# Patient Record
Sex: FEMALE | Race: WHITE | NOT HISPANIC OR LATINO | Employment: OTHER | URBAN - METROPOLITAN AREA
[De-identification: names, ages, dates, MRNs, and addresses within clinical notes are randomized per-mention and may not be internally consistent; named-entity substitution may affect disease eponyms.]

---

## 2023-06-20 LAB
ANION GAP IN SER/PLAS: 14 MMOL/L (ref 10–20)
CALCIUM (MG/DL) IN SER/PLAS: 8.7 MG/DL (ref 8.6–10.3)
CARBON DIOXIDE, TOTAL (MMOL/L) IN SER/PLAS: 25 MMOL/L (ref 21–32)
CHLORIDE (MMOL/L) IN SER/PLAS: 104 MMOL/L (ref 98–107)
CREATININE (MG/DL) IN SER/PLAS: 1.04 MG/DL (ref 0.5–1.05)
GFR FEMALE: 64 ML/MIN/1.73M2
GLUCOSE (MG/DL) IN SER/PLAS: 107 MG/DL (ref 74–99)
POTASSIUM (MMOL/L) IN SER/PLAS: 3.9 MMOL/L (ref 3.5–5.3)
SODIUM (MMOL/L) IN SER/PLAS: 139 MMOL/L (ref 136–145)
UREA NITROGEN (MG/DL) IN SER/PLAS: 19 MG/DL (ref 6–23)

## 2023-10-12 DIAGNOSIS — Z90.11 ACQUIRED ABSENCE OF RIGHT BREAST AND NIPPLE: Primary | ICD-10-CM

## 2023-10-16 PROBLEM — Z90.11 ACQUIRED ABSENCE OF RIGHT BREAST AND NIPPLE: Status: ACTIVE | Noted: 2023-10-12

## 2023-10-17 NOTE — PROGRESS NOTES
Surgery Date is set for 23        Chief Complaint    Acquired absent of breast and NAC  To discuss breast reconstruction with autologous tissue.      History of Present IllnessJulie is s/p Right MASTECTOMY with inferior Manzanares pattern design, and immediate PREPEC TE (SIZE 480-575cc) reconstruction with ADM and adipofascial flap coverage with REED VAC on 3/19/2021.   She returns today to discuss second stage surgery.     Medical Hx: meningioma with observation, and melanoma 27 years ago. HTN  Surgical Hx:  x2, hysterectomy for heavy menses        Review of Systems    Constitutional: no fever and no chills.   Eyes: no loss of vision and no discharge from the eyes.   ENT: no hearing loss, no neck pain and no sore throat.   Neck: no mass(es).   Breast: no nipple discharge, no breast mass, no skin symptoms, no pain in breast, no erythema and no axillary adenopathy.   Cardiovascular: no chest pain, no palpitations and no chest pressure.   Respiratory: no dyspnea at rest, no dyspnea during exertion and no asthma.   Gastrointestinal: no abdominal pain, no constipation, no vomiting, no nausea, no diarrhea and no dysphagia.   Genitourinary: no dysuria, no hematuria and no incontinence.   Musculoskeletal: no arthralgias, no myalgias, no hernia, no back pain and no neck pain.   Skin: no rashes and no skin lesions.   Neurological: no headache, no dizziness and no numbness.   Psychiatric: no anxiety, no depression, no emotional problems and no sleep disturbances.   Endocrine: no heat or cold intolerance and no increased thirst.   Hematologic/Lymphatic: no swollen glands, no tendency for easy bleeding, no tendency for easy bruising and no anemia.     Physical Exam:    Normal heart rate and rhythm, and normal Pulses. Normal respiratory rate, with no wheezing or cough. Normal sensory and motor exam. No extremity edema. Soft, non tender abdomen with no hernias. No lymphadenopathy or swollen glands. Deep tendon reflexes  WNL. Sensory exam is WNL. Normal ROM for her extremities.   Right breast: Half in size of the left breast, constriction of the lower pole was noted. No skin changes were appreciated. Breast is non tender. 0.3 cm mole with clear margins at the inferior pole of right breast is noted (without color changes, overgrowth.   Left breast grade III ptosis. Negative for masses, skin changes, or inverted nipple.     Assessment and plan:    I met with Lorraine and her  in my clinic today to review our planned surgery and address some of their questions related to right breast reconstruction surgery.    CTA was reviewed again: Sizeable, Y-configuration medial row  from left IDEA in addition to medium size lateral row perforators, and medium size medial row . EMI flap based on left side perforators is feasible.    We also discussed balancing procedure: Immediate vs. Delayed/second stage balancing procedures were discussed. Patient would like her right breast augmented and left breast reduced in size, and agreeable to perform revisions at a later stage. Patient is still undetermined about the ideal shape of her right breast (projected vs. Ptotic), and this will need to be revisited before surgery.    Patient has history of melanoma, and she requested that the mole on her right breast (cental inferior pole) be resected and send to pathology, per her Dermatologist.     We concisely reviewed possible risks including infection, hematoma and seroma at the recipient or donor site, flap related complications such as emergency exploration and partial or total failure, and need for a second free flap/pedicle flap, wound healing issues, sensory disturbances at the chest or abdomen, need for additional surgeries, medical complications from GA and/or surgery including but not limited to DVT/PE and up to mortality. Need for blood transfusion and blood thinners and their potential complications were covered today.  Finally, perioperative course and recovery and rehab were addressed thoroughly.     Patient and  demonstrated good understanding to our discussion today, and her questions were answered to the best of my knowledge. And she wanted us to proceed.      Surgery is planned on Dec 13.

## 2023-10-20 PROBLEM — Z17.1 MALIGNANT NEOPLASM OF UPPER-OUTER QUADRANT OF RIGHT BREAST IN FEMALE, ESTROGEN RECEPTOR NEGATIVE (MULTI): Status: ACTIVE | Noted: 2023-10-20

## 2023-10-20 PROBLEM — R92.30 DENSE BREAST TISSUE: Status: ACTIVE | Noted: 2023-10-20

## 2023-10-20 PROBLEM — D05.11 DUCTAL CARCINOMA IN SITU (DCIS) OF RIGHT BREAST WITH COMEDONECROSIS: Status: ACTIVE | Noted: 2023-10-20

## 2023-10-20 PROBLEM — C43.9 MALIGNANT MELANOMA (MULTI): Status: ACTIVE | Noted: 2023-10-20

## 2023-10-20 PROBLEM — N39.3 STRESS INCONTINENCE OF URINE: Status: ACTIVE | Noted: 2023-10-20

## 2023-10-20 PROBLEM — C50.411 MALIGNANT NEOPLASM OF UPPER-OUTER QUADRANT OF RIGHT BREAST IN FEMALE, ESTROGEN RECEPTOR NEGATIVE (MULTI): Status: ACTIVE | Noted: 2023-10-20

## 2023-10-20 PROBLEM — R22.31 MASS OF RIGHT AXILLA: Status: ACTIVE | Noted: 2023-10-20

## 2023-10-20 PROBLEM — N60.99 ATYPICAL DUCTAL HYPERPLASIA OF BREAST: Status: ACTIVE | Noted: 2023-10-20

## 2023-10-20 PROBLEM — D32.9 MENINGIOMA (MULTI): Status: ACTIVE | Noted: 2023-10-20

## 2023-10-20 PROBLEM — I10 HYPERTENSION: Status: ACTIVE | Noted: 2023-10-20

## 2023-10-20 RX ORDER — LISINOPRIL 40 MG/1
40 TABLET ORAL DAILY
COMMUNITY
Start: 2022-02-15 | End: 2023-11-28 | Stop reason: ALTCHOICE

## 2023-10-20 RX ORDER — LISINOPRIL 20 MG/1
20 TABLET ORAL DAILY
COMMUNITY
End: 2023-11-28 | Stop reason: ALTCHOICE

## 2023-10-20 RX ORDER — LISINOPRIL AND HYDROCHLOROTHIAZIDE 12.5; 2 MG/1; MG/1
2 TABLET ORAL DAILY
COMMUNITY
End: 2023-11-03

## 2023-10-23 ENCOUNTER — OFFICE VISIT (OUTPATIENT)
Dept: PLASTIC SURGERY | Facility: CLINIC | Age: 53
End: 2023-10-23
Payer: COMMERCIAL

## 2023-10-23 VITALS
TEMPERATURE: 97.5 F | DIASTOLIC BLOOD PRESSURE: 87 MMHG | HEIGHT: 65 IN | BODY MASS INDEX: 33.82 KG/M2 | HEART RATE: 89 BPM | SYSTOLIC BLOOD PRESSURE: 126 MMHG | WEIGHT: 203 LBS

## 2023-10-23 DIAGNOSIS — Z71.89 ENCOUNTER TO DISCUSS BREAST RECONSTRUCTION: ICD-10-CM

## 2023-10-23 DIAGNOSIS — Z90.11 ACQUIRED ABSENCE OF BREAST AND ABSENT NIPPLE, RIGHT: Primary | ICD-10-CM

## 2023-10-23 PROCEDURE — 3074F SYST BP LT 130 MM HG: CPT

## 2023-10-23 PROCEDURE — 3079F DIAST BP 80-89 MM HG: CPT

## 2023-10-23 PROCEDURE — 99215 OFFICE O/P EST HI 40 MIN: CPT

## 2023-10-23 PROCEDURE — 1036F TOBACCO NON-USER: CPT

## 2023-10-23 ASSESSMENT — PAIN SCALES - GENERAL: PAINLEVEL: 0-NO PAIN

## 2023-11-02 DIAGNOSIS — I10 HYPERTENSION, UNSPECIFIED TYPE: ICD-10-CM

## 2023-11-03 RX ORDER — LISINOPRIL AND HYDROCHLOROTHIAZIDE 12.5; 2 MG/1; MG/1
2 TABLET ORAL DAILY
Qty: 60 TABLET | Refills: 0 | Status: SHIPPED | OUTPATIENT
Start: 2023-11-03 | End: 2023-12-28

## 2023-11-06 ENCOUNTER — PHARMACY VISIT (OUTPATIENT)
Dept: PHARMACY | Facility: CLINIC | Age: 53
End: 2023-11-06
Payer: COMMERCIAL

## 2023-11-06 PROCEDURE — RXMED WILLOW AMBULATORY MEDICATION CHARGE

## 2023-11-15 ENCOUNTER — TELEMEDICINE CLINICAL SUPPORT (OUTPATIENT)
Dept: PREADMISSION TESTING | Facility: HOSPITAL | Age: 53
End: 2023-11-15
Payer: COMMERCIAL

## 2023-11-28 ENCOUNTER — PRE-ADMISSION TESTING (OUTPATIENT)
Dept: PREADMISSION TESTING | Facility: HOSPITAL | Age: 53
End: 2023-11-28
Payer: COMMERCIAL

## 2023-11-28 ENCOUNTER — HOSPITAL ENCOUNTER (OUTPATIENT)
Dept: CARDIOLOGY | Facility: HOSPITAL | Age: 53
Discharge: HOME | End: 2023-11-28
Payer: COMMERCIAL

## 2023-11-28 VITALS
DIASTOLIC BLOOD PRESSURE: 82 MMHG | TEMPERATURE: 97.5 F | HEART RATE: 74 BPM | WEIGHT: 205.69 LBS | BODY MASS INDEX: 34.27 KG/M2 | SYSTOLIC BLOOD PRESSURE: 119 MMHG | OXYGEN SATURATION: 99 % | HEIGHT: 65 IN

## 2023-11-28 DIAGNOSIS — I10 HYPERTENSION, UNSPECIFIED TYPE: ICD-10-CM

## 2023-11-28 DIAGNOSIS — Z01.818 PREOP TESTING: ICD-10-CM

## 2023-11-28 DIAGNOSIS — Z01.818 PREOP TESTING: Primary | ICD-10-CM

## 2023-11-28 DIAGNOSIS — Z90.11 ACQUIRED ABSENCE OF RIGHT BREAST AND NIPPLE: ICD-10-CM

## 2023-11-28 LAB
ABO GROUP (TYPE) IN BLOOD: NORMAL
ANION GAP SERPL CALC-SCNC: 16 MMOL/L (ref 10–20)
ANTIBODY SCREEN: NORMAL
BUN SERPL-MCNC: 16 MG/DL (ref 6–23)
CALCIUM SERPL-MCNC: 9.6 MG/DL (ref 8.6–10.6)
CHLORIDE SERPL-SCNC: 99 MMOL/L (ref 98–107)
CO2 SERPL-SCNC: 26 MMOL/L (ref 21–32)
CREAT SERPL-MCNC: 0.99 MG/DL (ref 0.5–1.05)
ERYTHROCYTE [DISTWIDTH] IN BLOOD BY AUTOMATED COUNT: 12.5 % (ref 11.5–14.5)
GFR SERPL CREATININE-BSD FRML MDRD: 68 ML/MIN/1.73M*2
GLUCOSE SERPL-MCNC: 84 MG/DL (ref 74–99)
HCT VFR BLD AUTO: 38.3 % (ref 36–46)
HGB BLD-MCNC: 12.2 G/DL (ref 12–16)
MCH RBC QN AUTO: 29.9 PG (ref 26–34)
MCHC RBC AUTO-ENTMCNC: 31.9 G/DL (ref 32–36)
MCV RBC AUTO: 94 FL (ref 80–100)
NRBC BLD-RTO: 0 /100 WBCS (ref 0–0)
PLATELET # BLD AUTO: 272 X10*3/UL (ref 150–450)
POTASSIUM SERPL-SCNC: 3.9 MMOL/L (ref 3.5–5.3)
RBC # BLD AUTO: 4.08 X10*6/UL (ref 4–5.2)
RH FACTOR (ANTIGEN D): NORMAL
SODIUM SERPL-SCNC: 137 MMOL/L (ref 136–145)
WBC # BLD AUTO: 8.5 X10*3/UL (ref 4.4–11.3)

## 2023-11-28 PROCEDURE — 80048 BASIC METABOLIC PNL TOTAL CA: CPT

## 2023-11-28 PROCEDURE — 86901 BLOOD TYPING SEROLOGIC RH(D): CPT

## 2023-11-28 PROCEDURE — 93005 ELECTROCARDIOGRAM TRACING: CPT

## 2023-11-28 PROCEDURE — 85027 COMPLETE CBC AUTOMATED: CPT

## 2023-11-28 PROCEDURE — 99204 OFFICE O/P NEW MOD 45 MIN: CPT | Performed by: NURSE PRACTITIONER

## 2023-11-28 PROCEDURE — 93010 ELECTROCARDIOGRAM REPORT: CPT | Performed by: INTERNAL MEDICINE

## 2023-11-28 PROCEDURE — 87081 CULTURE SCREEN ONLY: CPT

## 2023-11-28 PROCEDURE — 36415 COLL VENOUS BLD VENIPUNCTURE: CPT

## 2023-11-28 RX ORDER — CHLORHEXIDINE GLUCONATE ORAL RINSE 1.2 MG/ML
15 SOLUTION DENTAL 2 TIMES DAILY
Qty: 473 ML | Refills: 0 | Status: SHIPPED | OUTPATIENT
Start: 2023-11-28 | End: 2023-12-18 | Stop reason: HOSPADM

## 2023-11-28 RX ORDER — CHLORHEXIDINE GLUCONATE 40 MG/ML
SOLUTION TOPICAL 2 TIMES DAILY
Qty: 473 ML | Refills: 0 | Status: SHIPPED | OUTPATIENT
Start: 2023-11-28 | End: 2023-12-03

## 2023-11-28 ASSESSMENT — CHADS2 SCORE
CHF: NO
PRIOR STROKE OR TIA OR THROMBOEMBOLISM: NO
DIABETES: NO
AGE GREATER THAN OR EQUAL TO 75: NO
AGE GREATER THAN OR EQUAL TO 75: NO
HYPERTENSION: YES
CHADS2 SCORE: 1

## 2023-11-28 ASSESSMENT — DUKE ACTIVITY SCORE INDEX (DASI)
CAN YOU PARTICIPATE IN MODERATE RECREATIONAL ACTIVITIES LIKE GOLF, BOWLING, DANCING, DOUBLES TENNIS OR THROWING A BASEBALL OR FOOTBALL: YES
CAN YOU WALK INDOORS, SUCH AS AROUND YOUR HOUSE: YES
CAN YOU WALK A BLOCK OR TWO ON LEVEL GROUND: YES
CAN YOU HAVE SEXUAL RELATIONS: YES
CAN YOU RUN A SHORT DISTANCE: YES
CAN YOU TAKE CARE OF YOURSELF (EAT, DRESS, BATHE, OR USE TOILET): YES
CAN YOU DO MODERATE WORK AROUND THE HOUSE LIKE VACUUMING, SWEEPING FLOORS OR CARRYING GROCERIES: YES
CAN YOU DO HEAVY WORK AROUND THE HOUSE LIKE SCRUBBING FLOORS OR LIFTING AND MOVING HEAVY FURNITURE: YES
CAN YOU DO LIGHT WORK AROUND THE HOUSE LIKE DUSTING OR WASHING DISHES: YES
CAN YOU PARTICIPATE IN STRENOUS SPORTS LIKE SWIMMING, SINGLES TENNIS, FOOTBALL, BASKETBALL, OR SKIING: YES
TOTAL_SCORE: 58.2
CAN YOU DO YARD WORK LIKE RAKING LEAVES, WEEDING OR PUSHING A MOWER: YES
DASI METS SCORE: 9.9
CAN YOU CLIMB A FLIGHT OF STAIRS OR WALK UP A HILL: YES

## 2023-11-28 ASSESSMENT — ENCOUNTER SYMPTOMS
CARDIOVASCULAR NEGATIVE: 1
FEVER: 0
ENDOCRINE NEGATIVE: 1
CONSTITUTIONAL NEGATIVE: 1
EYES NEGATIVE: 1
NEUROLOGICAL NEGATIVE: 1
RESPIRATORY NEGATIVE: 1
GASTROINTESTINAL NEGATIVE: 1
CHILLS: 0
MUSCULOSKELETAL NEGATIVE: 1
NECK NEGATIVE: 1

## 2023-11-28 ASSESSMENT — LIFESTYLE VARIABLES: SMOKING_STATUS: NONSMOKER

## 2023-11-28 NOTE — PREPROCEDURE INSTRUCTIONS
NPO Instructions:    Do not eat any food after midnight the night before your surgery/procedure.  You may have 10 ounces clear liquids until TWO hours before surgery/procedure. This includes water, black tea/coffee, (no milk or cream) apple juice and electrolyte drinks (Gatorade).  You may chew gum up to TWO hours before your surgery/procedure.    Additional Instructions:     Seven/Six Days before Surgery:  We have sent a prescription for Hibiclens soap to your preferred pharmacy.  If you have not already, Please  your prescription and start using five days before surgery.  Follow the instruction sheet provided to you at your CPM/PAT appointment.  Review your medication instructions, stop indicated medications    Five Days before Surgery:  Review your medication instructions, stop indicated medications  Begin using your Hibiclens    Three Days before Surgery:  Review your medication instructions, stop indicated medications    The Day before Surgery:  Review your medication instructions, stop indicated medications  You will be contacted regarding the time of your arrival to facility and surgery time  Do not eat any food after Midnight    Day of Surgery:  Review your medication instructions, take indicated medications  You may have clear liquids until TWO hours before surgery/procedure.  This includes water, black tea/coffee, (no milk or cream) apple juice and electrolyte drinks (Gatorade)  You may chew gum up to TWO hours before your surgery/procedure  Wear  comfortable loose fitting clothing  Do not use moisturizers, creams, lotions or perfume  All jewelry and valuables should be left at home    Avoid herbal supplements, multivitamins and NSAIDS (non-steroidal anti-inflammatory drugs) such as Advil, Aleve, Ibuprofen, Naproxen, Excedrin, Meloxicam or Celebrex for at least 7 days prior to surgery. May take Tylenol as needed.    Please follow up with Dr. Omar Mendoza, neurosurgery as soon as possible.      Ally Parnell, MSN, NP-C, CNP  Family Nurse Practitioner  Department of Anesthesiology and Perioperative Medicine  Main phone: [phone]  Fax :[phone]  Direct: [phone]

## 2023-11-28 NOTE — CPM/PAT H&P
CPM/PAT Evaluation       Name: Lorraine Liu (Lorraine Liu)  /Age: 1970/53 y.o.     Visit Type:   In-Person       Chief Complaint: Patient is a 53 year old female scheduled for Release Contracture Torso - Right  Removal Tissue Expander - Right Capsulotomy Breast - Right Reconstruction Breast with Deep Inferior Epigastric  Flap - Right with Dr. Leigh Watkins and Dr. Bay Albrecht on 23    HPI   Patient is a 53 year old female scheduled for Release Contracture Torso - Right  Removal Tissue Expander - Right Capsulotomy Breast - Right Reconstruction Breast with Deep Inferior Epigastric  Flap - Right with Dr. Leigh Watkins and Dr. 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 Patient referred by Dr. Watkins for preop evaluation of hypertension, breast cancer, melanoma and meningioma.    Past Medical History:   Diagnosis Date    Chest pain     Saw Dr. Mclaughlin in 2022    Ductal carcinoma of breast (CMS/HCC) 2021    Hypertension     F/W PCP    Melanoma (CMS/HCC)     Meningioma (CMS/HCC)     Shortness of breath     Ct Calcium Scoring 3/18/2022: IMPRESSION: Coronary artery calcium score of  0.    Stress incontinence        Past Surgical History:   Procedure Laterality Date    BI BREAST CYST ASPIRATION LEFT Left 2016    BI BREAST CYST ASPIRATION LEFT [facility] ANCILLARY LEGACY    BI MAMMO GUIDED LOCALIZATION BREAST RIGHT Right 02/10/2021    BI MAMMO GUIDED LOCALIZATION BREAST RIGHT 2/10/2021 [facility] CLINICAL LEGACY    BI STEREOSTATIC GUIDED BREAST RIGHT LOCALIZATION AND BIOPSY Right 2021    BI STEREOTACTIC GUIDED BREAST LOCALIZATION AND BIOPSY RIGHT [facility] CLINICAL LEGACY    BI US GUIDED BREAST LOCALIZATION AND BIOPSY RIGHT Right 2021    BI US GUIDED BREAST LOCALIZATION AND BIOPSY RIGHT [facility] CLINICAL LEGACY    BREAST LUMPECTOMY Right 02/10/2021     SECTION, LOW TRANSVERSE      x2    CT ABDOMEN PELVIS ANGIOGRAM W AND/OR WO IV CONTRAST  2023     CT ABDOMEN PELVIS ANGIOGRAM W AND/OR WO IV CONTRAST 6/22/2023 [id number] CT    HYSTERECTOMY      d/t heavy menses    MASTECTOMY Right     OTHER SURGICAL HISTORY  03/25/2021    Breast reconstruction    OTHER SURGICAL HISTORY  1996    Melanoma Excision       Family History   Problem Relation Name Age of Onset    Diabetes Mother      Melanoma Father      Thyroid cancer Father      Melanoma Brother      Leukemia Brother      Other (brain tumor) Brother         No Known Allergies    Prior to Admission medications    Medication Sig Start Date End Date Taking? Authorizing Provider   lisinopriL-hydrochlorothiazide 20-12.5 mg tablet Take 2 tablets by mouth once daily 11/3/23  Yes Lyle Herbert, DO   tamoxifen (Nolvadex) 20 mg tablet TAKE ONE (1) TABLET BY MOUTH ONCE A DAY 6/1/23 5/31/24 Yes Blayne Franco, APRN-CNP   lisinopril 20 mg tablet Take 1 tablet (20 mg) by mouth once daily.  11/28/23  Historical Provider, MD   lisinopril 40 mg tablet Take 1 tablet (40 mg) by mouth once daily. For 90 days 2/15/22 11/28/23  Historical Provider, MD        PAT ROS:   Constitutional:   neg     no fever   no chills  Neuro/Psych:   neg    Eyes:   neg    Ears:   neg    Nose:   neg    Mouth:   neg    Throat:   neg    Neck:   neg    Cardio:   neg    Respiratory:   neg    Endocrine:   neg    GI:   neg    :    Stress incontinence  Musculoskeletal:   neg    Hematologic:   neg    Skin:  neg        Physical Exam  Vitals reviewed.   Constitutional:       Appearance: Normal appearance.   HENT:      Head: Normocephalic and atraumatic.      Nose: Nose normal.      Mouth/Throat:      Mouth: Mucous membranes are moist.      Pharynx: Oropharynx is clear.   Eyes:      Extraocular Movements: Extraocular movements intact.      Pupils: Pupils are equal, round, and reactive to light.   Cardiovascular:      Rate and Rhythm: Normal rate and regular rhythm.      Heart sounds: Normal heart sounds.   Pulmonary:      Effort: Pulmonary effort is normal.       Breath sounds: Normal breath sounds.   Abdominal:      General: Abdomen is flat. Bowel sounds are normal.      Palpations: Abdomen is soft.   Musculoskeletal:         General: Normal range of motion.      Cervical back: Normal range of motion and neck supple.   Skin:     General: Skin is warm and dry.   Neurological:      Mental Status: She is alert and oriented to person, place, and time.   Psychiatric:         Mood and Affect: Mood normal.         Behavior: Behavior normal.         Thought Content: Thought content normal.         Judgment: Judgment normal.          PAT AIRWAY:   Airway:     Mallampati::  II    Neck ROM::  Full   States nothing removable      Visit Vitals  /82   Pulse 74   Temp 36.4 °C (97.5 °F) (Temporal)       DASI Risk Score      Flowsheet Row Most Recent Value   DASI SCORE 58.2   METS Score (Will be calculated only when all the questions are answered) 9.9          Caprini DVT Assessment      Flowsheet Row Most Recent Value   DVT Score 10   Current Status Major surgery planned, lasting over 3 hours   History Prior major surgery   Age 40-59 years   BMI 31-40 (Obesity)          Modified Frailty Index      Flowsheet Row Most Recent Value   Modified Frailty Index Calculator .0909          CHADS2 Stroke Risk  Current as of 3 hours ago        N/A 3 - 100%: High Risk   2 - 3%: Medium Risk   0 - 2%: Low Risk     Last Change: N/A          This score determines the patient's risk of having a stroke if the patient has atrial fibrillation.        This score is not applicable to this patient. Components are not calculated.          Revised Cardiac Risk Index      Flowsheet Row Most Recent Value   Revised Cardiac Risk Calculator 1          Apfel Simplified Score      Flowsheet Row Most Recent Value   Apfel Simplified Score Calculator 4          Risk Analysis Index Results This Encounter    No data found in the last 1 encounters.       Stop Bang Score      Flowsheet Row Most Recent Value   Do you  snore loudly? 0   Do you often feel tired or fatigued after your sleep? 0   Has anyone ever observed you stop breathing in your sleep? 0   Do you have or are you being treated for high blood pressure? 1   Recent BMI (Calculated) 33.8   Is BMI greater than 35 kg/m2? 0=No   Age older than 50 years old? 1=Yes   Is your neck circumference greater than 17 inches (Male) or 16 inches (Female)? 0   Gender - Male 0=No   STOP-BANG Total Score 2          Assessment and Plan:     Neuro:   No diagnosis or significant findings on chart review or clinical presentation and evaluation.    Meningioma  Patient has a history of meningioma and has followed with Dr. Omar Mendoza, neurosurgery.  States she was due to have a scan in June but has not yet had it completed.  Last visit found with Dr. Mendoza was in 2020.     No neurologic diagnoses, however, the patient is at an increased risk for post operative delirium secondary to  type and duration of surgery    The patient is at an increased risk for perioperative stroke secondary to HTN,  general anesthesia,  op time >2.5 hours    Patient given information on brain exercises and delirium prevention.    HEENT/Airway  No diagnosis or significant findings on chart review or clinical presentation and evaluation.    Cardiovascular  No diagnosis or significant findings on chart review or clinical presentation and evaluation.    Hypertension   Currently controlled with lisinopril-hydrochlorothiazide. BP today 119/82     RCRI  The patient meets 0-1 RCRI criteria and therefore has a less than 1% risk of major adverse cardiac complications.  METS  The patient's functional capacity capacity is less than 4 METS.  MACE  30-day risk for MACE of 1 predictor, 6.0% risk for cardiac death, nonfatal myocardial infarction, and nonfatal cardiac arrest  JENNY  0.0% for <25th percentile  EKG  11-28-23: NSR    Pulmonary   No diagnosis or significant findings on chart review or clinical presentation and  evaluation.    STOP BANG Score of 2, which places patient at intermediate risk for having NEAL.  ARISCAT 50, High, 42.1% risk of in-hospital postoperative pulmonary complications  PRODIGY 3, low of respiratory depression episode.    Patient given information on deep breathing exercises.     Renal  No diagnosis or significant findings on chart review or clinical presentation and evaluation.  11-28-23: BMP: Creatinine 0.99    Endocrine  No diagnosis or significant findings on chart review or clinical presentation and evaluation.    Hematology  No diagnosis or significant findings on chart review or clinical presentation and evaluation.    History of Breast Cancer and Melanoma  Currently taking tamoxifen (Nolvadex)  Patient follows with Blayne WEINSTEIN, oncology.  Last visit 6-1-23    Caprini score 10, high risk of VTE  Transfusion Evaluation  A type and screen was obtained given the likelihood for perioperative transfusion of blood or blood products.    Patient given information on DVT prevention.     GI  No diagnosis or significant findings on chart review or clinical presentation and evaluation.  Eat 10- 0  Apfel: 4 points 79% risk for post operative nausea/vomiting.     Genitourinary    Stress Incontinence  Patient with stress incontinence. States not currently taking any medications or treatments    ID  No diagnosis or significant findings on chart review or clinical presentation and evaluation.    MRSA swab ordered  Chlorhexidine mouth wash and body wash ordered    Musculoskeletal  No diagnosis or significant findings on chart review or clinical presentation and evaluation.      -Preoperative medication instructions were provided and reviewed with the patient.  Any additional testing or evaluation was explained to the patient.  NPO Instructions were discussed, and the patient's questions were answered prior to conclusion of this encounter    Labs ordered:    Recent Results (from the past 168 hour(s))    Basic Metabolic Panel    Collection Time: 11/28/23 12:47 PM   Result Value Ref Range    Glucose 84 74 - 99 mg/dL    Sodium 137 136 - 145 mmol/L    Potassium 3.9 3.5 - 5.3 mmol/L    Chloride 99 98 - 107 mmol/L    Bicarbonate 26 21 - 32 mmol/L    Anion Gap 16 10 - 20 mmol/L    Urea Nitrogen 16 6 - 23 mg/dL    Creatinine 0.99 0.50 - 1.05 mg/dL    eGFR 68 >60 mL/min/1.73m*2    Calcium 9.6 8.6 - 10.6 mg/dL   CBC    Collection Time: 11/28/23 12:47 PM   Result Value Ref Range    WBC 8.5 4.4 - 11.3 x10*3/uL    nRBC 0.0 0.0 - 0.0 /100 WBCs    RBC 4.08 4.00 - 5.20 x10*6/uL    Hemoglobin 12.2 12.0 - 16.0 g/dL    Hematocrit 38.3 36.0 - 46.0 %    MCV 94 80 - 100 fL    MCH 29.9 26.0 - 34.0 pg    MCHC 31.9 (L) 32.0 - 36.0 g/dL    RDW 12.5 11.5 - 14.5 %    Platelets 272 150 - 450 x10*3/uL   Type And Screen    Collection Time: 11/28/23 12:47 PM   Result Value Ref Range    ABO TYPE B     Rh TYPE POS     ANTIBODY SCREEN NEG    Staphylococcus aureus/MRSA colonization, Culture    Collection Time: 11/28/23 12:47 PM    Specimen: Nares/Axilla/Groin; Swab   Result Value Ref Range    Staph/MRSA Screen Culture No Staphylococcus aureus isolated    ECG 12 Lead    Collection Time: 11/29/23  9:01 AM   Result Value Ref Range    Ventricular Rate 71 BPM    Atrial Rate 71 BPM    MS Interval 148 ms    QRS Duration 82 ms    QT Interval 414 ms    QTC Calculation(Bazett) 449 ms    P Axis 6 degrees    R Axis 41 degrees    T Axis 3 degrees    QRS Count 12 beats    Q Onset 225 ms    P Onset 151 ms    P Offset 198 ms    T Offset 432 ms    QTC Fredericia 438 ms

## 2023-11-28 NOTE — H&P (VIEW-ONLY)
CPM/PAT Evaluation       Name: Lorraine Liu (Lorraine Liu)  /Age: 1970/53 y.o.     Visit Type:   In-Person       Chief Complaint: Patient is a 53 year old female scheduled for Release Contracture Torso - Right  Removal Tissue Expander - Right Capsulotomy Breast - Right Reconstruction Breast with Deep Inferior Epigastric  Flap - Right with Dr. Leigh Watkins and Dr. Bay Albrecht on 23    HPI   Patient is a 53 year old female scheduled for Release Contracture Torso - Right  Removal Tissue Expander - Right Capsulotomy Breast - Right Reconstruction Breast with Deep Inferior Epigastric  Flap - Right with Dr. Leigh Watkins and Dr. 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 Patient referred by Dr. Watkins for preop evaluation of hypertension, breast cancer, melanoma and meningioma.    Past Medical History:   Diagnosis Date    Chest pain     Saw Dr. Mclaughlin in 2022    Ductal carcinoma of breast (CMS/HCC) 2021    Hypertension     F/W PCP    Melanoma (CMS/HCC)     Meningioma (CMS/HCC)     Shortness of breath     Ct Calcium Scoring 3/18/2022: IMPRESSION: Coronary artery calcium score of  0.    Stress incontinence        Past Surgical History:   Procedure Laterality Date    BI BREAST CYST ASPIRATION LEFT Left 2016    BI BREAST CYST ASPIRATION LEFT [facility] ANCILLARY LEGACY    BI MAMMO GUIDED LOCALIZATION BREAST RIGHT Right 02/10/2021    BI MAMMO GUIDED LOCALIZATION BREAST RIGHT 2/10/2021 [facility] CLINICAL LEGACY    BI STEREOSTATIC GUIDED BREAST RIGHT LOCALIZATION AND BIOPSY Right 2021    BI STEREOTACTIC GUIDED BREAST LOCALIZATION AND BIOPSY RIGHT [facility] CLINICAL LEGACY    BI US GUIDED BREAST LOCALIZATION AND BIOPSY RIGHT Right 2021    BI US GUIDED BREAST LOCALIZATION AND BIOPSY RIGHT [facility] CLINICAL LEGACY    BREAST LUMPECTOMY Right 02/10/2021     SECTION, LOW TRANSVERSE      x2    CT ABDOMEN PELVIS ANGIOGRAM W AND/OR WO IV CONTRAST  2023     CT ABDOMEN PELVIS ANGIOGRAM W AND/OR WO IV CONTRAST 6/22/2023 [id number] CT    HYSTERECTOMY      d/t heavy menses    MASTECTOMY Right     OTHER SURGICAL HISTORY  03/25/2021    Breast reconstruction    OTHER SURGICAL HISTORY  1996    Melanoma Excision       Family History   Problem Relation Name Age of Onset    Diabetes Mother      Melanoma Father      Thyroid cancer Father      Melanoma Brother      Leukemia Brother      Other (brain tumor) Brother         No Known Allergies    Prior to Admission medications    Medication Sig Start Date End Date Taking? Authorizing Provider   lisinopriL-hydrochlorothiazide 20-12.5 mg tablet Take 2 tablets by mouth once daily 11/3/23  Yes Lyle Herbert, DO   tamoxifen (Nolvadex) 20 mg tablet TAKE ONE (1) TABLET BY MOUTH ONCE A DAY 6/1/23 5/31/24 Yes Blayne Franco, APRN-CNP   lisinopril 20 mg tablet Take 1 tablet (20 mg) by mouth once daily.  11/28/23  Historical Provider, MD   lisinopril 40 mg tablet Take 1 tablet (40 mg) by mouth once daily. For 90 days 2/15/22 11/28/23  Historical Provider, MD        PAT ROS:   Constitutional:   neg     no fever   no chills  Neuro/Psych:   neg    Eyes:   neg    Ears:   neg    Nose:   neg    Mouth:   neg    Throat:   neg    Neck:   neg    Cardio:   neg    Respiratory:   neg    Endocrine:   neg    GI:   neg    :    Stress incontinence  Musculoskeletal:   neg    Hematologic:   neg    Skin:  neg        Physical Exam  Vitals reviewed.   Constitutional:       Appearance: Normal appearance.   HENT:      Head: Normocephalic and atraumatic.      Nose: Nose normal.      Mouth/Throat:      Mouth: Mucous membranes are moist.      Pharynx: Oropharynx is clear.   Eyes:      Extraocular Movements: Extraocular movements intact.      Pupils: Pupils are equal, round, and reactive to light.   Cardiovascular:      Rate and Rhythm: Normal rate and regular rhythm.      Heart sounds: Normal heart sounds.   Pulmonary:      Effort: Pulmonary effort is normal.       Breath sounds: Normal breath sounds.   Abdominal:      General: Abdomen is flat. Bowel sounds are normal.      Palpations: Abdomen is soft.   Musculoskeletal:         General: Normal range of motion.      Cervical back: Normal range of motion and neck supple.   Skin:     General: Skin is warm and dry.   Neurological:      Mental Status: She is alert and oriented to person, place, and time.   Psychiatric:         Mood and Affect: Mood normal.         Behavior: Behavior normal.         Thought Content: Thought content normal.         Judgment: Judgment normal.          PAT AIRWAY:   Airway:     Mallampati::  II    Neck ROM::  Full   States nothing removable      Visit Vitals  /82   Pulse 74   Temp 36.4 °C (97.5 °F) (Temporal)       DASI Risk Score      Flowsheet Row Most Recent Value   DASI SCORE 58.2   METS Score (Will be calculated only when all the questions are answered) 9.9          Caprini DVT Assessment      Flowsheet Row Most Recent Value   DVT Score 10   Current Status Major surgery planned, lasting over 3 hours   History Prior major surgery   Age 40-59 years   BMI 31-40 (Obesity)          Modified Frailty Index      Flowsheet Row Most Recent Value   Modified Frailty Index Calculator .0909          CHADS2 Stroke Risk  Current as of 3 hours ago        N/A 3 - 100%: High Risk   2 - 3%: Medium Risk   0 - 2%: Low Risk     Last Change: N/A          This score determines the patient's risk of having a stroke if the patient has atrial fibrillation.        This score is not applicable to this patient. Components are not calculated.          Revised Cardiac Risk Index      Flowsheet Row Most Recent Value   Revised Cardiac Risk Calculator 1          Apfel Simplified Score      Flowsheet Row Most Recent Value   Apfel Simplified Score Calculator 4          Risk Analysis Index Results This Encounter    No data found in the last 1 encounters.       Stop Bang Score      Flowsheet Row Most Recent Value   Do you  snore loudly? 0   Do you often feel tired or fatigued after your sleep? 0   Has anyone ever observed you stop breathing in your sleep? 0   Do you have or are you being treated for high blood pressure? 1   Recent BMI (Calculated) 33.8   Is BMI greater than 35 kg/m2? 0=No   Age older than 50 years old? 1=Yes   Is your neck circumference greater than 17 inches (Male) or 16 inches (Female)? 0   Gender - Male 0=No   STOP-BANG Total Score 2          Assessment and Plan:     Neuro:   No diagnosis or significant findings on chart review or clinical presentation and evaluation.    Meningioma  Patient has a history of meningioma and has followed with Dr. Omar Mendoza, neurosurgery.  States she was due to have a scan in June but has not yet had it completed.  Last visit found with Dr. Mendoza was in 2020.     No neurologic diagnoses, however, the patient is at an increased risk for post operative delirium secondary to  type and duration of surgery    The patient is at an increased risk for perioperative stroke secondary to HTN,  general anesthesia,  op time >2.5 hours    Patient given information on brain exercises and delirium prevention.    HEENT/Airway  No diagnosis or significant findings on chart review or clinical presentation and evaluation.    Cardiovascular  No diagnosis or significant findings on chart review or clinical presentation and evaluation.    Hypertension   Currently controlled with lisinopril-hydrochlorothiazide. BP today 119/82     RCRI  The patient meets 0-1 RCRI criteria and therefore has a less than 1% risk of major adverse cardiac complications.  METS  The patient's functional capacity capacity is less than 4 METS.  MACE  30-day risk for MACE of 1 predictor, 6.0% risk for cardiac death, nonfatal myocardial infarction, and nonfatal cardiac arrest  JENNY  0.0% for <25th percentile  EKG  11-28-23: NSR    Pulmonary   No diagnosis or significant findings on chart review or clinical presentation and  evaluation.    STOP BANG Score of 2, which places patient at intermediate risk for having NEAL.  ARISCAT 50, High, 42.1% risk of in-hospital postoperative pulmonary complications  PRODIGY 3, low of respiratory depression episode.    Patient given information on deep breathing exercises.     Renal  No diagnosis or significant findings on chart review or clinical presentation and evaluation.  11-28-23: BMP: Creatinine 0.99    Endocrine  No diagnosis or significant findings on chart review or clinical presentation and evaluation.    Hematology  No diagnosis or significant findings on chart review or clinical presentation and evaluation.    History of Breast Cancer and Melanoma  Currently taking tamoxifen (Nolvadex)  Patient follows with Blayne WEINSTEIN, oncology.  Last visit 6-1-23    Caprini score 10, high risk of VTE  Transfusion Evaluation  A type and screen was obtained given the likelihood for perioperative transfusion of blood or blood products.    Patient given information on DVT prevention.     GI  No diagnosis or significant findings on chart review or clinical presentation and evaluation.  Eat 10- 0  Apfel: 4 points 79% risk for post operative nausea/vomiting.     Genitourinary    Stress Incontinence  Patient with stress incontinence. States not currently taking any medications or treatments    ID  No diagnosis or significant findings on chart review or clinical presentation and evaluation.    MRSA swab ordered  Chlorhexidine mouth wash and body wash ordered    Musculoskeletal  No diagnosis or significant findings on chart review or clinical presentation and evaluation.      -Preoperative medication instructions were provided and reviewed with the patient.  Any additional testing or evaluation was explained to the patient.  NPO Instructions were discussed, and the patient's questions were answered prior to conclusion of this encounter    Labs ordered:    Recent Results (from the past 168 hour(s))    Basic Metabolic Panel    Collection Time: 11/28/23 12:47 PM   Result Value Ref Range    Glucose 84 74 - 99 mg/dL    Sodium 137 136 - 145 mmol/L    Potassium 3.9 3.5 - 5.3 mmol/L    Chloride 99 98 - 107 mmol/L    Bicarbonate 26 21 - 32 mmol/L    Anion Gap 16 10 - 20 mmol/L    Urea Nitrogen 16 6 - 23 mg/dL    Creatinine 0.99 0.50 - 1.05 mg/dL    eGFR 68 >60 mL/min/1.73m*2    Calcium 9.6 8.6 - 10.6 mg/dL   CBC    Collection Time: 11/28/23 12:47 PM   Result Value Ref Range    WBC 8.5 4.4 - 11.3 x10*3/uL    nRBC 0.0 0.0 - 0.0 /100 WBCs    RBC 4.08 4.00 - 5.20 x10*6/uL    Hemoglobin 12.2 12.0 - 16.0 g/dL    Hematocrit 38.3 36.0 - 46.0 %    MCV 94 80 - 100 fL    MCH 29.9 26.0 - 34.0 pg    MCHC 31.9 (L) 32.0 - 36.0 g/dL    RDW 12.5 11.5 - 14.5 %    Platelets 272 150 - 450 x10*3/uL   Type And Screen    Collection Time: 11/28/23 12:47 PM   Result Value Ref Range    ABO TYPE B     Rh TYPE POS     ANTIBODY SCREEN NEG    Staphylococcus aureus/MRSA colonization, Culture    Collection Time: 11/28/23 12:47 PM    Specimen: Nares/Axilla/Groin; Swab   Result Value Ref Range    Staph/MRSA Screen Culture No Staphylococcus aureus isolated    ECG 12 Lead    Collection Time: 11/29/23  9:01 AM   Result Value Ref Range    Ventricular Rate 71 BPM    Atrial Rate 71 BPM    CA Interval 148 ms    QRS Duration 82 ms    QT Interval 414 ms    QTC Calculation(Bazett) 449 ms    P Axis 6 degrees    R Axis 41 degrees    T Axis 3 degrees    QRS Count 12 beats    Q Onset 225 ms    P Onset 151 ms    P Offset 198 ms    T Offset 432 ms    QTC Fredericia 438 ms

## 2023-11-29 LAB
ATRIAL RATE: 71 BPM
P AXIS: 6 DEGREES
P OFFSET: 198 MS
P ONSET: 151 MS
PR INTERVAL: 148 MS
Q ONSET: 225 MS
QRS COUNT: 12 BEATS
QRS DURATION: 82 MS
QT INTERVAL: 414 MS
QTC CALCULATION(BAZETT): 449 MS
QTC FREDERICIA: 438 MS
R AXIS: 41 DEGREES
T AXIS: 3 DEGREES
T OFFSET: 432 MS
VENTRICULAR RATE: 71 BPM

## 2023-11-30 LAB — STAPHYLOCOCCUS SPEC CULT: NORMAL

## 2023-12-06 ENCOUNTER — TELEPHONE (OUTPATIENT)
Dept: OCCUPATIONAL MEDICINE | Facility: HOSPITAL | Age: 53
End: 2023-12-06
Payer: COMMERCIAL

## 2023-12-06 NOTE — TELEPHONE ENCOUNTER
Called and spoke with patient and informed her that her surgery will have to be moved to Tuesday 12/12.  Patient understood and agreed to the change.

## 2023-12-11 ENCOUNTER — ANESTHESIA EVENT (OUTPATIENT)
Dept: OPERATING ROOM | Facility: HOSPITAL | Age: 53
DRG: 585 | End: 2023-12-11
Payer: COMMERCIAL

## 2023-12-11 PROBLEM — Z98.890 HISTORY OF GENERAL ANESTHESIA: Status: ACTIVE | Noted: 2023-12-11

## 2023-12-11 PROBLEM — Z98.890 STATUS POST BREAST RECONSTRUCTION: Status: ACTIVE | Noted: 2023-12-11

## 2023-12-11 PROBLEM — Z87.898 HISTORY OF ANESTHESIA COMPLICATIONS: Status: ACTIVE | Noted: 2023-12-11

## 2023-12-11 PROBLEM — R11.2 PONV (POSTOPERATIVE NAUSEA AND VOMITING): Status: ACTIVE | Noted: 2023-12-11

## 2023-12-11 PROBLEM — Z98.890 PONV (POSTOPERATIVE NAUSEA AND VOMITING): Status: ACTIVE | Noted: 2023-12-11

## 2023-12-12 ENCOUNTER — HOSPITAL ENCOUNTER (INPATIENT)
Facility: HOSPITAL | Age: 53
LOS: 6 days | Discharge: HOME | DRG: 585 | End: 2023-12-18
Payer: COMMERCIAL

## 2023-12-12 ENCOUNTER — ANESTHESIA (OUTPATIENT)
Dept: OPERATING ROOM | Facility: HOSPITAL | Age: 53
DRG: 585 | End: 2023-12-12
Payer: COMMERCIAL

## 2023-12-12 DIAGNOSIS — Z90.11 ACQUIRED ABSENCE OF RIGHT BREAST AND NIPPLE: ICD-10-CM

## 2023-12-12 DIAGNOSIS — Z98.890 STATUS POST BREAST RECONSTRUCTION: Primary | ICD-10-CM

## 2023-12-12 DIAGNOSIS — M79.89 OTHER SPECIFIED SOFT TISSUE DISORDERS: ICD-10-CM

## 2023-12-12 LAB
ALBUMIN SERPL BCP-MCNC: 3.7 G/DL (ref 3.4–5)
ANION GAP SERPL CALC-SCNC: 18 MMOL/L (ref 10–20)
BUN SERPL-MCNC: 21 MG/DL (ref 6–23)
CALCIUM SERPL-MCNC: 8.2 MG/DL (ref 8.6–10.6)
CHLORIDE SERPL-SCNC: 108 MMOL/L (ref 98–107)
CO2 SERPL-SCNC: 19 MMOL/L (ref 21–32)
CREAT SERPL-MCNC: 0.97 MG/DL (ref 0.5–1.05)
ERYTHROCYTE [DISTWIDTH] IN BLOOD BY AUTOMATED COUNT: 12.2 % (ref 11.5–14.5)
GFR SERPL CREATININE-BSD FRML MDRD: 70 ML/MIN/1.73M*2
GLUCOSE SERPL-MCNC: 184 MG/DL (ref 74–99)
HCT VFR BLD AUTO: 33.5 % (ref 36–46)
HGB BLD-MCNC: 10.9 G/DL (ref 12–16)
MAGNESIUM SERPL-MCNC: 2.43 MG/DL (ref 1.6–2.4)
MCH RBC QN AUTO: 30.4 PG (ref 26–34)
MCHC RBC AUTO-ENTMCNC: 32.5 G/DL (ref 32–36)
MCV RBC AUTO: 94 FL (ref 80–100)
NRBC BLD-RTO: 0 /100 WBCS (ref 0–0)
PHOSPHATE SERPL-MCNC: 4.3 MG/DL (ref 2.5–4.9)
PLATELET # BLD AUTO: 292 X10*3/UL (ref 150–450)
POTASSIUM SERPL-SCNC: 5.1 MMOL/L (ref 3.5–5.3)
RBC # BLD AUTO: 3.58 X10*6/UL (ref 4–5.2)
SODIUM SERPL-SCNC: 140 MMOL/L (ref 136–145)
WBC # BLD AUTO: 21.5 X10*3/UL (ref 4.4–11.3)

## 2023-12-12 PROCEDURE — 2500000001 HC RX 250 WO HCPCS SELF ADMINISTERED DRUGS (ALT 637 FOR MEDICARE OP)

## 2023-12-12 PROCEDURE — 99222 1ST HOSP IP/OBS MODERATE 55: CPT | Performed by: STUDENT IN AN ORGANIZED HEALTH CARE EDUCATION/TRAINING PROGRAM

## 2023-12-12 PROCEDURE — 3700000001 HC GENERAL ANESTHESIA TIME - INITIAL BASE CHARGE

## 2023-12-12 PROCEDURE — 0HRT077 REPLACEMENT OF RIGHT BREAST USING DEEP INFERIOR EPIGASTRIC ARTERY PERFORATOR FLAP, OPEN APPROACH: ICD-10-PCS

## 2023-12-12 PROCEDURE — 1170000001 HC PRIVATE ONCOLOGY ROOM DAILY

## 2023-12-12 PROCEDURE — 15860 IV NJX TST VASC FLO FLAP/GRF: CPT

## 2023-12-12 PROCEDURE — 19370 REVJ PERI-IMPLT CAPSULE BRST: CPT

## 2023-12-12 PROCEDURE — 0HNT0ZZ RELEASE RIGHT BREAST, OPEN APPROACH: ICD-10-PCS

## 2023-12-12 PROCEDURE — 76942 ECHO GUIDE FOR BIOPSY: CPT | Performed by: STUDENT IN AN ORGANIZED HEALTH CARE EDUCATION/TRAINING PROGRAM

## 2023-12-12 PROCEDURE — 15002 WOUND PREP TRK/ARM/LEG: CPT

## 2023-12-12 PROCEDURE — 11971 RMVL TIS XPNDR WO INSJ IMPLT: CPT

## 2023-12-12 PROCEDURE — A4217 STERILE WATER/SALINE, 500 ML: HCPCS

## 2023-12-12 PROCEDURE — P9045 ALBUMIN (HUMAN), 5%, 250 ML: HCPCS | Mod: JZ | Performed by: NURSE ANESTHETIST, CERTIFIED REGISTERED

## 2023-12-12 PROCEDURE — 2500000004 HC RX 250 GENERAL PHARMACY W/ HCPCS (ALT 636 FOR OP/ED): Performed by: NURSE PRACTITIONER

## 2023-12-12 PROCEDURE — 2500000004 HC RX 250 GENERAL PHARMACY W/ HCPCS (ALT 636 FOR OP/ED)

## 2023-12-12 PROCEDURE — 83735 ASSAY OF MAGNESIUM: CPT | Performed by: NURSE PRACTITIONER

## 2023-12-12 PROCEDURE — 2500000004 HC RX 250 GENERAL PHARMACY W/ HCPCS (ALT 636 FOR OP/ED): Performed by: ANESTHESIOLOGY

## 2023-12-12 PROCEDURE — 1270000001 HC SEMI-PRIVATE ONCOLOGY ROOM DAILY

## 2023-12-12 PROCEDURE — 3700000002 HC GENERAL ANESTHESIA TIME - EACH INCREMENTAL 1 MINUTE

## 2023-12-12 PROCEDURE — 11971 RMVL TIS XPNDR WO INSJ IMPLT: CPT | Performed by: PHYSICIAN ASSISTANT

## 2023-12-12 PROCEDURE — 15002 WOUND PREP TRK/ARM/LEG: CPT | Performed by: PHYSICIAN ASSISTANT

## 2023-12-12 PROCEDURE — 2500000005 HC RX 250 GENERAL PHARMACY W/O HCPCS

## 2023-12-12 PROCEDURE — 2720000007 HC OR 272 NO HCPCS

## 2023-12-12 PROCEDURE — 96372 THER/PROPH/DIAG INJ SC/IM: CPT | Performed by: NURSE PRACTITIONER

## 2023-12-12 PROCEDURE — 88304 TISSUE EXAM BY PATHOLOGIST: CPT | Mod: TC,SUR

## 2023-12-12 PROCEDURE — 0HPT0NZ REMOVAL OF TISSUE EXPANDER FROM RIGHT BREAST, OPEN APPROACH: ICD-10-PCS

## 2023-12-12 PROCEDURE — 19364 BRST RCNSTJ FREE FLAP: CPT | Performed by: PHYSICIAN ASSISTANT

## 2023-12-12 PROCEDURE — 3600000008 HC OR TIME - EACH INCREMENTAL 1 MINUTE - PROCEDURE LEVEL THREE

## 2023-12-12 PROCEDURE — 85027 COMPLETE CBC AUTOMATED: CPT | Performed by: NURSE PRACTITIONER

## 2023-12-12 PROCEDURE — 99233 SBSQ HOSP IP/OBS HIGH 50: CPT | Performed by: NURSE PRACTITIONER

## 2023-12-12 PROCEDURE — 19370 REVJ PERI-IMPLT CAPSULE BRST: CPT | Performed by: PHYSICIAN ASSISTANT

## 2023-12-12 PROCEDURE — 7100000001 HC RECOVERY ROOM TIME - INITIAL BASE CHARGE

## 2023-12-12 PROCEDURE — 2500000004 HC RX 250 GENERAL PHARMACY W/ HCPCS (ALT 636 FOR OP/ED): Performed by: NURSE ANESTHETIST, CERTIFIED REGISTERED

## 2023-12-12 PROCEDURE — 2500000005 HC RX 250 GENERAL PHARMACY W/O HCPCS: Performed by: NURSE ANESTHETIST, CERTIFIED REGISTERED

## 2023-12-12 PROCEDURE — 2500000001 HC RX 250 WO HCPCS SELF ADMINISTERED DRUGS (ALT 637 FOR MEDICARE OP): Performed by: PHYSICIAN ASSISTANT

## 2023-12-12 PROCEDURE — 3600000003 HC OR TIME - INITIAL BASE CHARGE - PROCEDURE LEVEL THREE

## 2023-12-12 PROCEDURE — 19364 BRST RCNSTJ FREE FLAP: CPT

## 2023-12-12 PROCEDURE — 88304 TISSUE EXAM BY PATHOLOGIST: CPT | Performed by: STUDENT IN AN ORGANIZED HEALTH CARE EDUCATION/TRAINING PROGRAM

## 2023-12-12 PROCEDURE — 80069 RENAL FUNCTION PANEL: CPT | Performed by: NURSE PRACTITIONER

## 2023-12-12 PROCEDURE — 7100000002 HC RECOVERY ROOM TIME - EACH INCREMENTAL 1 MINUTE

## 2023-12-12 RX ORDER — LIDOCAINE HYDROCHLORIDE 10 MG/ML
0.1 INJECTION INFILTRATION; PERINEURAL ONCE
Status: DISCONTINUED | OUTPATIENT
Start: 2023-12-12 | End: 2023-12-12 | Stop reason: HOSPADM

## 2023-12-12 RX ORDER — TRAMADOL HYDROCHLORIDE 50 MG/1
50 TABLET ORAL EVERY 4 HOURS PRN
Status: DISCONTINUED | OUTPATIENT
Start: 2023-12-12 | End: 2023-12-18 | Stop reason: HOSPADM

## 2023-12-12 RX ORDER — ALBUMIN HUMAN 50 G/1000ML
SOLUTION INTRAVENOUS AS NEEDED
Status: DISCONTINUED | OUTPATIENT
Start: 2023-12-12 | End: 2023-12-12

## 2023-12-12 RX ORDER — ONDANSETRON HYDROCHLORIDE 2 MG/ML
INJECTION, SOLUTION INTRAVENOUS AS NEEDED
Status: DISCONTINUED | OUTPATIENT
Start: 2023-12-12 | End: 2023-12-12

## 2023-12-12 RX ORDER — HYDROMORPHONE HYDROCHLORIDE 1 MG/ML
0.2 INJECTION, SOLUTION INTRAMUSCULAR; INTRAVENOUS; SUBCUTANEOUS EVERY 5 MIN PRN
Status: DISCONTINUED | OUTPATIENT
Start: 2023-12-12 | End: 2023-12-12 | Stop reason: HOSPADM

## 2023-12-12 RX ORDER — DROPERIDOL 2.5 MG/ML
0.62 INJECTION, SOLUTION INTRAMUSCULAR; INTRAVENOUS ONCE AS NEEDED
Status: DISCONTINUED | OUTPATIENT
Start: 2023-12-12 | End: 2023-12-12 | Stop reason: HOSPADM

## 2023-12-12 RX ORDER — LIDOCAINE HYDROCHLORIDE 20 MG/ML
INJECTION, SOLUTION INFILTRATION; PERINEURAL AS NEEDED
Status: DISCONTINUED | OUTPATIENT
Start: 2023-12-12 | End: 2023-12-12

## 2023-12-12 RX ORDER — CEFAZOLIN SODIUM 2 G/100ML
2 INJECTION, SOLUTION INTRAVENOUS EVERY 8 HOURS
Status: DISCONTINUED | OUTPATIENT
Start: 2023-12-12 | End: 2023-12-13

## 2023-12-12 RX ORDER — FENTANYL CITRATE 50 UG/ML
INJECTION, SOLUTION INTRAMUSCULAR; INTRAVENOUS AS NEEDED
Status: DISCONTINUED | OUTPATIENT
Start: 2023-12-12 | End: 2023-12-12

## 2023-12-12 RX ORDER — KETOROLAC TROMETHAMINE 30 MG/ML
INJECTION, SOLUTION INTRAMUSCULAR; INTRAVENOUS AS NEEDED
Status: DISCONTINUED | OUTPATIENT
Start: 2023-12-12 | End: 2023-12-12

## 2023-12-12 RX ORDER — TRAMADOL HYDROCHLORIDE 50 MG/1
100 TABLET ORAL EVERY 4 HOURS PRN
Status: DISCONTINUED | OUTPATIENT
Start: 2023-12-12 | End: 2023-12-18 | Stop reason: HOSPADM

## 2023-12-12 RX ORDER — NALOXONE HYDROCHLORIDE 0.4 MG/ML
0.2 INJECTION, SOLUTION INTRAMUSCULAR; INTRAVENOUS; SUBCUTANEOUS EVERY 5 MIN PRN
Status: DISCONTINUED | OUTPATIENT
Start: 2023-12-12 | End: 2023-12-18 | Stop reason: HOSPADM

## 2023-12-12 RX ORDER — DOCUSATE SODIUM 100 MG/1
100 CAPSULE, LIQUID FILLED ORAL 2 TIMES DAILY
Status: DISCONTINUED | OUTPATIENT
Start: 2023-12-12 | End: 2023-12-18 | Stop reason: HOSPADM

## 2023-12-12 RX ORDER — MEPERIDINE HYDROCHLORIDE 25 MG/ML
12.5 INJECTION INTRAMUSCULAR; INTRAVENOUS; SUBCUTANEOUS EVERY 10 MIN PRN
Status: DISCONTINUED | OUTPATIENT
Start: 2023-12-12 | End: 2023-12-12 | Stop reason: HOSPADM

## 2023-12-12 RX ORDER — SODIUM CHLORIDE, SODIUM LACTATE, POTASSIUM CHLORIDE, CALCIUM CHLORIDE 600; 310; 30; 20 MG/100ML; MG/100ML; MG/100ML; MG/100ML
INJECTION, SOLUTION INTRAVENOUS CONTINUOUS PRN
Status: DISCONTINUED | OUTPATIENT
Start: 2023-12-12 | End: 2023-12-12

## 2023-12-12 RX ORDER — ONDANSETRON HYDROCHLORIDE 2 MG/ML
4 INJECTION, SOLUTION INTRAVENOUS EVERY 8 HOURS PRN
Status: DISCONTINUED | OUTPATIENT
Start: 2023-12-12 | End: 2023-12-18 | Stop reason: HOSPADM

## 2023-12-12 RX ORDER — SODIUM CHLORIDE 9 MG/ML
100 INJECTION, SOLUTION INTRAVENOUS CONTINUOUS
Status: DISCONTINUED | OUTPATIENT
Start: 2023-12-12 | End: 2023-12-14

## 2023-12-12 RX ORDER — TAMOXIFEN CITRATE 20 MG/1
20 TABLET ORAL DAILY
Status: DISCONTINUED | OUTPATIENT
Start: 2023-12-12 | End: 2023-12-12

## 2023-12-12 RX ORDER — HYDROMORPHONE HYDROCHLORIDE 1 MG/ML
0.5 INJECTION, SOLUTION INTRAMUSCULAR; INTRAVENOUS; SUBCUTANEOUS EVERY 5 MIN PRN
Status: DISCONTINUED | OUTPATIENT
Start: 2023-12-12 | End: 2023-12-12 | Stop reason: HOSPADM

## 2023-12-12 RX ORDER — ROPIVACAINE IN 0.9% SOD CHL/PF 0.2 %
20 PLASTIC BAG, INJECTION (ML) EPIDURAL CONTINUOUS
Status: DISCONTINUED | OUTPATIENT
Start: 2023-12-12 | End: 2023-12-16

## 2023-12-12 RX ORDER — ASPIRIN 81 MG/1
81 TABLET ORAL DAILY
Status: DISCONTINUED | OUTPATIENT
Start: 2023-12-13 | End: 2023-12-18 | Stop reason: HOSPADM

## 2023-12-12 RX ORDER — SODIUM CHLORIDE 0.9 G/100ML
IRRIGANT IRRIGATION AS NEEDED
Status: DISCONTINUED | OUTPATIENT
Start: 2023-12-12 | End: 2023-12-12 | Stop reason: HOSPADM

## 2023-12-12 RX ORDER — METHOCARBAMOL 100 MG/ML
1000 INJECTION, SOLUTION INTRAMUSCULAR; INTRAVENOUS EVERY 8 HOURS
Status: DISCONTINUED | OUTPATIENT
Start: 2023-12-12 | End: 2023-12-15

## 2023-12-12 RX ORDER — GABAPENTIN 300 MG/1
600 CAPSULE ORAL EVERY 12 HOURS
Status: DISCONTINUED | OUTPATIENT
Start: 2023-12-12 | End: 2023-12-18 | Stop reason: HOSPADM

## 2023-12-12 RX ORDER — KETOROLAC TROMETHAMINE 30 MG/ML
30 INJECTION, SOLUTION INTRAMUSCULAR; INTRAVENOUS EVERY 6 HOURS SCHEDULED
Status: DISPENSED | OUTPATIENT
Start: 2023-12-12 | End: 2023-12-13

## 2023-12-12 RX ORDER — MIDAZOLAM HYDROCHLORIDE 1 MG/ML
INJECTION INTRAMUSCULAR; INTRAVENOUS AS NEEDED
Status: DISCONTINUED | OUTPATIENT
Start: 2023-12-12 | End: 2023-12-12

## 2023-12-12 RX ORDER — HEPARIN SODIUM 5000 [USP'U]/ML
5000 INJECTION, SOLUTION INTRAVENOUS; SUBCUTANEOUS EVERY 8 HOURS
Status: DISCONTINUED | OUTPATIENT
Start: 2023-12-12 | End: 2023-12-15

## 2023-12-12 RX ORDER — ONDANSETRON HYDROCHLORIDE 2 MG/ML
4 INJECTION, SOLUTION INTRAVENOUS ONCE AS NEEDED
Status: COMPLETED | OUTPATIENT
Start: 2023-12-12 | End: 2023-12-12

## 2023-12-12 RX ORDER — PROPOFOL 10 MG/ML
INJECTION, EMULSION INTRAVENOUS CONTINUOUS PRN
Status: DISCONTINUED | OUTPATIENT
Start: 2023-12-12 | End: 2023-12-12

## 2023-12-12 RX ORDER — MIDAZOLAM HYDROCHLORIDE 1 MG/ML
INJECTION, SOLUTION INTRAMUSCULAR; INTRAVENOUS AS NEEDED
Status: DISCONTINUED | OUTPATIENT
Start: 2023-12-12 | End: 2023-12-12

## 2023-12-12 RX ORDER — CEFAZOLIN 1 G/1
INJECTION, POWDER, FOR SOLUTION INTRAVENOUS AS NEEDED
Status: DISCONTINUED | OUTPATIENT
Start: 2023-12-12 | End: 2023-12-12

## 2023-12-12 RX ORDER — HEPARIN SODIUM 5000 [USP'U]/ML
5000 INJECTION, SOLUTION INTRAVENOUS; SUBCUTANEOUS EVERY 8 HOURS
Status: DISCONTINUED | OUTPATIENT
Start: 2023-12-13 | End: 2023-12-12

## 2023-12-12 RX ORDER — SCOLOPAMINE TRANSDERMAL SYSTEM 1 MG/1
1 PATCH, EXTENDED RELEASE TRANSDERMAL
Status: DISCONTINUED | OUTPATIENT
Start: 2023-12-12 | End: 2023-12-18

## 2023-12-12 RX ORDER — ROCURONIUM BROMIDE 10 MG/ML
INJECTION, SOLUTION INTRAVENOUS AS NEEDED
Status: DISCONTINUED | OUTPATIENT
Start: 2023-12-12 | End: 2023-12-12

## 2023-12-12 RX ORDER — DEXAMETHASONE SODIUM PHOSPHATE 4 MG/ML
INJECTION, SOLUTION INTRA-ARTICULAR; INTRALESIONAL; INTRAMUSCULAR; INTRAVENOUS; SOFT TISSUE AS NEEDED
Status: DISCONTINUED | OUTPATIENT
Start: 2023-12-12 | End: 2023-12-12

## 2023-12-12 RX ORDER — ACETAMINOPHEN 325 MG/1
650 TABLET ORAL EVERY 6 HOURS SCHEDULED
Status: DISCONTINUED | OUTPATIENT
Start: 2023-12-12 | End: 2023-12-18 | Stop reason: HOSPADM

## 2023-12-12 RX ORDER — ADHESIVE BANDAGE
10 BANDAGE TOPICAL DAILY PRN
Status: DISCONTINUED | OUTPATIENT
Start: 2023-12-12 | End: 2023-12-18 | Stop reason: HOSPADM

## 2023-12-12 RX ORDER — PROPOFOL 10 MG/ML
INJECTION, EMULSION INTRAVENOUS AS NEEDED
Status: DISCONTINUED | OUTPATIENT
Start: 2023-12-12 | End: 2023-12-12

## 2023-12-12 RX ORDER — HYDROMORPHONE HYDROCHLORIDE 1 MG/ML
INJECTION, SOLUTION INTRAMUSCULAR; INTRAVENOUS; SUBCUTANEOUS AS NEEDED
Status: DISCONTINUED | OUTPATIENT
Start: 2023-12-12 | End: 2023-12-12

## 2023-12-12 RX ORDER — GABAPENTIN 300 MG/1
900 CAPSULE ORAL ONCE
Status: COMPLETED | OUTPATIENT
Start: 2023-12-12 | End: 2023-12-12

## 2023-12-12 RX ORDER — DIPHENHYDRAMINE HYDROCHLORIDE 50 MG/ML
25 INJECTION INTRAMUSCULAR; INTRAVENOUS EVERY 4 HOURS PRN
Status: DISCONTINUED | OUTPATIENT
Start: 2023-12-12 | End: 2023-12-18 | Stop reason: HOSPADM

## 2023-12-12 RX ORDER — SODIUM CHLORIDE, SODIUM LACTATE, POTASSIUM CHLORIDE, CALCIUM CHLORIDE 600; 310; 30; 20 MG/100ML; MG/100ML; MG/100ML; MG/100ML
100 INJECTION, SOLUTION INTRAVENOUS CONTINUOUS
Status: DISCONTINUED | OUTPATIENT
Start: 2023-12-12 | End: 2023-12-12 | Stop reason: HOSPADM

## 2023-12-12 RX ORDER — PAPAVERINE HYDROCHLORIDE 30 MG/ML
INJECTION INTRAMUSCULAR; INTRAVENOUS AS NEEDED
Status: DISCONTINUED | OUTPATIENT
Start: 2023-12-12 | End: 2023-12-12 | Stop reason: HOSPADM

## 2023-12-12 RX ORDER — ACETAMINOPHEN 325 MG/1
975 TABLET ORAL ONCE
Status: COMPLETED | OUTPATIENT
Start: 2023-12-12 | End: 2023-12-12

## 2023-12-12 RX ORDER — ONDANSETRON 4 MG/1
4 TABLET, FILM COATED ORAL EVERY 8 HOURS PRN
Status: DISCONTINUED | OUTPATIENT
Start: 2023-12-12 | End: 2023-12-18 | Stop reason: HOSPADM

## 2023-12-12 RX ORDER — HYDROMORPHONE HYDROCHLORIDE 1 MG/ML
0.2 INJECTION, SOLUTION INTRAMUSCULAR; INTRAVENOUS; SUBCUTANEOUS EVERY 4 HOURS PRN
Status: DISCONTINUED | OUTPATIENT
Start: 2023-12-12 | End: 2023-12-18

## 2023-12-12 RX ORDER — ACETAMINOPHEN 325 MG/1
650 TABLET ORAL EVERY 4 HOURS PRN
Status: DISCONTINUED | OUTPATIENT
Start: 2023-12-12 | End: 2023-12-12 | Stop reason: HOSPADM

## 2023-12-12 RX ADMIN — FENTANYL CITRATE 50 MCG: 50 INJECTION, SOLUTION INTRAMUSCULAR; INTRAVENOUS at 09:15

## 2023-12-12 RX ADMIN — SUGAMMADEX 200 MG: 100 INJECTION, SOLUTION INTRAVENOUS at 17:53

## 2023-12-12 RX ADMIN — HYDROMORPHONE HYDROCHLORIDE 0.5 MG: 1 INJECTION, SOLUTION INTRAMUSCULAR; INTRAVENOUS; SUBCUTANEOUS at 20:16

## 2023-12-12 RX ADMIN — ROCURONIUM 30 MG: 50 INJECTION, SOLUTION INTRAVENOUS at 14:25

## 2023-12-12 RX ADMIN — Medication: at 21:45

## 2023-12-12 RX ADMIN — LIDOCAINE HYDROCHLORIDE 100 MG: 20 INJECTION, SOLUTION INFILTRATION; PERINEURAL at 08:29

## 2023-12-12 RX ADMIN — SODIUM CHLORIDE, SODIUM LACTATE, POTASSIUM CHLORIDE, AND CALCIUM CHLORIDE: 600; 310; 30; 20 INJECTION, SOLUTION INTRAVENOUS at 09:58

## 2023-12-12 RX ADMIN — PROPOFOL 50 MG: 10 INJECTION, EMULSION INTRAVENOUS at 09:19

## 2023-12-12 RX ADMIN — ONDANSETRON 4 MG: 2 INJECTION INTRAMUSCULAR; INTRAVENOUS at 17:27

## 2023-12-12 RX ADMIN — ACETAMINOPHEN 975 MG: 325 TABLET ORAL at 06:48

## 2023-12-12 RX ADMIN — ROCURONIUM 10 MG: 50 INJECTION, SOLUTION INTRAVENOUS at 15:17

## 2023-12-12 RX ADMIN — MIDAZOLAM 2 MG: 1 INJECTION INTRAMUSCULAR; INTRAVENOUS at 08:26

## 2023-12-12 RX ADMIN — HYDROMORPHONE HYDROCHLORIDE 0.5 MG: 1 INJECTION, SOLUTION INTRAMUSCULAR; INTRAVENOUS; SUBCUTANEOUS at 19:58

## 2023-12-12 RX ADMIN — SODIUM CHLORIDE 100 ML/HR: 9 INJECTION, SOLUTION INTRAVENOUS at 22:00

## 2023-12-12 RX ADMIN — SODIUM CHLORIDE, SODIUM LACTATE, POTASSIUM CHLORIDE, AND CALCIUM CHLORIDE: 600; 310; 30; 20 INJECTION, SOLUTION INTRAVENOUS at 09:00

## 2023-12-12 RX ADMIN — ALBUMIN (HUMAN) 250 ML: 12.5 SOLUTION INTRAVENOUS at 13:09

## 2023-12-12 RX ADMIN — KETOROLAC TROMETHAMINE 30 MG: 30 INJECTION, SOLUTION INTRAMUSCULAR; INTRAVENOUS at 17:52

## 2023-12-12 RX ADMIN — PROPOFOL 25 MCG/KG/MIN: 10 INJECTION, EMULSION INTRAVENOUS at 08:54

## 2023-12-12 RX ADMIN — ROCURONIUM 20 MG: 50 INJECTION, SOLUTION INTRAVENOUS at 12:57

## 2023-12-12 RX ADMIN — FENTANYL CITRATE 25 MCG: 50 INJECTION, SOLUTION INTRAMUSCULAR; INTRAVENOUS at 12:08

## 2023-12-12 RX ADMIN — FENTANYL CITRATE 50 MCG: 50 INJECTION, SOLUTION INTRAMUSCULAR; INTRAVENOUS at 08:38

## 2023-12-12 RX ADMIN — ONDANSETRON 4 MG: 2 INJECTION INTRAMUSCULAR; INTRAVENOUS at 20:34

## 2023-12-12 RX ADMIN — GABAPENTIN 900 MG: 300 CAPSULE ORAL at 06:50

## 2023-12-12 RX ADMIN — ROCURONIUM 40 MG: 50 INJECTION, SOLUTION INTRAVENOUS at 10:51

## 2023-12-12 RX ADMIN — SCOLOPAMINE TRANSDERMAL SYSTEM 1 PATCH: 1 PATCH, EXTENDED RELEASE TRANSDERMAL at 23:30

## 2023-12-12 RX ADMIN — HEPARIN SODIUM 5000 UNITS: 5000 INJECTION INTRAVENOUS; SUBCUTANEOUS at 22:42

## 2023-12-12 RX ADMIN — ROCURONIUM 30 MG: 50 INJECTION, SOLUTION INTRAVENOUS at 09:57

## 2023-12-12 RX ADMIN — SODIUM CHLORIDE, POTASSIUM CHLORIDE, SODIUM LACTATE AND CALCIUM CHLORIDE: 600; 310; 30; 20 INJECTION, SOLUTION INTRAVENOUS at 13:51

## 2023-12-12 RX ADMIN — CEFAZOLIN 2 G: 1 INJECTION, POWDER, FOR SOLUTION INTRAMUSCULAR; INTRAVENOUS at 12:16

## 2023-12-12 RX ADMIN — HYDROMORPHONE HYDROCHLORIDE 0.5 MG: 1 INJECTION, SOLUTION INTRAMUSCULAR; INTRAVENOUS; SUBCUTANEOUS at 14:31

## 2023-12-12 RX ADMIN — HYDROMORPHONE HYDROCHLORIDE 0.5 MG: 1 INJECTION, SOLUTION INTRAMUSCULAR; INTRAVENOUS; SUBCUTANEOUS at 18:58

## 2023-12-12 RX ADMIN — FENTANYL CITRATE 25 MCG: 50 INJECTION, SOLUTION INTRAMUSCULAR; INTRAVENOUS at 11:45

## 2023-12-12 RX ADMIN — CEFAZOLIN 2 G: 1 INJECTION, POWDER, FOR SOLUTION INTRAMUSCULAR; INTRAVENOUS at 16:10

## 2023-12-12 RX ADMIN — FENTANYL CITRATE 50 MCG: 50 INJECTION, SOLUTION INTRAMUSCULAR; INTRAVENOUS at 12:18

## 2023-12-12 RX ADMIN — METHOCARBAMOL 1000 MG: 1000 INJECTION, SOLUTION INTRAMUSCULAR; INTRAVENOUS at 22:41

## 2023-12-12 RX ADMIN — DEXAMETHASONE SODIUM PHOSPHATE 4 MG: 4 INJECTION, SOLUTION INTRAMUSCULAR; INTRAVENOUS at 08:38

## 2023-12-12 RX ADMIN — CEFAZOLIN 2 G: 1 INJECTION, POWDER, FOR SOLUTION INTRAMUSCULAR; INTRAVENOUS at 08:28

## 2023-12-12 RX ADMIN — PROPOFOL 100 MG: 10 INJECTION, EMULSION INTRAVENOUS at 08:29

## 2023-12-12 RX ADMIN — PROPOFOL 50 MG: 10 INJECTION, EMULSION INTRAVENOUS at 09:53

## 2023-12-12 RX ADMIN — ROCURONIUM 80 MG: 50 INJECTION, SOLUTION INTRAVENOUS at 08:32

## 2023-12-12 RX ADMIN — HYDROMORPHONE HYDROCHLORIDE 0.5 MG: 1 INJECTION, SOLUTION INTRAMUSCULAR; INTRAVENOUS; SUBCUTANEOUS at 16:05

## 2023-12-12 RX ADMIN — SODIUM CHLORIDE, POTASSIUM CHLORIDE, SODIUM LACTATE AND CALCIUM CHLORIDE: 600; 310; 30; 20 INJECTION, SOLUTION INTRAVENOUS at 08:20

## 2023-12-12 SDOH — HEALTH STABILITY: MENTAL HEALTH: CURRENT SMOKER: 0

## 2023-12-12 ASSESSMENT — PAIN SCALES - GENERAL
PAINLEVEL_OUTOF10: 5 - MODERATE PAIN
PAINLEVEL_OUTOF10: 0 - NO PAIN
PAINLEVEL_OUTOF10: 8
PAINLEVEL_OUTOF10: 8
PAINLEVEL_OUTOF10: 5 - MODERATE PAIN
PAINLEVEL_OUTOF10: 7
PAINLEVEL_OUTOF10: 0 - NO PAIN
PAINLEVEL_OUTOF10: 8
PAINLEVEL_OUTOF10: 9
PAINLEVEL_OUTOF10: 5 - MODERATE PAIN

## 2023-12-12 ASSESSMENT — PAIN SCALES - PAIN ASSESSMENT IN ADVANCED DEMENTIA (PAINAD)
BODYLANGUAGE: RELAXED
TOTALSCORE: 0
CONSOLABILITY: NO NEED TO CONSOLE
FACIALEXPRESSION: SMILING OR INEXPRESSIVE
BREATHING: NORMAL

## 2023-12-12 ASSESSMENT — PAIN - FUNCTIONAL ASSESSMENT
PAIN_FUNCTIONAL_ASSESSMENT: 0-10

## 2023-12-12 ASSESSMENT — COLUMBIA-SUICIDE SEVERITY RATING SCALE - C-SSRS
1. IN THE PAST MONTH, HAVE YOU WISHED YOU WERE DEAD OR WISHED YOU COULD GO TO SLEEP AND NOT WAKE UP?: NO
2. HAVE YOU ACTUALLY HAD ANY THOUGHTS OF KILLING YOURSELF?: NO
6. HAVE YOU EVER DONE ANYTHING, STARTED TO DO ANYTHING, OR PREPARED TO DO ANYTHING TO END YOUR LIFE?: NO

## 2023-12-12 NOTE — HOSPITAL COURSE
BRIEF HISTORY:    Lorraine Liu is a 53 y.o. female with a past medical history of meningioma and R breast cancer s/p mastectomy with immediate reconstruction via tissue expander placement on 3/19/2021. She presented for further R breast reconstruction via EMI free flap on 12/12 with Dr. Watkins of Plastic Surgery.     HOSPITAL COURSE:    Patient admited to the plastic surgery service post operatively for close monitoring following right unilateral EMI breast reconstruction. Hospital course fairly uncomplicated. On Friday 12/15, an IV infiltrated causing left arm swelling. An ultrasound was completed to rule out blood clot which came back negative. You were evaluated by physical therapy on 12/16, and they recommended low intensity physical therapy 5x/week. On 12/17 an abdominal ultrasound was ordered given abdominal feeling taught and tender on palpation. Results were limited and possibly suggested free fluid collections. Follow up abdominal CT was suggested which revealed postsurgical changes of lower abdominal wall with no measurable fluid collection. Trace bilat pleural effusions with adjacent atelectasis.     CONSULTATIONS:  Acute pain consulted perioperatively and placed/managed nerve blocks for post operative pain management. Patient tolerated well and catheters were removed at bedside on 12/16 without complication.     DAY OF DISCHARGE:    On the day of discharge, the patient was seen and evaluated by the Plastic Surgery team and deemed suitable for discharge.  There were no significant events overnight. Vitals were reviewed and within normal limits. Labs were stable at discharge. On day of discharge the patient was tolerating a diet, pain was controlled on PO pain medication, was ambulating well and voiding spontaneously. The patient was given detailed discharge instructions and were scheduled to follow up as an outpatient.

## 2023-12-12 NOTE — PROCEDURES
Peripheral Block    Patient location during procedure: pre-op  Reason for block: post-op pain management  Staffing  Performed: resident   Authorized by: Kale Peterson DO    Performed by: Kale Peterson DO  Preanesthetic Checklist  Completed: patient identified, IV checked, site marked, risks and benefits discussed, surgical consent, monitors and equipment checked, pre-op evaluation and timeout performed   Timeout performed at:   Peripheral Block  Patient position: sitting  Prep: ChloraPrep  Patient monitoring: heart rate and continuous pulse ox  Block type: JUANIS  Injection technique: catheter  Guidance: ultrasound guided  Local infiltration: ropivacaine  Infiltration strength: 0.5 %  Dose: 30 mL  Needle  Needle type: Tuohy   Needle gauge: 26 G  Needle length: 8 cm  Needle localization: ultrasound guidance     image stored in chart  Catheter type: open end  Assessment  Injection assessment: negative aspiration for heme, no paresthesia on injection, incremental injection and local visualized surrounding nerve on ultrasound  Additional Notes  Paravertebral and Erector spinae plane block with catheters: Informed consent obtained.  Risks, benefits, and alternatives discussed.  ASA monitors placed, and timeout performed.  Patient positioned, prepped with chlorhexidine, and draped with sterile towels.  Ultrasound guidance used to visualize the paravertebral muscles at the TP/costal junction at T5 bilaterally and T8 bilaterally.  Good visualization of the needle throughout duration of the procedure.  Aspiration was negative.  10 cc of 0.5 percent ropivacaine injected with visualization of spread bilaterally. Needle then placed at TP/costal junction at level of erector spinae muscles.  Catheters threaded and secured. Patient tolerated procedure well.    Timeout by Dmitriy PAINTER.

## 2023-12-12 NOTE — CONSULTS
Consults  Acute Pain Service  Lorraine iLu is a 53 y.o. year old female patient who presents for release contracture torso right, removal tissue expander right, reconstruction breast deep inferior epigastric  flap with Dr. Watkins. Acute Pain consulted for block for postoperative pain control.     Anticipated Postop Pain Issues -   Palliative: typically relieved with IV analgesics and regional local anesthetics  Provocative: typically with movement  Quality: typically burning and aching  Radiation: typically none  Severity: typically severe 8-10/10  Timing: typically constant    Past Medical History:   Diagnosis Date    Chest pain     Saw Dr. Mclaughlin in 2022    Ductal carcinoma of breast (CMS/HCC) 2021    Hypertension     F/W PCP    Melanoma (CMS/HCC)     Meningioma (CMS/HCC)     Shortness of breath     Ct Calcium Scoring 3/18/2022: IMPRESSION: Coronary artery calcium score of  0.    Stress incontinence         Past Surgical History:   Procedure Laterality Date    BI BREAST CYST ASPIRATION LEFT Left 2016    BI BREAST CYST ASPIRATION LEFT [facility] ANCILLARY LEGACY    BI MAMMO GUIDED LOCALIZATION BREAST RIGHT Right 02/10/2021    BI MAMMO GUIDED LOCALIZATION BREAST RIGHT 2/10/2021 [facility] CLINICAL LEGACY    BI STEREOTACTIC GUIDED BREAST RIGHT LOCALIZATION AND BIOPSY Right 2021    BI STEREOTACTIC GUIDED BREAST LOCALIZATION AND BIOPSY RIGHT [facility] CLINICAL LEGACY    BI US GUIDED BREAST LOCALIZATION AND BIOPSY RIGHT Right 2021    BI US GUIDED BREAST LOCALIZATION AND BIOPSY RIGHT [facility] CLINICAL LEGACY    BREAST LUMPECTOMY Right 02/10/2021     SECTION, LOW TRANSVERSE      x2    CT ABDOMEN PELVIS ANGIOGRAM W AND/OR WO IV CONTRAST  2023    CT ABDOMEN PELVIS ANGIOGRAM W AND/OR WO IV CONTRAST 2023 [id number] CT    HYSTERECTOMY      d/t heavy menses    MASTECTOMY Right     OTHER SURGICAL HISTORY  2021    Breast reconstruction    OTHER SURGICAL HISTORY      Melanoma Excision         Family History   Problem Relation Name Age of Onset    Diabetes Mother      Melanoma Father      Thyroid cancer Father      Melanoma Brother      Leukemia Brother      Other (brain tumor) Brother          Social History     Socioeconomic History    Marital status:      Spouse name: Not on file    Number of children: Not on file    Years of education: Not on file    Highest education level: Not on file   Occupational History    Not on file   Tobacco Use    Smoking status: Never    Smokeless tobacco: Never   Vaping Use    Vaping Use: Never used   Substance and Sexual Activity    Alcohol use: Never    Drug use: Never    Sexual activity: Not on file   Other Topics Concern    Not on file   Social History Narrative    Not on file     Social Determinants of Health     Financial Resource Strain: Not on file   Food Insecurity: Not on file   Transportation Needs: Not on file   Physical Activity: Not on file   Stress: Not on file   Social Connections: Not on file   Intimate Partner Violence: Not on file   Housing Stability: Not on file        No Known Allergies      Review of Systems  Gen: No fatigue, anorexia, insomnia, fever.   Eyes: No vision loss, double vision, drainage, eye pain.   ENT: No pharyngitis, dry mouth, no hearing changes or ear discharge  Cardiac: No chest pain, palpitations, syncope, near syncope.   Pulmonary: No shortness of breath, cough, hemoptysis.   Heme/lymph: No swollen glands, fever, bleeding.   GI: No abdominal pain, change in bowel habits, melena, hematemesis, hematochezia, nausea, vomiting, diarrhea.   : No discharge, dysuria, frequency, urgency, hematuria.  Endo: No polyuria or weight loss.   Musculoskeletal: Negative for any pain or loss of ROM/weakness  Skin: No rashes or lesions  Neuro: Normal speech, no numbness or weakness. No gait difficulties  Review of systems is otherwise negative unless stated above or in history of present illness.    Physical Exam:  Constitutional:  no  distress, alert and cooperative  Eyes: clear sclera  Head/Neck: No apparent injury, trachea midline  Respiratory/Thorax: Patent airways, thorax symmetric, breathing comfortably  Cardiovascular: no pitting edema  Gastrointestinal: Nondistended  Musculoskeletal: ROM intact  Extremities: no clubbing  Neurological: alert, sánchez x4  Psychological: Appropriate affect    No results found for this or any previous visit (from the past 24 hour(s)).     Plan:    - Single shot bilateral paravertebral blocks at T5 and T8 as well as bilateral erector spinae plane blocks with catheters performed preoperatively on 12/12/23.  - Ambit ball with Ropivacaine 0.2%/NaCl 0.9% 500mL, Rate 7 cc/hr bilaterally  - Ambit medication will not interfere with pain medication prescribed by the primary team.   - Please be aware of local anesthetic toxic dose and absorption variability before considering lidocaine patches  - Acute pain service will follow while catheters in place  - Rest of pain management per primary team    Acute Pain Resident  pg [phone] ph [phone]

## 2023-12-12 NOTE — DISCHARGE INSTRUCTIONS
Plastic Surgery Post Discharge Instructions  You were admitted to [facility] for postoperative monitoring and control of pain following right breast reconstruction via EMI free flap on 12/12/2023 with Dr. Watkins of plastic surgery. Included below are post-discharge instructions and details regarding follow-up.     Thank you for allowing us to participate in your care and we wish you the best!    Best Regards,  [facility]  Department of Plastic and Reconstructive Surgery    Activity:  Activity as tolerated. No pushing, pulling or lifting objects greater than 5 pounds. Ensure no compression is applied to the free flap sites. Please do not apply heat directly to free flap without barrier in place. Maintain modified beach chair position when sitting and walk slightly hunched over to prevent tension on abdominal incisions.     You may locally bathe following discharge. Avoid soaking or submerging surgical incisions/sites or wetting wound vac dressing or device.     Surgical Site/Wound Care:  You are being discharge with a Prevena incisional wound vac in place over a closed surgical incision to your abdomen. Please allow Prevena wound vac dressing to remain in place until output follow-up with plastic surgery. When showering, do not allow the wound vac dressing or device to become wet. When resting, please make sure to plug in the device with the supplied power cord to maintain the battery. The device has a power button at the center which is encircled by green lights. There will be one less light illuminated each day (every 24 hours) which is to be expected, and signifies the count down of the duration of the vac device. If you experience any issues with your wound vac including alarms, malfunction or dressing issues please refer to the provided instruction manual or contact the plastic surgery office at [phone].     Okay to leave right breast incisions open to  air. Avoid application of creams, lotions or ointments to surgical site, no soaking or scrubbing of surgical sites.     Continue to monitor flap for changes in general appearance, color, temperature and turgor. Please additionally monitor for any developing signs of infection which may include increased redness, swelling, fever/chills, green/yellow drainage, or foul odor from surgical sites or wounds. If any signs of infection or changes in flap appearance are to occur, please immediately contact the plastic surgery office.     Drain care:  You are being discharged with one DEBBIE drain to the right breast. To empty the drain, open the cap, tip into cup and squeeze to empty. Squeeze drain flat then replace the cap.  Please empty the drain and record its output 3 times a day and bring these numbers to your follow up appointment.  The drain output should decrease and the color of the drainage should become lighter (red to pink to yellow).  This drain is sutured into place.  Keep the area around the drain clean and dry.  You may use mild soap and water to cleanse around the drain.  It is ok to shower; do not soak in a tub. Change the gauze around the drain as needed.  Call the office if you notice drastic changes in drain output, bloody drain output, or redness/drainage around insertion site.    Nutrition:  You may resume a regular diet following surgery. Ensure that you are drinking an adequate amount of fluids to maintain hydration.     Medication Instructions:  You may resume use of your home prescribed mediations as previously directed following discharge from the hospital. If you were taking medications prior to your surgery and they are not listed on your discharge homegoing instructions medications list, consult your MD before you resume these medications.    May resume Tamoxifen 14 days postop (12/26/23)  May resume your blood pressure medication at discharge.     Remember when taking Acetaminophen, do NOT exceed  more than 1000 milligrams (mg) per dose or more than 4000 mg total per day. Taking too much Acetaminophen at one time can damage your liver. The maximum amount of ibuprofen in adults is 800 mg per dose or 3200 mg per day. Call your MD if you have any questions about your medications. To prevent constipation while taking narcotic pain medications, please utilize your prescribed bowel regimen, ensure that you drink plenty of water, eat fiber rich foods (a good source is fruit) and increase activity progressively.    DO NOT drive a car while utilizing narcotic pain medications and until cleared by MD at follow-up appointment. Driving or operating heavy machinery, lawnmowers or power tools while taking opiod/narcotic pain medications may impair your judgement.    Call Physician If:  Contact the plastic surgery office for any questions and/or concerns regarding the surgical incision/site.  1. [phone] if Monday-Friday (8 a.m. - 4:30 p.m.)  2. [phone] and ask for the Plastic Surgery team on call provider if after hours or on weekends  3. Email [email or web address] for any non-urgent concerns and when relaying weekly progress photos of flap sites.    Call your MD or seek immediate medical attention if you experience any of the following symptoms:  1. Fever of 101.5 (38.5 C) or greater  2. Pain not controlled with prescribed pain medications  3. Uncontrolled nausea and/or vomiting  4. Drainage or swelling around your incisions and/or surgical sites   5. Separation of incisions, or tearing of the incision line  6. Large fluid collection under or around the incision or flap sites   7. Flap discoloration (including darkened appearance)  8. Difficulty breathing  9. Swelling, pain, heat and/or redness in your legs and/or calves  10. Inability to tolerate diet/fluid intake    Follow-up/Post Discharge Appointments:  Follow-up care is a key part of your treatment and safety. It is very important that you  maintain follow-up care as directed so that your surgical site heals properly and does not lead to problems. Always carry a current medication list with you and bring it to ALL healthcare Provider visits. Be sure to maintain follow up with plastic surgery at your scheduled appointment. If you are unable to keep your appointment, or need to reschedule please contact our office at [phone].     12/28/23 @ 10:00AM [facility] 2nd Floor [address]

## 2023-12-12 NOTE — ANESTHESIA PREPROCEDURE EVALUATION
Patient: Lorraine Liu    Procedure Information       Date/Time: 12/12/23 0715    Procedures:       Release Contracture Torso (Right)      Removal Tissue Expander (Right)      Capsulotomy Breast (Right)      Reconstruction Breast with Deep Inferior Epigastric  Flap (Right)    Location: [facility] OR 10 / [facility] OR    Surgeons: Leigh Watkins MD            Relevant Problems   Anesthesia   (+) History of general anesthesia      Cardiovascular   (+) Hypertension       Clinical information reviewed:   Tobacco  Allergies  Meds   Med Hx  Surg Hx  OB Status  Fam Hx  Soc   Hx        NPO Detail:  NPO/Void Status  Carbonhydrate Drink Given Prior to Surgery? : N  Date of Last Liquid: 12/11/23  Time of Last Liquid: 0000  Date of Last Solid: 12/11/23  Time of Last Solid: 0000  Last Intake Type: Clear fluids  Time of Last Void: 0551         PHYSICAL EXAM    Anesthesia Plan    ASA 3     general     The patient is not a current smoker.  Patient was not previously instructed to abstain from smoking on day of procedure.  Patient did not smoke on day of procedure.  Education provided regarding risk of obstructive sleep apnea.  intravenous induction   Anesthetic plan and risks discussed with patient.  Use of blood products discussed with patient who.    Plan discussed with CRNA.

## 2023-12-12 NOTE — ANESTHESIA PROCEDURE NOTES
Airway  Date/Time: 12/12/2023 8:25 AM  Urgency: elective    Airway not difficult    Staffing  Performed: CRNA   Authorized by: Aaron Laurent MD    Performed by: DEEPAK Sanabria-MIGULE A  Patient location during procedure: OR    Indications and Patient Condition  Indications for airway management: anesthesia and airway protection  Spontaneous ventilation: present  Sedation level: deep  Preoxygenated: yes  Patient position: sniffing  MILS maintained throughout  Mask difficulty assessment: 1 - vent by mask  No planned trial extubation    Final Airway Details  Final airway type: endotracheal airway      Successful airway: ETT  Cuffed: yes   Successful intubation technique: direct laryngoscopy  Endotracheal tube insertion site: oral  Blade: Bailey  Blade size: #3  ETT size (mm): 7.5  Cormack-Lehane Classification: grade I - full view of glottis  Placement verified by: capnometry   Measured from: lips  ETT to lips (cm): 22  Number of attempts at approach: 1  Ventilation between attempts: BVM  Number of other approaches attempted: 0

## 2023-12-12 NOTE — BRIEF OP NOTE
Date: 2023  OR Location: [facility] OR    Name: Lorraine Liu, : 1970, Age: 53 y.o., MRN: [id number], Sex: female    Diagnosis  Pre-op Diagnosis     * Acquired absence of right breast and nipple [Z90.11] Post-op Diagnosis     * Acquired absence of right breast and nipple [Z90.11]     Procedures  Release Contracture Torso  20669 - MO PREP SITE TRUNK/ARM/LEG 1ST 100 SQ CM/1PCT    Removal Tissue Expander  52139 - MO REMOVAL TISSUE EXPANDER W/O INSERTION IMPLANT    Capsulotomy Breast  30261 - MO REVISION DEONTE-IMPLANT CAPSULE BREAST    Reconstruction Breast with Deep Inferior Epigastric  Flap  28410 - MO BREAST RECONSTRUCTION W/FREE FLAP      Surgeons      * Leigh Watkins - Primary     * Kimberli Medina    Resident/Fellow/Other Assistant:  Surgeon(s) and Role:     * DEEPAK Grover-CNP - Assisting     * Kimberli Medina PA-C    Procedure Summary  Anesthesia: General  ASA: III  Anesthesia Staff: Anesthesiologist: Tex Solares MD; Star Medina MD; Aaron Laurent MD; Jose Yates MD  CRNA: Azar Berman, APRN-CRNA; Wood Parry APRN-CRNA  C-AA: NATHALIA Berkowitz; NATHALIA Nicole; NATHALIA Duran  Estimated Blood Loss: 100 mL  Intra-op Medications:   Medication Name Total Dose   papaverine injection 60 mg   heparin (porcine) 5,000 Units in sodium chloride 0.9% 100 mL irrigation 5,000 Units   sodium chloride 0.9 % irrigation solution 1,000 mL   lisinopril 20 mg, hydroCHLOROthiazide 12.5 mg for Zestoretic/Prinizide Cannot be calculated              Anesthesia Record               Intraprocedure I/O Totals          Intake    Propofol Drip 0.00 mL    The total shown is the total volume documented since Anesthesia Start was filed.    lactated Ringer's infusion 1000.00 mL    Total Intake 1000 mL       Output    Urine 565 mL    Total Output 565 mL       Net    Net Volume 435 mL          Specimen:   ID Type Source Tests Collected by Time   1 : RIGHT BREAST CAPSULE AND RIGHT MASTECTOMY  SKIN Tissue BREAST MASTECTOMY RIGHT SURGICAL PATHOLOGY EXAM Leigh Watkins MD 12/12/2023 2715        Staff:   Circulator: Kevin Chni RN; Medardo Bellamy RN  Relief Circulator: Kevin Chin RN  Relief Scrub: [place] Garcia  Scrub Person: Arleen Ulrich; Kain Lal RN          Findings: Right breast saline filled tissue expander intact.    Complications:  None; patient tolerated the procedure well.None; patient tolerated the procedure well.     Disposition: PACU - hemodynamically stable.  Condition: stable  Specimens Collected:   ID Type Source Tests Collected by Time   1 : RIGHT BREAST CAPSULE AND RIGHT MASTECTOMY SKIN Tissue BREAST MASTECTOMY RIGHT SURGICAL PATHOLOGY EXAM Leigh Watkins MD 12/12/2023 0833     Attending Attestation: I was present and scrubbed for the entire procedure.    Leigh Watkins  Phone Number: [phone]

## 2023-12-13 LAB
ALBUMIN SERPL BCP-MCNC: 3.1 G/DL (ref 3.4–5)
ANION GAP SERPL CALC-SCNC: 16 MMOL/L (ref 10–20)
BUN SERPL-MCNC: 26 MG/DL (ref 6–23)
CALCIUM SERPL-MCNC: 7.4 MG/DL (ref 8.6–10.6)
CHLORIDE SERPL-SCNC: 110 MMOL/L (ref 98–107)
CO2 SERPL-SCNC: 19 MMOL/L (ref 21–32)
CREAT SERPL-MCNC: 1.06 MG/DL (ref 0.5–1.05)
ERYTHROCYTE [DISTWIDTH] IN BLOOD BY AUTOMATED COUNT: 12.8 % (ref 11.5–14.5)
GFR SERPL CREATININE-BSD FRML MDRD: 63 ML/MIN/1.73M*2
GLUCOSE SERPL-MCNC: 124 MG/DL (ref 74–99)
HCT VFR BLD AUTO: 31.3 % (ref 36–46)
HGB BLD-MCNC: 9.5 G/DL (ref 12–16)
MAGNESIUM SERPL-MCNC: 2.59 MG/DL (ref 1.6–2.4)
MCH RBC QN AUTO: 31.5 PG (ref 26–34)
MCHC RBC AUTO-ENTMCNC: 30.4 G/DL (ref 32–36)
MCV RBC AUTO: 104 FL (ref 80–100)
NRBC BLD-RTO: 0 /100 WBCS (ref 0–0)
PHOSPHATE SERPL-MCNC: 3.2 MG/DL (ref 2.5–4.9)
PLATELET # BLD AUTO: 232 X10*3/UL (ref 150–450)
POTASSIUM SERPL-SCNC: 4.5 MMOL/L (ref 3.5–5.3)
RBC # BLD AUTO: 3.02 X10*6/UL (ref 4–5.2)
SODIUM SERPL-SCNC: 140 MMOL/L (ref 136–145)
WBC # BLD AUTO: 13.4 X10*3/UL (ref 4.4–11.3)

## 2023-12-13 PROCEDURE — 99232 SBSQ HOSP IP/OBS MODERATE 35: CPT

## 2023-12-13 PROCEDURE — 83735 ASSAY OF MAGNESIUM: CPT | Performed by: PHYSICIAN ASSISTANT

## 2023-12-13 PROCEDURE — 99231 SBSQ HOSP IP/OBS SF/LOW 25: CPT | Performed by: STUDENT IN AN ORGANIZED HEALTH CARE EDUCATION/TRAINING PROGRAM

## 2023-12-13 PROCEDURE — 36415 COLL VENOUS BLD VENIPUNCTURE: CPT | Performed by: PHYSICIAN ASSISTANT

## 2023-12-13 PROCEDURE — 96372 THER/PROPH/DIAG INJ SC/IM: CPT | Performed by: NURSE PRACTITIONER

## 2023-12-13 PROCEDURE — 1170000001 HC PRIVATE ONCOLOGY ROOM DAILY

## 2023-12-13 PROCEDURE — 85027 COMPLETE CBC AUTOMATED: CPT | Performed by: PHYSICIAN ASSISTANT

## 2023-12-13 PROCEDURE — 1270000001 HC SEMI-PRIVATE ONCOLOGY ROOM DAILY

## 2023-12-13 PROCEDURE — 2500000004 HC RX 250 GENERAL PHARMACY W/ HCPCS (ALT 636 FOR OP/ED): Performed by: NURSE PRACTITIONER

## 2023-12-13 PROCEDURE — 2500000001 HC RX 250 WO HCPCS SELF ADMINISTERED DRUGS (ALT 637 FOR MEDICARE OP)

## 2023-12-13 PROCEDURE — 80069 RENAL FUNCTION PANEL: CPT | Performed by: PHYSICIAN ASSISTANT

## 2023-12-13 PROCEDURE — 2500000001 HC RX 250 WO HCPCS SELF ADMINISTERED DRUGS (ALT 637 FOR MEDICARE OP): Performed by: NURSE PRACTITIONER

## 2023-12-13 PROCEDURE — 2500000004 HC RX 250 GENERAL PHARMACY W/ HCPCS (ALT 636 FOR OP/ED): Performed by: PHYSICIAN ASSISTANT

## 2023-12-13 RX ORDER — KETOROLAC TROMETHAMINE 30 MG/ML
30 INJECTION, SOLUTION INTRAMUSCULAR; INTRAVENOUS EVERY 6 HOURS SCHEDULED
Status: DISCONTINUED | OUTPATIENT
Start: 2023-12-13 | End: 2023-12-13

## 2023-12-13 RX ADMIN — ACETAMINOPHEN 650 MG: 325 TABLET ORAL at 12:04

## 2023-12-13 RX ADMIN — ASPIRIN 81 MG: 81 TABLET, COATED ORAL at 15:06

## 2023-12-13 RX ADMIN — METHOCARBAMOL 1000 MG: 1000 INJECTION, SOLUTION INTRAMUSCULAR; INTRAVENOUS at 14:02

## 2023-12-13 RX ADMIN — DOCUSATE SODIUM 100 MG: 100 CAPSULE, LIQUID FILLED ORAL at 09:04

## 2023-12-13 RX ADMIN — ACETAMINOPHEN 650 MG: 325 TABLET ORAL at 07:03

## 2023-12-13 RX ADMIN — KETOROLAC TROMETHAMINE 30 MG: 30 INJECTION, SOLUTION INTRAMUSCULAR; INTRAVENOUS at 06:30

## 2023-12-13 RX ADMIN — Medication: at 09:04

## 2023-12-13 RX ADMIN — GABAPENTIN 600 MG: 300 CAPSULE ORAL at 07:03

## 2023-12-13 RX ADMIN — HEPARIN SODIUM 5000 UNITS: 5000 INJECTION INTRAVENOUS; SUBCUTANEOUS at 15:06

## 2023-12-13 RX ADMIN — HEPARIN SODIUM 5000 UNITS: 5000 INJECTION INTRAVENOUS; SUBCUTANEOUS at 07:04

## 2023-12-13 RX ADMIN — KETOROLAC TROMETHAMINE 30 MG: 30 INJECTION, SOLUTION INTRAMUSCULAR; INTRAVENOUS at 07:04

## 2023-12-13 RX ADMIN — SODIUM CHLORIDE 100 ML/HR: 9 INJECTION, SOLUTION INTRAVENOUS at 09:24

## 2023-12-13 RX ADMIN — METHOCARBAMOL 1000 MG: 1000 INJECTION, SOLUTION INTRAMUSCULAR; INTRAVENOUS at 07:05

## 2023-12-13 RX ADMIN — HEPARIN SODIUM 5000 UNITS: 5000 INJECTION INTRAVENOUS; SUBCUTANEOUS at 23:10

## 2023-12-13 RX ADMIN — METHOCARBAMOL 1000 MG: 1000 INJECTION, SOLUTION INTRAMUSCULAR; INTRAVENOUS at 22:00

## 2023-12-13 RX ADMIN — SODIUM CHLORIDE 100 ML/HR: 9 INJECTION, SOLUTION INTRAVENOUS at 18:05

## 2023-12-13 RX ADMIN — TRAMADOL HYDROCHLORIDE 50 MG: 50 TABLET, COATED ORAL at 15:08

## 2023-12-13 RX ADMIN — KETOROLAC TROMETHAMINE 30 MG: 30 INJECTION, SOLUTION INTRAMUSCULAR; INTRAVENOUS at 00:14

## 2023-12-13 RX ADMIN — ACETAMINOPHEN 650 MG: 325 TABLET ORAL at 23:10

## 2023-12-13 RX ADMIN — DOCUSATE SODIUM 100 MG: 100 CAPSULE, LIQUID FILLED ORAL at 22:00

## 2023-12-13 RX ADMIN — ACETAMINOPHEN 650 MG: 325 TABLET ORAL at 18:01

## 2023-12-13 RX ADMIN — CEFAZOLIN SODIUM 2 G: 2 INJECTION, SOLUTION INTRAVENOUS at 01:24

## 2023-12-13 RX ADMIN — GABAPENTIN 600 MG: 300 CAPSULE ORAL at 18:02

## 2023-12-13 SDOH — SOCIAL STABILITY: SOCIAL INSECURITY: ARE YOU OR HAVE YOU BEEN THREATENED OR ABUSED PHYSICALLY, EMOTIONALLY, OR SEXUALLY BY ANYONE?: NO

## 2023-12-13 SDOH — SOCIAL STABILITY: SOCIAL INSECURITY: DO YOU FEEL UNSAFE GOING BACK TO THE PLACE WHERE YOU ARE LIVING?: NO

## 2023-12-13 SDOH — SOCIAL STABILITY: SOCIAL INSECURITY: HAS ANYONE EVER THREATENED TO HURT YOUR FAMILY OR YOUR PETS?: NO

## 2023-12-13 SDOH — HEALTH STABILITY: MENTAL HEALTH: EXPERIENCED ANY OF THE FOLLOWING LIFE EVENTS: OTHER (COMMENT)

## 2023-12-13 SDOH — SOCIAL STABILITY: SOCIAL INSECURITY: WERE YOU ABLE TO COMPLETE ALL THE BEHAVIORAL HEALTH SCREENINGS?: YES

## 2023-12-13 SDOH — SOCIAL STABILITY: SOCIAL INSECURITY: DO YOU FEEL ANYONE HAS EXPLOITED OR TAKEN ADVANTAGE OF YOU FINANCIALLY OR OF YOUR PERSONAL PROPERTY?: NO

## 2023-12-13 SDOH — SOCIAL STABILITY: SOCIAL INSECURITY: ARE THERE ANY APPARENT SIGNS OF INJURIES/BEHAVIORS THAT COULD BE RELATED TO ABUSE/NEGLECT?: NO

## 2023-12-13 SDOH — SOCIAL STABILITY: SOCIAL INSECURITY: ABUSE: ADULT

## 2023-12-13 SDOH — SOCIAL STABILITY: SOCIAL INSECURITY: DOES ANYONE TRY TO KEEP YOU FROM HAVING/CONTACTING OTHER FRIENDS OR DOING THINGS OUTSIDE YOUR HOME?: NO

## 2023-12-13 SDOH — SOCIAL STABILITY: SOCIAL INSECURITY: HAVE YOU HAD THOUGHTS OF HARMING ANYONE ELSE?: NO

## 2023-12-13 SDOH — SOCIAL STABILITY: SOCIAL INSECURITY: POSSIBLE ABUSE REPORTED TO:: OTHER (COMMENT)

## 2023-12-13 ASSESSMENT — LIFESTYLE VARIABLES
SKIP TO QUESTIONS 9-10: 1
SUBSTANCE_ABUSE_PAST_12_MONTHS: NO
AUDIT-C TOTAL SCORE: 0
PRESCIPTION_ABUSE_PAST_12_MONTHS: NO
HOW OFTEN DO YOU HAVE A DRINK CONTAINING ALCOHOL: NEVER
HOW OFTEN DO YOU HAVE 6 OR MORE DRINKS ON ONE OCCASION: NEVER
HOW MANY STANDARD DRINKS CONTAINING ALCOHOL DO YOU HAVE ON A TYPICAL DAY: PATIENT DOES NOT DRINK
AUDIT-C TOTAL SCORE: 0

## 2023-12-13 ASSESSMENT — COGNITIVE AND FUNCTIONAL STATUS - GENERAL
WALKING IN HOSPITAL ROOM: TOTAL
CLIMB 3 TO 5 STEPS WITH RAILING: TOTAL
TOILETING: TOTAL
EATING MEALS: A LITTLE
MOVING FROM LYING ON BACK TO SITTING ON SIDE OF FLAT BED WITH BEDRAILS: TOTAL
DRESSING REGULAR LOWER BODY CLOTHING: TOTAL
PATIENT BASELINE BEDBOUND: NO
MOVING TO AND FROM BED TO CHAIR: TOTAL
DRESSING REGULAR UPPER BODY CLOTHING: TOTAL
MOBILITY SCORE: 6
TURNING FROM BACK TO SIDE WHILE IN FLAT BAD: TOTAL
DAILY ACTIVITIY SCORE: 8
PERSONAL GROOMING: TOTAL
HELP NEEDED FOR BATHING: TOTAL
STANDING UP FROM CHAIR USING ARMS: TOTAL

## 2023-12-13 ASSESSMENT — ACTIVITIES OF DAILY LIVING (ADL)
HEARING - RIGHT EAR: FUNCTIONAL
WALKS IN HOME: INDEPENDENT
BATHING: NEEDS ASSISTANCE
DRESSING YOURSELF: NEEDS ASSISTANCE
JUDGMENT_ADEQUATE_SAFELY_COMPLETE_DAILY_ACTIVITIES: YES
GROOMING: NEEDS ASSISTANCE
HEARING - LEFT EAR: FUNCTIONAL
ADEQUATE_TO_COMPLETE_ADL: YES
LACK_OF_TRANSPORTATION: NO
TOILETING: NEEDS ASSISTANCE
FEEDING YOURSELF: INDEPENDENT
PATIENT'S MEMORY ADEQUATE TO SAFELY COMPLETE DAILY ACTIVITIES?: YES

## 2023-12-13 ASSESSMENT — PAIN SCALES - GENERAL
PAINLEVEL_OUTOF10: 5 - MODERATE PAIN
PAINLEVEL_OUTOF10: 3
PAINLEVEL_OUTOF10: 5 - MODERATE PAIN
PAINLEVEL_OUTOF10: 3
PAINLEVEL_OUTOF10: 4
PAINLEVEL_OUTOF10: 3
PAINLEVEL_OUTOF10: 4

## 2023-12-13 ASSESSMENT — PATIENT HEALTH QUESTIONNAIRE - PHQ9
SUM OF ALL RESPONSES TO PHQ9 QUESTIONS 1 & 2: 0
2. FEELING DOWN, DEPRESSED OR HOPELESS: NOT AT ALL
1. LITTLE INTEREST OR PLEASURE IN DOING THINGS: NOT AT ALL

## 2023-12-13 ASSESSMENT — PAIN DESCRIPTION - LOCATION
LOCATION: ABDOMEN
LOCATION: ABDOMEN

## 2023-12-13 ASSESSMENT — PAIN - FUNCTIONAL ASSESSMENT
PAIN_FUNCTIONAL_ASSESSMENT: 0-10
PAIN_FUNCTIONAL_ASSESSMENT: 0-10

## 2023-12-13 ASSESSMENT — PAIN DESCRIPTION - ORIENTATION
ORIENTATION: MID
ORIENTATION: MID

## 2023-12-13 NOTE — PROGRESS NOTES
12/13/23 1038   Discharge Planning   Living Arrangements Spouse/significant other   Support Systems Spouse/significant other   Assistance Needed Patient was independent, does not use assistive devices.   Type of Residence Private residence   Number of Stairs Within Residence 12   Do you have animals or pets at home? Yes   Type of Animals or Pets 1 dog   Who is requesting discharge planning? Provider   Home or Post Acute Services Other (Comment)   Patient expects to be discharged to: Home needs  TBD   Does the patient need discharge transport arranged? No   Patient Choice   Provider Choice list and CMS website (https://medicare.gov/care-compare#search) for post-acute Quality and Resource Measure Data were provided and reviewed with: Patient     Patient lives with her  in a house. Bed and bath are on the upper level. Patient states she can stay downstairs does have a 1/2 bath.  is primary teachable/caregiver. [facility] is AOC if needed. Patient is independent, does not use assistive devices. Denies falls and feels safe at home. Patient does have a dog at home that she is missing.  I offered [employer] visit. Patient very receptive to visits, states she  would really enjoy that. Left message with [employer] for pet visits. Demographics and contacts verified. Will continue to assist with discharge needs.    TCC Note    Plan per Medical/Surgical Team: POD#0 for remaoval of right TE, right breast EMI. Monitoring flap q2 hrs, strict bedrest, DEBBIE drain care, pain management  Status: Inpatient  Payor Source: [place]  Discharge disposition: Home needs TBD  Expected date of discharge: 5-7 days  Barriers: None  Primary Oncologist:  Preferred home care agency: [facility]  Will continue to follow patient for any discharge needs.

## 2023-12-13 NOTE — PROGRESS NOTES
"Plastic Surgery Progress Note    Patient Name: Lorraine Liu  MRN: [id number]  Date:  12/13/23     Subjective  Resting comfortably in bed, pain well controlled. Denies any fever, chills, night sweats, CP, SOB, palpitations, nausea, vomiting, diarrhea, constipation, dysuria, hematuria, hematochezia, hematemesis, flank pain.        Objective    Vital Signs  /76 (BP Location: Right leg, Patient Position: Lying)   Pulse 89   Temp 36.6 °C (97.9 °F) (Temporal)   Resp 18   Ht 1.651 m (5' 5\")   Wt 93.3 kg (205 lb 11 oz)   LMP  (LMP Unknown) Comment: urine pregnancy negative  SpO2 92%   BMI 34.23 kg/m²      Physical Exam  Constitutional: A&Ox3, calm and cooperative, NAD.  Eyes: PERRL, clear sclera .  ENMT: Moist mucous membranes, no apparent injuries or lesions.  Head/Neck: Neck supple, no JVD. NC/AT.   Cardiovascular: Normal rate and regular rhythm. 2+ equal pulses of the distal extremities.  Respiratory/Thorax: CTAB, regular respirations on RA. Good symmetric chest expansion.   Gastrointestinal: Abdomen soft, appropriately tender, no peritoneal signs. +BS x 4, Incisional wound vac to pfannenstiel incision without alarms for leak or malfunction. Holding appropriate suction at -125mmHg. Tissue surrounding vac dressing without erythema or edema. Aquacel AG covering umbilicus, c/d/I, pain block in place   Genitourinary: Voiding via indwelling de anda catheter. Clear, yellow output  Extremities: No peripheral edema. Moving all 4 extremities actively.   Neurological: A&Ox3.   Psychological: Appropriate mood and behavior.   Breast: EMI free flap recipient site to right breast which is appropriate color, without pallor. Warm to touch, soft. Flap approximated intact sutures and incision lines dressed with Xeroform dressing, no evidence of bleeding or dehiscence. Mild puckering noted to superior medial aspect of incision. Intact venous Doppler pulse at lateral and medial aspects of breast flap tissue. Appropriately " tender to palpation at flap and surrounding tissue. 2+ Cap refill at the flap. No areas of surrounding edema, fluid collection, induration, drainage or bleeding. x1 DEBBIE drain to lateral breast with bloody serosanguinous output.    Diagnostics   Results for orders placed or performed during the hospital encounter of 12/12/23 (from the past 24 hour(s))   CBC   Result Value Ref Range    WBC 21.5 (H) 4.4 - 11.3 x10*3/uL    nRBC 0.0 0.0 - 0.0 /100 WBCs    RBC 3.58 (L) 4.00 - 5.20 x10*6/uL    Hemoglobin 10.9 (L) 12.0 - 16.0 g/dL    Hematocrit 33.5 (L) 36.0 - 46.0 %    MCV 94 80 - 100 fL    MCH 30.4 26.0 - 34.0 pg    MCHC 32.5 32.0 - 36.0 g/dL    RDW 12.2 11.5 - 14.5 %    Platelets 292 150 - 450 x10*3/uL   Renal function panel   Result Value Ref Range    Glucose 184 (H) 74 - 99 mg/dL    Sodium 140 136 - 145 mmol/L    Potassium 5.1 3.5 - 5.3 mmol/L    Chloride 108 (H) 98 - 107 mmol/L    Bicarbonate 19 (L) 21 - 32 mmol/L    Anion Gap 18 10 - 20 mmol/L    Urea Nitrogen 21 6 - 23 mg/dL    Creatinine 0.97 0.50 - 1.05 mg/dL    eGFR 70 >60 mL/min/1.73m*2    Calcium 8.2 (L) 8.6 - 10.6 mg/dL    Phosphorus 4.3 2.5 - 4.9 mg/dL    Albumin 3.7 3.4 - 5.0 g/dL   Magnesium   Result Value Ref Range    Magnesium 2.43 (H) 1.60 - 2.40 mg/dL   Magnesium   Result Value Ref Range    Magnesium 2.59 (H) 1.60 - 2.40 mg/dL   CBC   Result Value Ref Range    WBC 13.4 (H) 4.4 - 11.3 x10*3/uL    nRBC 0.0 0.0 - 0.0 /100 WBCs    RBC 3.02 (L) 4.00 - 5.20 x10*6/uL    Hemoglobin 9.5 (L) 12.0 - 16.0 g/dL    Hematocrit 31.3 (L) 36.0 - 46.0 %     (H) 80 - 100 fL    MCH 31.5 26.0 - 34.0 pg    MCHC 30.4 (L) 32.0 - 36.0 g/dL    RDW 12.8 11.5 - 14.5 %    Platelets 232 150 - 450 x10*3/uL   Renal Function Panel   Result Value Ref Range    Glucose 124 (H) 74 - 99 mg/dL    Sodium 140 136 - 145 mmol/L    Potassium 4.5 3.5 - 5.3 mmol/L    Chloride 110 (H) 98 - 107 mmol/L    Bicarbonate 19 (L) 21 - 32 mmol/L    Anion Gap 16 10 - 20 mmol/L    Urea Nitrogen 26 (H) 6 -  23 mg/dL    Creatinine 1.06 (H) 0.50 - 1.05 mg/dL    eGFR 63 >60 mL/min/1.73m*2    Calcium 7.4 (L) 8.6 - 10.6 mg/dL    Phosphorus 3.2 2.5 - 4.9 mg/dL    Albumin 3.1 (L) 3.4 - 5.0 g/dL     ECG 12 Lead    Result Date: 11/29/2023  Normal sinus rhythm Normal ECG When compared with ECG of 04-MAR-2021 10:43, No significant change was found Confirmed by Adam Lau ([id number]) on 11/29/2023 11:54:26 AM      Current Medications  Scheduled medications  acetaminophen, 650 mg, oral, q6h VISH  aspirin, 81 mg, oral, Daily  docusate sodium, 100 mg, oral, BID  gabapentin, 600 mg, oral, q12h  heparin (porcine), 5,000 Units, subcutaneous, q8h  [Held by provider] lisinopril 20 mg, hydroCHLOROthiazide 12.5 mg for Zestoretic/Prinizide, , oral, Daily  methocarbamol, 1,000 mg, intravenous, q8h  scopolamine, 1 patch, transdermal, q72h      Continuous medications  ropivacaine (PF) in NS cmpd, 20 mL/hr  sodium chloride 0.9%, 100 mL/hr, Last Rate: 100 mL/hr (12/13/23 1012)      PRN medications  PRN medications: diphenhydrAMINE, HYDROmorphone, magnesium hydroxide, naloxone, ondansetron **OR** ondansetron, traMADol, traMADol     Assessment/Plan  Lorraine Liu is a 53 y.o. female with a past medical history of meningioma and R breast cancer s/p mastectomy with immediate reconstruction via tissue expander placement on 3/19/2021. She presented for further R breast reconstruction via EMI free flap on 12/12 with Dr. Watkins of Plastic Surgery.     # S/p Right Breast EMI:   - Continue serial flap assessments Q1hour per nursing and Q2hour per plastic surgery team       ·  Assess Doppler pulse at marked site plus flap appearance (color, warmth, turgor)       ·  Nursing to notify plastic surgery team immediately if there are any acute changes          (Including decreased Doppler signal, pallor, temperature change, bleeding, etc.)  - Continue use of [place] hugger to right breast       ·  Settings: low heat, medium continuous fan        ·  5 hours on, one  hour off x 3 days post op   - Daily local wound care/dressing changes per plastic surgery APPs (Xeroform over incision lines)       ·  Monitor surgical sites for S/S of bleeding, infection or dehiscence   - Continue DEBBIE drain care per nursing       ·  Strip drain tubing TID and PRN       ·  Monitor and record output [id number]        ·  Keep drain sites C/D/I with daily drain dressing changes        ·  Call plastics if drain output is >30cc in an hour or greater than 200cc over 8 hours  - Continue incisional wound vac therapy to abdomen x10-14 days post op        ·  Settings: 125 mmHg low continuous suction        ·  Please keep dressing C/D/I, reinforce PRN        ·  Record vac output per nursing q8h       ·  Please alert plastics team with any concerns regarding vac        ·  Plan to transition to Prevena homegoing vac on day of discharge    - Maintain beach chair positioning        ·  Avoid positioning that would apply pressure to flap region or incisions   - Patient to maintain strict bedrest until POD# 2-3   - Maintain [place] while on bedrest with anticipated removal on POD#1-2   - Resume PO nutrition   - Maintain BP of 110/60 minimum to ensure adequate flap perfusion   - Low transfusion threshold to ensure adequate flap perfusion  - SQ Heparin [id number] resumed postoperatively, transition to Lovenox on POD#3 (12/15)   - Daily ASA started POD #1 and continue x30 days post op (end date 1/12)   - Monitor VS/pulse ox [id number]   - Monitor AM CBC/RFP/Mg     # Acute postoperative pain  - Well controlled per patient, continue current regimen       ·  Dilaudid IV, wean as tolerated       ·  Scheduled Tylenol 650mg PO [id number]        · Gabapentin 600mg PO BID        · Robaxin 1000mg IV [id number]       · Scopolamine patch [id number]  - IV Toradol held POD#1 due to slightly elevated sCre, consider restarting with normalization of kidney function   - Bowel regimen with Colace 100 mg PO BID while using narcotics  - IV Zofran PRN nausea  due to narcotics   - Pain assessments [id number]     # Anemia   - Incoming HGB on admission at 12.2  - HGB on AM labs at 9.5   - Presently no S/S of bleeding, considered likely due to intraoperative blood loss   - Keep T&S UTD, last obtained 11/28/23  - Monitor for S/S of bleeding or symptomatic anemia   - Low transfusion threshold to ensure adequate flap perfusion  - Transfuse for HGB <7 per protocol   - Monitor AM CBC     # Hx HTN   - BP currently stable 129/76  - Resume home BP meds (hold Lisinopril/hydrochlorothiazide when able)   - Maintain BP of 110/60 minimum to ensure adequate flap perfusion        ·  Please avoid use of pressors as able               -> Recommended use of dobutamine and norepinephrine as indicated only        ·  For HTN, please avoid hydralazine if possible               -> Prefer use of calcium channel blockers as indicated for HTN (amlodipine)      Prophylaxis:   - DVT: SQ heparin [id number] (plan to transition to SQ Lovenox on POD#3), ASA 81 once daily, SCDs  - Encourage IS x10 every hour while awake   - Bowel regimen: Colace 100mg BID        Disposition: Continue care on RNF. Will remain inpatient for continued flap monitoring and vac/drain surveillance postoperatively. Follow up appointment with plastic surgery department to be scheduled closer to anticipated date of discharge.     Patient and plan discussed with MARGI CaballeroC  Plastic and Reconstructive Surgery   [place]  Pager #[phone]  Team phones: [phone]

## 2023-12-13 NOTE — PROGRESS NOTES
Pharmacy Medication History Review    Lorraine Liu is a 53 y.o. female admitted for Acquired absence of breast and absent nipple, right. Pharmacy reviewed the patient's [id number] medications and allergies for accuracy.    The list below reflects the updated PTA list. Comments regarding how patient may be taking medications differently can be found in the Admit Orders Activity  Medications Prior to Admission   Medication Sig Dispense Refill Last Dose    lisinopriL-hydrochlorothiazide 20-12.5 mg tablet Take 2 tablets by mouth once daily 60 tablet 0 2023    tamoxifen (Nolvadex) 20 mg tablet TAKE ONE (1) TABLET BY MOUTH ONCE A DAY 90 tablet 1 2023    [] chlorhexidine (Peridex) 0.12 % solution Use 15 mL in the mouth or throat 2 times a day for 14 days. Use the night before and morning of surgery. (Patient not taking: Reported on 2023) 473 mL 0 Not Taking        The list below reflects the updated allergy list. Please review each documented allergy for additional clarification and justification.  Allergies  Reviewed by DEEPAK Sanabria-CRNA on 2023   No Known Allergies         Patient accepts M2B at discharge. Pharmacy has been updated to [facility].    Sources used to complete the med history include out patient fill history, OARRS, and patient interview - reliable historian    Below are additional concerns with the patient's PTA list.  N/A    Robert Barriga PharmD  Transitions of Care Pharmacist  [facility]  Please reach out via Secure Chat for questions, or if no response call Beam. or [facility]

## 2023-12-13 NOTE — CARE PLAN
The patient's goals for the shift include      The clinical goals for the shift include  pain control, use of bear hugger and hourly flap checks.    Received pt into room #6011 via hosp bed from PACU. VSS AOX4. Flap checks to right chest q1 hour. Bear hugger on 5 hours and off 1 hour. Lower abd at donor site has wound vac and 125 mm/hg.  DEBBIE at breast patent. Horvath patent with good urine output. Bilat Q pumps. IVF at 100. C/O some nausea. Vomited 100 ml of green bile. Given zofran and Scopolamine patch applied behind left ear.  Resting in between care.

## 2023-12-13 NOTE — PROGRESS NOTES
"Lorraine Liu is a 53 y.o. female  with PMH of HTN, Right breast CA, melanoma, meningioma, Acquired absence of breast and absent nipple, right who is now POD#0 for removal of right TE, right breast EMI with Dr. Watkins.     Subjective   Resting comfortably in PACU, pain well controlled, NPO, Denies any fever, chills, night sweats, CP, SOB, palpitations, nausea, vomiting, diarrhea, constipation, dysuria, hematuria, hematochezia, hematemesis, flank pain.        Objective     Physical Exam  Constitutional: A&Ox3, calm and cooperative, NAD.  Eyes: PERRL, EOMI  ENMT: Moist mucous membranes, no apparent injuries or lesions.  Head/Neck: NC/AT.   Cardiovascular: Normal rate and regular rhythm. 2+ equal pulses of the distal extremities.  Respiratory/Thorax: CTAB, regular respirations on RA. Good symmetric chest expansion.   Gastrointestinal: Abdomen soft, appropriately tender, no peritoneal signs, +BS x 4, Incisional wound vac to pfannenstiel incision without alarms for leak or malfunction. Holding appropriate suction at -125mmHg. Tissue surrounding vac dressing without erythema or edema. Aquacel AG covering umbilicus, c/d/I, pain block in place  Breast: Right breast flap viable, cool to touch, doppler signal strong at marked site, cap refill < 3 sec, soft and compressible, incision covered circumferentially with xeroform, DEBBIE drain to right breast with ss output  Genitourinary: indwelling de anda in place with clear, yellow output  Extremities: No peripheral edema. Moving all 4 extremities actively.   Neurological: A&Ox3.   Psychological: Appropriate mood and behavior.      Last Recorded Vitals  Blood pressure 128/63, pulse 94, temperature 36.7 °C (98.1 °F), resp. rate 17, height 1.651 m (5' 5\"), weight 93.3 kg (205 lb 11 oz), SpO2 93 %.  Intake/Output last 3 Shifts:  I/O last 3 completed shifts:  In: 1000 (10.7 mL/kg) [I.V.:1000 (10.7 mL/kg)]  Out: 565 (6.1 mL/kg) [Urine:565 (0.2 mL/kg/hr)]  Weight: 93.3 kg     Relevant " Results  Scheduled medications  acetaminophen, 650 mg, oral, q6h VISH  [START ON 12/13/2023] aspirin, 81 mg, oral, Daily  ceFAZolin, 2 g, intravenous, q8h  docusate sodium, 100 mg, oral, BID  gabapentin, 600 mg, oral, q12h  heparin (porcine), 5,000 Units, subcutaneous, q8h  ketorolac, 30 mg, intravenous, q6h VISH  lidocaine, 0.1 mL, subcutaneous, Once  [Held by provider] lisinopril 20 mg, hydroCHLOROthiazide 12.5 mg for Zestoretic/Prinizide, , oral, Daily  methocarbamol, 1,000 mg, intravenous, q8h      Continuous medications  lactated Ringer's, 100 mL/hr, Last Rate: 100 mL/hr (12/12/23 2000)  ropivacaine (PF) in NS cmpd, 20 mL/hr  sodium chloride 0.9%, 100 mL/hr      PRN medications  PRN medications: acetaminophen, diphenhydrAMINE, droperidol, HYDROmorphone, HYDROmorphone, HYDROmorphone, magnesium hydroxide, meperidine, naloxone, ondansetron **OR** ondansetron, oxygen, traMADol, traMADol     Results for orders placed or performed during the hospital encounter of 12/12/23 (from the past 24 hour(s))   CBC   Result Value Ref Range    WBC 21.5 (H) 4.4 - 11.3 x10*3/uL    nRBC 0.0 0.0 - 0.0 /100 WBCs    RBC 3.58 (L) 4.00 - 5.20 x10*6/uL    Hemoglobin 10.9 (L) 12.0 - 16.0 g/dL    Hematocrit 33.5 (L) 36.0 - 46.0 %    MCV 94 80 - 100 fL    MCH 30.4 26.0 - 34.0 pg    MCHC 32.5 32.0 - 36.0 g/dL    RDW 12.2 11.5 - 14.5 %    Platelets 292 150 - 450 x10*3/uL       ECG 12 Lead    Result Date: 11/29/2023  Normal sinus rhythm Normal ECG When compared with ECG of 04-MAR-2021 10:43, No significant change was found Confirmed by Adam Lau ([id number]) on 11/29/2023 11:54:26 AM      Assessment/Plan   Principal Problem:    Acquired absence of breast and absent nipple, right  Active Problems:    History of general anesthesia    S/p Right breast EMI:  A/P:  - doing well post operatively in PACU, waiting to be transferred to the floor  - NPO until reassessed by Plastics tomorrow  - Breast Doppler/Flap check Q1 hour per nursing, plastics doppler  Q2 x 24-48 hrs  - strict bedrest  - drain care- monitor DEBBIE output and strip drain Q4  - IV Ancef x 3 doses post operatively  - as needed antiemetic  - prn pain management  - maintain Paravertebral and Erector spinae block per anesthesia/pain management  - Beach chair position, hips flexed  - maintain prevena incisional wound vac at -125 mmHg, notify plastics with any alarms or leaks  - avoid pressure to flap site  - VSQ4  - NS @ 100 cc/hr  - monitor labs  - maintain [place] hugger to right breast on for 5 hours and off for 1 hour  - Resume home Tamoxifen when able  - Dressing to right breast flap covered with xeroform daily per plastics  - Horvath to GD- to remain in place post op while on strict bedrest  - Encourage coughing and deep breathing, IS  - DVT ppx: Heparin SQ Q8 hrs post op, start ASA 81 mg daily tomorrow, on POD#3 transition to Lovenox  - Notify plastics immediately with any concerns for change in color, decreased doppler signal to flap    HTN:  - BP currently stable (/80)   - Resume Home BP meds (hold Lisinopril/hydrochlorothiazide when able)   - Maintain BP of 110/60 minimum to ensure adequate flap perfusion           · Please avoid use of pressors as able                -> Recommended use of dobutamine and norepinephrine as indicated only           · For HTN, please avoid hydralazine if possible                -> Prefer use of calcium channel blockers as indicated for HTN (amlodipine)      Dispo planning: Will remain inpatient for 5-7 days on RNF for flap monitoring, strict bedrest, pain management.  Discussed plan and updated Dr. Watkins.     Time spent on the assessment of patient, gathering and interpreting data, review of medical record/patient history, personally reviewing radiographic imaging and formulation of this note 60 minutes. With greater than 50% spent in personal discussion with patient.     DEEPAK Marlow-CNP  Plastic and Reconstructive Surgery  [facility]  [id number] or [id number]

## 2023-12-13 NOTE — CARE PLAN
Problem: Skin  Goal: Participates in plan/prevention/treatment measures  Outcome: Progressing     Problem: Psychosocial Needs  Goal: Collaborate with me, my family, and caregiver to identify my specific goals  Recent Flowsheet Documentation  Taken 12/13/2023 1415 by Kendra Gay RN  Cultural Requests During Hospitalization: none  Spiritual Requests During Hospitalization: none   The patient's goals for the shift include      The clinical goals for the shift include Patient will be kept comfortablle this shift    Patient aox4. Patient doppler were done hourly. Pain was kept a a comfortable level. Bear hugger was on for 5 hours off for 1. Kept alternating. Horvath has clear yellow urine

## 2023-12-13 NOTE — PROGRESS NOTES
12/13/23 1038   Discharge Planning   Living Arrangements Spouse/significant other   Support Systems Spouse/significant other   Assistance Needed Patient was independent, does not use assistive devices.   Type of Residence Private residence   Number of Stairs Within Residence 12   Do you have animals or pets at home? Yes   Type of Animals or Pets 1 dog   Who is requesting discharge planning? Provider   Home or Post Acute Services Other (Comment)   Patient expects to be discharged to: Home needs  TBD   Does the patient need discharge transport arranged? No   Patient Choice   Provider Choice list and CMS website (https://medicare.gov/care-compare#search) for post-acute Quality and Resource Measure Data were provided and reviewed with: Patient     Patient lives with her  in a house. Bed and bath are on the upper level. Patient states she can stay downstairs does have a 1/2 bath.  is primary teachable/caregiver. HC is AOC if needed. Patient is independent, does not use assistive devices. Denies falls and feels safe at home. Patient does have a dog at home that she is missing.  I offered [employer] visit. Patient very receptive to visits, states she  would really enjoy that. Left message with [employer] for pet visits. Demographics and contacts verified. Will continue to assist with discharge needs.

## 2023-12-13 NOTE — PROGRESS NOTES
Acute Pain Service  Postop Pain HPI -   Palliative: relieved with IV analgesics and regional local anesthetics  Provocative: movement  Quality:  burning and aching  Radiation:  none  Severity:  3-4/10  Timing: constant    24-HOUR OPIOID CONSUMPTION:  2.5 mg dilaudid    Scheduled medications  acetaminophen, 650 mg, oral, q6h VISH  aspirin, 81 mg, oral, Daily  docusate sodium, 100 mg, oral, BID  gabapentin, 600 mg, oral, q12h  heparin (porcine), 5,000 Units, subcutaneous, q8h  ketorolac, 30 mg, intravenous, q6h VISH  ketorolac, 30 mg, intravenous, q6h VISH  [Held by provider] lisinopril 20 mg, hydroCHLOROthiazide 12.5 mg for Zestoretic/Prinizide, , oral, Daily  methocarbamol, 1,000 mg, intravenous, q8h  scopolamine, 1 patch, transdermal, q72h  surgical lubricant, , ,       Continuous medications  ropivacaine (PF) in NS cmpd, 20 mL/hr  sodium chloride 0.9%, 100 mL/hr, Last Rate: 100 mL/hr (12/12/23 2200)      PRN medications  PRN medications: diphenhydrAMINE, HYDROmorphone, magnesium hydroxide, naloxone, ondansetron **OR** ondansetron, surgical lubricant, traMADol, traMADol     Physical Exam:  Constitutional:  no distress, alert and cooperative  Eyes: clear sclera  Head/Neck: No apparent injury, trachea midline  Respiratory/Thorax: Patent airways, thorax symmetric, breathing comfortably  Cardiovascular: no pitting edema  Gastrointestinal: Nondistended  Musculoskeletal: ROM intact  Extremities: no clubbing  Neurological: alert, sánchez x4  Psychological: Appropriate affect    Results for orders placed or performed during the hospital encounter of 12/12/23 (from the past 24 hour(s))   CBC   Result Value Ref Range    WBC 21.5 (H) 4.4 - 11.3 x10*3/uL    nRBC 0.0 0.0 - 0.0 /100 WBCs    RBC 3.58 (L) 4.00 - 5.20 x10*6/uL    Hemoglobin 10.9 (L) 12.0 - 16.0 g/dL    Hematocrit 33.5 (L) 36.0 - 46.0 %    MCV 94 80 - 100 fL    MCH 30.4 26.0 - 34.0 pg    MCHC 32.5 32.0 - 36.0 g/dL    RDW 12.2 11.5 - 14.5 %    Platelets 292 150 - 450  x10*3/uL   Renal function panel   Result Value Ref Range    Glucose 184 (H) 74 - 99 mg/dL    Sodium 140 136 - 145 mmol/L    Potassium 5.1 3.5 - 5.3 mmol/L    Chloride 108 (H) 98 - 107 mmol/L    Bicarbonate 19 (L) 21 - 32 mmol/L    Anion Gap 18 10 - 20 mmol/L    Urea Nitrogen 21 6 - 23 mg/dL    Creatinine 0.97 0.50 - 1.05 mg/dL    eGFR 70 >60 mL/min/1.73m*2    Calcium 8.2 (L) 8.6 - 10.6 mg/dL    Phosphorus 4.3 2.5 - 4.9 mg/dL    Albumin 3.7 3.4 - 5.0 g/dL   Magnesium   Result Value Ref Range    Magnesium 2.43 (H) 1.60 - 2.40 mg/dL          PLAN  - erector spinae plane nerve blocks with catheters performed preoperatively 12/12   - Ambit ball with Ropivacaine 0.2%/NaCl 0.9% 500mL, Rate 7 cc/hr bilaterally  - Ambit medication will not interfere with pain medication prescribed by the primary team.   - Please be aware of local anesthetic toxic dose and absorption variability before considering lidocaine patches  - Acute pain service will follow while catheters in place  -Rest of pain medications per primary service  -Acute pain service will continue to follow    Acute Pain Service Resident  pg [phone] ph [phone]

## 2023-12-14 LAB
ABO GROUP (TYPE) IN BLOOD: NORMAL
ALBUMIN SERPL BCP-MCNC: 3.1 G/DL (ref 3.4–5)
ANION GAP SERPL CALC-SCNC: 11 MMOL/L (ref 10–20)
ANTIBODY SCREEN: NORMAL
BUN SERPL-MCNC: 14 MG/DL (ref 6–23)
CALCIUM SERPL-MCNC: 7.5 MG/DL (ref 8.6–10.6)
CHLORIDE SERPL-SCNC: 110 MMOL/L (ref 98–107)
CO2 SERPL-SCNC: 21 MMOL/L (ref 21–32)
CREAT SERPL-MCNC: 0.87 MG/DL (ref 0.5–1.05)
ERYTHROCYTE [DISTWIDTH] IN BLOOD BY AUTOMATED COUNT: 12.8 % (ref 11.5–14.5)
GFR SERPL CREATININE-BSD FRML MDRD: 79 ML/MIN/1.73M*2
GLUCOSE SERPL-MCNC: 143 MG/DL (ref 74–99)
HCT VFR BLD AUTO: 28.2 % (ref 36–46)
HGB BLD-MCNC: 8.7 G/DL (ref 12–16)
MAGNESIUM SERPL-MCNC: 2.34 MG/DL (ref 1.6–2.4)
MCH RBC QN AUTO: 30.6 PG (ref 26–34)
MCHC RBC AUTO-ENTMCNC: 30.9 G/DL (ref 32–36)
MCV RBC AUTO: 99 FL (ref 80–100)
NRBC BLD-RTO: 0 /100 WBCS (ref 0–0)
PHOSPHATE SERPL-MCNC: 2.3 MG/DL (ref 2.5–4.9)
PLATELET # BLD AUTO: 223 X10*3/UL (ref 150–450)
POTASSIUM SERPL-SCNC: 4.2 MMOL/L (ref 3.5–5.3)
RBC # BLD AUTO: 2.84 X10*6/UL (ref 4–5.2)
RH FACTOR (ANTIGEN D): NORMAL
SODIUM SERPL-SCNC: 138 MMOL/L (ref 136–145)
WBC # BLD AUTO: 13.1 X10*3/UL (ref 4.4–11.3)

## 2023-12-14 PROCEDURE — 84100 ASSAY OF PHOSPHORUS: CPT | Performed by: PHYSICIAN ASSISTANT

## 2023-12-14 PROCEDURE — 99232 SBSQ HOSP IP/OBS MODERATE 35: CPT

## 2023-12-14 PROCEDURE — 96372 THER/PROPH/DIAG INJ SC/IM: CPT | Performed by: NURSE PRACTITIONER

## 2023-12-14 PROCEDURE — 2500000004 HC RX 250 GENERAL PHARMACY W/ HCPCS (ALT 636 FOR OP/ED): Performed by: NURSE PRACTITIONER

## 2023-12-14 PROCEDURE — 99231 SBSQ HOSP IP/OBS SF/LOW 25: CPT | Performed by: STUDENT IN AN ORGANIZED HEALTH CARE EDUCATION/TRAINING PROGRAM

## 2023-12-14 PROCEDURE — 36415 COLL VENOUS BLD VENIPUNCTURE: CPT

## 2023-12-14 PROCEDURE — 2500000001 HC RX 250 WO HCPCS SELF ADMINISTERED DRUGS (ALT 637 FOR MEDICARE OP): Performed by: NURSE PRACTITIONER

## 2023-12-14 PROCEDURE — 1170000001 HC PRIVATE ONCOLOGY ROOM DAILY

## 2023-12-14 PROCEDURE — 1270000001 HC SEMI-PRIVATE ONCOLOGY ROOM DAILY

## 2023-12-14 PROCEDURE — 86901 BLOOD TYPING SEROLOGIC RH(D): CPT

## 2023-12-14 PROCEDURE — 2500000004 HC RX 250 GENERAL PHARMACY W/ HCPCS (ALT 636 FOR OP/ED): Performed by: STUDENT IN AN ORGANIZED HEALTH CARE EDUCATION/TRAINING PROGRAM

## 2023-12-14 PROCEDURE — 83735 ASSAY OF MAGNESIUM: CPT | Performed by: PHYSICIAN ASSISTANT

## 2023-12-14 PROCEDURE — 85027 COMPLETE CBC AUTOMATED: CPT | Performed by: PHYSICIAN ASSISTANT

## 2023-12-14 PROCEDURE — 36415 COLL VENOUS BLD VENIPUNCTURE: CPT | Performed by: PHYSICIAN ASSISTANT

## 2023-12-14 RX ORDER — KETOROLAC TROMETHAMINE 15 MG/ML
15 INJECTION, SOLUTION INTRAMUSCULAR; INTRAVENOUS EVERY 6 HOURS SCHEDULED
Status: DISCONTINUED | OUTPATIENT
Start: 2023-12-14 | End: 2023-12-15

## 2023-12-14 RX ORDER — HYDROMORPHONE HYDROCHLORIDE 1 MG/ML
0.2 INJECTION, SOLUTION INTRAMUSCULAR; INTRAVENOUS; SUBCUTANEOUS ONCE
Status: COMPLETED | OUTPATIENT
Start: 2023-12-14 | End: 2023-12-14

## 2023-12-14 RX ADMIN — HYDROMORPHONE HYDROCHLORIDE 0.2 MG: 1 INJECTION, SOLUTION INTRAMUSCULAR; INTRAVENOUS; SUBCUTANEOUS at 04:51

## 2023-12-14 RX ADMIN — METHOCARBAMOL 1000 MG: 1000 INJECTION, SOLUTION INTRAMUSCULAR; INTRAVENOUS at 23:00

## 2023-12-14 RX ADMIN — TRAMADOL HYDROCHLORIDE 100 MG: 50 TABLET, COATED ORAL at 04:04

## 2023-12-14 RX ADMIN — ONDANSETRON 4 MG: 2 INJECTION INTRAMUSCULAR; INTRAVENOUS at 21:53

## 2023-12-14 RX ADMIN — SODIUM CHLORIDE 100 ML/HR: 9 INJECTION, SOLUTION INTRAVENOUS at 11:00

## 2023-12-14 RX ADMIN — HEPARIN SODIUM 5000 UNITS: 5000 INJECTION INTRAVENOUS; SUBCUTANEOUS at 23:00

## 2023-12-14 RX ADMIN — GABAPENTIN 600 MG: 300 CAPSULE ORAL at 18:08

## 2023-12-14 RX ADMIN — ASPIRIN 81 MG: 81 TABLET, COATED ORAL at 08:08

## 2023-12-14 RX ADMIN — KETOROLAC TROMETHAMINE 15 MG: 15 INJECTION INTRAMUSCULAR; INTRAVENOUS at 21:54

## 2023-12-14 RX ADMIN — HEPARIN SODIUM 5000 UNITS: 5000 INJECTION INTRAVENOUS; SUBCUTANEOUS at 08:08

## 2023-12-14 RX ADMIN — METHOCARBAMOL 1000 MG: 1000 INJECTION, SOLUTION INTRAMUSCULAR; INTRAVENOUS at 14:00

## 2023-12-14 RX ADMIN — GABAPENTIN 600 MG: 300 CAPSULE ORAL at 06:19

## 2023-12-14 RX ADMIN — DOCUSATE SODIUM 100 MG: 100 CAPSULE, LIQUID FILLED ORAL at 08:08

## 2023-12-14 RX ADMIN — METHOCARBAMOL 1000 MG: 1000 INJECTION, SOLUTION INTRAMUSCULAR; INTRAVENOUS at 06:19

## 2023-12-14 RX ADMIN — DOCUSATE SODIUM 100 MG: 100 CAPSULE, LIQUID FILLED ORAL at 21:54

## 2023-12-14 RX ADMIN — ACETAMINOPHEN 650 MG: 325 TABLET ORAL at 17:05

## 2023-12-14 RX ADMIN — TRAMADOL HYDROCHLORIDE 50 MG: 50 TABLET, COATED ORAL at 11:00

## 2023-12-14 RX ADMIN — HYDROMORPHONE HYDROCHLORIDE 0.2 MG: 1 INJECTION, SOLUTION INTRAMUSCULAR; INTRAVENOUS; SUBCUTANEOUS at 05:09

## 2023-12-14 RX ADMIN — ONDANSETRON 4 MG: 2 INJECTION INTRAMUSCULAR; INTRAVENOUS at 05:14

## 2023-12-14 RX ADMIN — HEPARIN SODIUM 5000 UNITS: 5000 INJECTION INTRAVENOUS; SUBCUTANEOUS at 14:59

## 2023-12-14 RX ADMIN — ACETAMINOPHEN 650 MG: 325 TABLET ORAL at 06:19

## 2023-12-14 RX ADMIN — SODIUM CHLORIDE 100 ML/HR: 9 INJECTION, SOLUTION INTRAVENOUS at 05:09

## 2023-12-14 RX ADMIN — ACETAMINOPHEN 650 MG: 325 TABLET ORAL at 11:59

## 2023-12-14 RX ADMIN — TRAMADOL HYDROCHLORIDE 50 MG: 50 TABLET, COATED ORAL at 17:05

## 2023-12-14 RX ADMIN — TRAMADOL HYDROCHLORIDE 100 MG: 50 TABLET, COATED ORAL at 21:53

## 2023-12-14 ASSESSMENT — PAIN SCALES - GENERAL
PAINLEVEL_OUTOF10: 0 - NO PAIN
PAINLEVEL_OUTOF10: 8
PAINLEVEL_OUTOF10: 4
PAINLEVEL_OUTOF10: 10 - WORST POSSIBLE PAIN
PAINLEVEL_OUTOF10: 7
PAINLEVEL_OUTOF10: 5 - MODERATE PAIN
PAINLEVEL_OUTOF10: 3
PAINLEVEL_OUTOF10: 3
PAINLEVEL_OUTOF10: 10 - WORST POSSIBLE PAIN

## 2023-12-14 ASSESSMENT — COGNITIVE AND FUNCTIONAL STATUS - GENERAL
CLIMB 3 TO 5 STEPS WITH RAILING: TOTAL
WALKING IN HOSPITAL ROOM: TOTAL
STANDING UP FROM CHAIR USING ARMS: TOTAL
MOVING TO AND FROM BED TO CHAIR: TOTAL
DRESSING REGULAR LOWER BODY CLOTHING: TOTAL
DRESSING REGULAR UPPER BODY CLOTHING: A LOT
TURNING FROM BACK TO SIDE WHILE IN FLAT BAD: TOTAL
PERSONAL GROOMING: TOTAL
DAILY ACTIVITIY SCORE: 9
MOBILITY SCORE: 6
TOILETING: TOTAL
MOVING FROM LYING ON BACK TO SITTING ON SIDE OF FLAT BED WITH BEDRAILS: TOTAL
EATING MEALS: A LITTLE
HELP NEEDED FOR BATHING: TOTAL

## 2023-12-14 ASSESSMENT — PAIN - FUNCTIONAL ASSESSMENT
PAIN_FUNCTIONAL_ASSESSMENT: 0-10

## 2023-12-14 NOTE — CARE PLAN
the clinical goals for the shift include Patient will remain hemodynamically stable with adequate flap perfusion and positive dopplers throughout shift.    Over the shift, the patient made progress toward the following goals. Barriers to progression include N/A. Recommendations to address these barriers include N/A.      Problem: Pain - Adult  Goal: Verbalizes/displays adequate comfort level or baseline comfort level  Outcome: Progressing     Problem: Discharge Planning  Goal: Discharge to home or other facility with appropriate resources  Outcome: Progressing     Problem: Chronic Conditions and Co-morbidities  Goal: Patient's chronic conditions and co-morbidity symptoms are monitored and maintained or improved  Outcome: Progressing     Problem: Daily Care  Goal: Daily care needs are met  Outcome: Progressing     Problem: Psychosocial Needs  Goal: Demonstrates ability to cope with hospitalization/illness  Outcome: Progressing  Goal: Collaborate with me, my family, and caregiver to identify my specific goals  Outcome: Progressing     Problem: Indwelling Catheter Maintenance  Goal: I will have no complications from indwelling catheter  Outcome: Progressing     Problem: Skin  Goal: Decreased wound size/increased tissue granulation at next dressing change  Outcome: Progressing  Goal: Participates in plan/prevention/treatment measures  Outcome: Progressing  Goal: Prevent/manage excess moisture  Outcome: Progressing  Goal: Prevent/minimize sheer/friction injuries  Outcome: Progressing  Goal: Promote/optimize nutrition  Outcome: Progressing  Goal: Promote skin healing  Outcome: Progressing     Problem: Safety - Adult  Goal: Free from fall injury  Outcome: Progressing

## 2023-12-14 NOTE — PROGRESS NOTES
"Physical Therapy                 Therapy Communication Note    Patient Name: Lorranie Liu  MRN: [id number]  Today's Date: 12/14/2023     Discipline: Physical Therapy    Missed Visit Reason: Missed Visit Reason: Patient placed on medical hold (1211. Per EMR: \"Patient to maintain strict bedrest until POD #3\". Procedure was completed on 12/12. Will hold PT eval until cleared by medical team)    Missed Time: Attempt    Comment:  "

## 2023-12-14 NOTE — CARE PLAN
Problem: Daily Care  Goal: Daily care needs are met  Outcome: Progressing     Problem: Indwelling Catheter Maintenance  Goal: I will have no complications from indwelling catheter  Outcome: Met   The patient's goals for the shift include      The clinical goals for the shift include patients pain will be controlled this shift    Problem: Skin  Goal: Participates in plan/prevention/treatment measures  Outcome: Progressing  Goal: Promote skin healing  Outcome: Progressing     Patient aox3. Pain was kept at a comfortable level this shift. Will be Q2 hour doppler at 2200 tonight. Bear hugger was kept on for 5 hours and of for 1 and alternating. No complains of nausea. Patient is passing flatus.

## 2023-12-14 NOTE — SIGNIFICANT EVENT
Flap check  12/13 18:45  Right-sided EMI flap pink well perfused, warm to touch, no pallor or evidence of necrosis. Area of erythema at superior border of incision minimal/improving. Flap approximated with intact sutures, incision lines dressed with xeroform, no evidence of dehiscence or drainage. Intact doppler signals at medial and lateral aspects of flap.   DEBBIE with serosanguinous output.     12/14 1:30  Stable exam and appearance of right EMI flap as above. Dopper signals at medial and lateral aspects of flap intact.     12/14 5:50  Stable exam and appearance of right EMI flap as above. Dopper signals at medial and lateral aspects of flap intact.     More Montano MD  General Surgery  Plastic Surgery [phone]

## 2023-12-14 NOTE — PROGRESS NOTES
Acute Pain Service    Postop Pain HPI -   Palliative: relieved with IV analgesics and regional local anesthetics  Provocative: movement  Quality:  burning and aching  Radiation:  none  Severity:  3/10  Timing: constant    24-HOUR OPIOID CONSUMPTION:  Dilaudid 0.4 mg   Tramadol 150 mg    Scheduled medications  acetaminophen, 650 mg, oral, q6h VISH  aspirin, 81 mg, oral, Daily  docusate sodium, 100 mg, oral, BID  gabapentin, 600 mg, oral, q12h  heparin (porcine), 5,000 Units, subcutaneous, q8h  [Held by provider] lisinopril 20 mg, hydroCHLOROthiazide 12.5 mg for Zestoretic/Prinizide, , oral, Daily  methocarbamol, 1,000 mg, intravenous, q8h  scopolamine, 1 patch, transdermal, q72h  surgical lubricant, , ,       Continuous medications  ropivacaine (PF) in NS cmpd, 20 mL/hr  sodium chloride 0.9%, 100 mL/hr, Last Rate: 100 mL/hr (12/14/23 1000)      PRN medications  PRN medications: diphenhydrAMINE, HYDROmorphone, magnesium hydroxide, naloxone, ondansetron **OR** ondansetron, surgical lubricant, traMADol, traMADol     Physical Exam:  Constitutional:  no distress, alert and cooperative  Eyes: clear sclera  Head/Neck: No apparent injury, trachea midline  Respiratory/Thorax: Patent airways, thorax symmetric, breathing comfortably  Cardiovascular: no pitting edema  Gastrointestinal: Nondistended  Musculoskeletal: ROM intact  Extremities: no clubbing  Neurological: alert, sánchez x4  Psychological: Appropriate affect    Results for orders placed or performed during the hospital encounter of 12/12/23 (from the past 24 hour(s))   CBC   Result Value Ref Range    WBC 13.1 (H) 4.4 - 11.3 x10*3/uL    nRBC 0.0 0.0 - 0.0 /100 WBCs    RBC 2.84 (L) 4.00 - 5.20 x10*6/uL    Hemoglobin 8.7 (L) 12.0 - 16.0 g/dL    Hematocrit 28.2 (L) 36.0 - 46.0 %    MCV 99 80 - 100 fL    MCH 30.6 26.0 - 34.0 pg    MCHC 30.9 (L) 32.0 - 36.0 g/dL    RDW 12.8 11.5 - 14.5 %    Platelets 223 150 - 450 x10*3/uL   Renal Function Panel   Result Value Ref Range     Glucose 143 (H) 74 - 99 mg/dL    Sodium 138 136 - 145 mmol/L    Potassium 4.2 3.5 - 5.3 mmol/L    Chloride 110 (H) 98 - 107 mmol/L    Bicarbonate 21 21 - 32 mmol/L    Anion Gap 11 10 - 20 mmol/L    Urea Nitrogen 14 6 - 23 mg/dL    Creatinine 0.87 0.50 - 1.05 mg/dL    eGFR 79 >60 mL/min/1.73m*2    Calcium 7.5 (L) 8.6 - 10.6 mg/dL    Phosphorus 2.3 (L) 2.5 - 4.9 mg/dL    Albumin 3.1 (L) 3.4 - 5.0 g/dL   Magnesium   Result Value Ref Range    Magnesium 2.34 1.60 - 2.40 mg/dL      oLrraine Liu is a 53 y.o. year old female patient who presents for release contracture torso right, removal tissue expander right, reconstruction breast deep inferior epigastric  flap with Dr. Watkins. Acute Pain consulted for block for postoperative pain control.     PLAN  - erector spinae plane nerve blocks with catheters performed preoperatively 12/12   - Ambit ball with Ropivacaine 0.2%/NaCl 0.9% 500mL, Rate 7 cc/hr bilaterally  - Ambit medication will not interfere with pain medication prescribed by the primary team.   - Please be aware of local anesthetic toxic dose and absorption variability before considering lidocaine patches  - Acute pain service will follow while catheters in place  - Rest of pain medications per primary service  - Will refill ropivacaine bag today  -Acute pain service will continue to follow     Acute Pain Service Resident  pg [phone] ph [phone]

## 2023-12-14 NOTE — PROGRESS NOTES
Plastic Surgery Progress Note    Patient Name: Lorraine Liu  MRN: [id number]  Date:  12/14/23     Subjective  Resting comfortably in bed. Notes she experienced abdominal pain overnight that was relieved with paid medication. Last BM prior to surgery. +flatus. Notes LLQ abdominal pain that is only painful upon palpation. Relieved with pain medication. No pain with rest. Cr downtrending to 0.87 today. Denies fever, chest pain, SOB, abd pain, n/v/d, numbness, tingling.    Objective    Physical Exam  Constitutional: NAD  Eyes: EOMI, clear sclera .  ENMT: Moist mucous membranes, no apparent injuries or lesions.  Head/Neck: NCAT  Cardiovascular: Normal rate and regular rhythm. 2+ equal pulses of the distal extremities.  Respiratory/Thorax: CTAB, regular respirations on RA. Good symmetric chest expansion.   Gastrointestinal: Abdomen taut, non-distended, appropriate generalized tenderness. Tender to palpation to LLQ with edema. Incisional wound vac to pfannenstiel incision without alarms for leak or malfunction. Holding appropriate suction at -125mmHg. Tissue surrounding vac dressing without erythema or edema. Aquacel AG covering umbilicus, c/d/I, pain block in place   Genitourinary: Voiding via indwelling de anda catheter. Clear, yellow output  Extremities: No peripheral edema. Moving all 4 extremities actively.   Neurological: A&Ox3.   Psychological: Appropriate mood and behavior.   Breast: EMI free flap recipient site to right breast which is appropriate color, without pallor. Warm to touch, soft. Flap approximated intact sutures and incision lines dressed with Xeroform dressing, no evidence of bleeding or dehiscence. Mild puckering noted to superior medial aspect of incision. Intact venous Doppler pulse at lateral and medial aspects of breast flap tissue. Appropriately tender to palpation at flap and surrounding tissue. 2+ Cap refill at the flap. No areas of surrounding edema, fluid collection, induration, drainage or  bleeding. x1 DEBBIE drain to lateral breast with serosanguinous output.    Diagnostics   Results for orders placed or performed during the hospital encounter of 12/12/23 (from the past 24 hour(s))   CBC   Result Value Ref Range    WBC 13.1 (H) 4.4 - 11.3 x10*3/uL    nRBC 0.0 0.0 - 0.0 /100 WBCs    RBC 2.84 (L) 4.00 - 5.20 x10*6/uL    Hemoglobin 8.7 (L) 12.0 - 16.0 g/dL    Hematocrit 28.2 (L) 36.0 - 46.0 %    MCV 99 80 - 100 fL    MCH 30.6 26.0 - 34.0 pg    MCHC 30.9 (L) 32.0 - 36.0 g/dL    RDW 12.8 11.5 - 14.5 %    Platelets 223 150 - 450 x10*3/uL   Renal Function Panel   Result Value Ref Range    Glucose 143 (H) 74 - 99 mg/dL    Sodium 138 136 - 145 mmol/L    Potassium 4.2 3.5 - 5.3 mmol/L    Chloride 110 (H) 98 - 107 mmol/L    Bicarbonate 21 21 - 32 mmol/L    Anion Gap 11 10 - 20 mmol/L    Urea Nitrogen 14 6 - 23 mg/dL    Creatinine 0.87 0.50 - 1.05 mg/dL    eGFR 79 >60 mL/min/1.73m*2    Calcium 7.5 (L) 8.6 - 10.6 mg/dL    Phosphorus 2.3 (L) 2.5 - 4.9 mg/dL    Albumin 3.1 (L) 3.4 - 5.0 g/dL   Magnesium   Result Value Ref Range    Magnesium 2.34 1.60 - 2.40 mg/dL     ECG 12 Lead    Result Date: 11/29/2023  Normal sinus rhythm Normal ECG When compared with ECG of 04-MAR-2021 10:43, No significant change was found Confirmed by Adam Lau ([id number]) on 11/29/2023 11:54:26 AM      Current Medications  Scheduled medications  acetaminophen, 650 mg, oral, q6h VISH  aspirin, 81 mg, oral, Daily  docusate sodium, 100 mg, oral, BID  gabapentin, 600 mg, oral, q12h  heparin (porcine), 5,000 Units, subcutaneous, q8h  [Held by provider] lisinopril 20 mg, hydroCHLOROthiazide 12.5 mg for Zestoretic/Prinizide, , oral, Daily  methocarbamol, 1,000 mg, intravenous, q8h  scopolamine, 1 patch, transdermal, q72h  surgical lubricant, , ,       Continuous medications  ropivacaine (PF) in NS cmpd, 20 mL/hr  sodium chloride 0.9%, 100 mL/hr, Last Rate: 100 mL/hr (12/14/23 1100)      PRN medications  PRN medications: diphenhydrAMINE, HYDROmorphone,  magnesium hydroxide, naloxone, ondansetron **OR** ondansetron, surgical lubricant, traMADol, traMADol     Assessment/Plan  Lorraine Liu is a 53 y.o. female with a past medical history of meningioma and R breast cancer s/p mastectomy with immediate reconstruction via tissue expander placement on 3/19/2021. She presented for further R breast reconstruction via EMI free flap on 12/12 with Dr. Watkins of Plastic Surgery.     # S/p Right Breast EMI:   - Continue serial flap assessments Q2hour per nursing and Q4hour per plastic surgery team       ·  Assess Doppler pulse at marked site plus flap appearance (color, warmth, turgor)       ·  Nursing to notify plastic surgery team immediately if there are any acute changes          (Including decreased Doppler signal, pallor, temperature change, bleeding, etc.)  - Continue use of [place] hugger to right breast       ·  Settings: low heat, medium continuous fan        ·  5 hours on, one hour off x 3 days post op   - Daily local wound care/dressing changes per plastic surgery APPs (Xeroform over incision lines)       ·  Monitor surgical sites for S/S of bleeding, infection or dehiscence   - Continue DEBBIE drain care per nursing       ·  Strip drain tubing TID and PRN       ·  Monitor and record output [id number]        ·  Keep drain sites C/D/I with daily drain dressing changes        ·  Call plastics if drain output is >30cc in an hour or greater than 200cc over 8 hours  - Continue incisional wound vac therapy to abdomen x10-14 days post op        ·  Settings: 125 mmHg low continuous suction        ·  Please keep dressing C/D/I, reinforce PRN        ·  Record vac output per nursing q8h       ·  Please alert plastics team with any concerns regarding vac        ·  Plan to transition to Prevena homegoing vac on day of discharge    - Maintain beach chair positioning        ·  Avoid positioning that would apply pressure to flap region or incisions   - Patient to maintain strict bedrest  until POD#3   - Maintain Horvath while on bedrest with anticipated removal on POD#3 after successfully working with PT/OT  - Regular diet  - Maintain BP of 110/60 minimum to ensure adequate flap perfusion   - Low transfusion threshold to ensure adequate flap perfusion  - SQ Heparin [id number] resumed postoperatively, transition to Lovenox on POD#3 (12/15)   - Daily ASA started POD #1 and continue x30 days post op (end date 1/12)   - Monitor VS/pulse ox [id number]   - Monitor AM CBC/RFP/Mg     # Acute postoperative pain  - Well controlled per patient, continue current regimen       ·  Dilaudid IV, wean as tolerated       ·  Scheduled Tylenol 650mg PO [id number]        · Gabapentin 600mg PO BID        · Robaxin 1000mg IV [id number]       · Scopolamine patch [id number]  - IV Toradol held POD#1 due to slightly elevated sCre, consider restarting with normalization of kidney function   - Bowel regimen with Colace 100 mg PO BID while using narcotics  - IV Zofran PRN nausea due to narcotics   - Pain assessments [id number]     # Anemia   - Incoming HGB on admission at 12.2  - HGB on AM labs at 8.7  - Presently no S/S of bleeding, considered likely due to intraoperative blood loss   - Keep T&S UTD, last obtained 12/14  - Monitor for S/S of bleeding or symptomatic anemia   - Low transfusion threshold to ensure adequate flap perfusion  - Transfuse for HGB <7 per protocol   - Monitor AM CBC     # Hx HTN   - BP currently stable   - Elevated OVN 12/13 (147/63) then stabilized in AM to 115/77. Continue to hold BP meds  - Resume home BP meds (hold Lisinopril/hydrochlorothiazide when able)   - Maintain BP of 110/60 minimum to ensure adequate flap perfusion        ·  Please avoid use of pressors as able               -> Recommended use of dobutamine and norepinephrine as indicated only        ·  For HTN, please avoid hydralazine if possible               -> Prefer use of calcium channel blockers as indicated for HTN (amlodipine)    Prophylaxis:   - DVT:  SQ heparin [id number] (plan to transition to SQ Lovenox on POD#3), ASA 81 once daily, SCDs  - Encourage IS x10 every hour while awake   - Bowel regimen: Colace 100mg BID     FEN/GI:   - DC IVMF 12/14  - Regular diet   - Monitor electrolytes and replete PRN   - Bowel regimen with Colace BID   - IV Zofran PRN N/V       Disposition: Continue care on RNF. Will remain inpatient for continued flap monitoring and vac/drain surveillance postoperatively. Follow up appointment with plastic surgery department to be scheduled closer to anticipated date of discharge.     Patient and plan discussed with Dr. Abrams PA-C  Plastic and Reconstructive Surgery  Epic chat, Pager [phone], Team phones: [phone]

## 2023-12-15 ENCOUNTER — APPOINTMENT (OUTPATIENT)
Dept: VASCULAR MEDICINE | Facility: HOSPITAL | Age: 53
DRG: 585 | End: 2023-12-15
Payer: COMMERCIAL

## 2023-12-15 LAB
ALBUMIN SERPL BCP-MCNC: 2.9 G/DL (ref 3.4–5)
ANION GAP SERPL CALC-SCNC: 13 MMOL/L (ref 10–20)
BUN SERPL-MCNC: 11 MG/DL (ref 6–23)
CALCIUM SERPL-MCNC: 7.5 MG/DL (ref 8.6–10.6)
CHLORIDE SERPL-SCNC: 108 MMOL/L (ref 98–107)
CO2 SERPL-SCNC: 20 MMOL/L (ref 21–32)
CREAT SERPL-MCNC: 0.86 MG/DL (ref 0.5–1.05)
ERYTHROCYTE [DISTWIDTH] IN BLOOD BY AUTOMATED COUNT: 12.7 % (ref 11.5–14.5)
GFR SERPL CREATININE-BSD FRML MDRD: 80 ML/MIN/1.73M*2
GLUCOSE SERPL-MCNC: 111 MG/DL (ref 74–99)
HCT VFR BLD AUTO: 27.8 % (ref 36–46)
HGB BLD-MCNC: 8.6 G/DL (ref 12–16)
MAGNESIUM SERPL-MCNC: 2.34 MG/DL (ref 1.6–2.4)
MCH RBC QN AUTO: 30.7 PG (ref 26–34)
MCHC RBC AUTO-ENTMCNC: 30.9 G/DL (ref 32–36)
MCV RBC AUTO: 99 FL (ref 80–100)
NRBC BLD-RTO: 0.1 /100 WBCS (ref 0–0)
PHOSPHATE SERPL-MCNC: 2.1 MG/DL (ref 2.5–4.9)
PLATELET # BLD AUTO: ABNORMAL 10*3/UL
POTASSIUM SERPL-SCNC: 4.3 MMOL/L (ref 3.5–5.3)
RBC # BLD AUTO: 2.8 X10*6/UL (ref 4–5.2)
SODIUM SERPL-SCNC: 137 MMOL/L (ref 136–145)
WBC # BLD AUTO: 13.8 X10*3/UL (ref 4.4–11.3)

## 2023-12-15 PROCEDURE — 83735 ASSAY OF MAGNESIUM: CPT | Performed by: PHYSICIAN ASSISTANT

## 2023-12-15 PROCEDURE — 96372 THER/PROPH/DIAG INJ SC/IM: CPT | Performed by: NURSE PRACTITIONER

## 2023-12-15 PROCEDURE — 2500000004 HC RX 250 GENERAL PHARMACY W/ HCPCS (ALT 636 FOR OP/ED)

## 2023-12-15 PROCEDURE — 2500000001 HC RX 250 WO HCPCS SELF ADMINISTERED DRUGS (ALT 637 FOR MEDICARE OP): Performed by: NURSE PRACTITIONER

## 2023-12-15 PROCEDURE — 36415 COLL VENOUS BLD VENIPUNCTURE: CPT | Performed by: PHYSICIAN ASSISTANT

## 2023-12-15 PROCEDURE — 99231 SBSQ HOSP IP/OBS SF/LOW 25: CPT | Performed by: STUDENT IN AN ORGANIZED HEALTH CARE EDUCATION/TRAINING PROGRAM

## 2023-12-15 PROCEDURE — 1170000001 HC PRIVATE ONCOLOGY ROOM DAILY

## 2023-12-15 PROCEDURE — 99232 SBSQ HOSP IP/OBS MODERATE 35: CPT

## 2023-12-15 PROCEDURE — 1270000001 HC SEMI-PRIVATE ONCOLOGY ROOM DAILY

## 2023-12-15 PROCEDURE — 2500000004 HC RX 250 GENERAL PHARMACY W/ HCPCS (ALT 636 FOR OP/ED): Performed by: NURSE PRACTITIONER

## 2023-12-15 PROCEDURE — 2500000001 HC RX 250 WO HCPCS SELF ADMINISTERED DRUGS (ALT 637 FOR MEDICARE OP)

## 2023-12-15 PROCEDURE — 80069 RENAL FUNCTION PANEL: CPT | Performed by: PHYSICIAN ASSISTANT

## 2023-12-15 PROCEDURE — 93971 EXTREMITY STUDY: CPT

## 2023-12-15 PROCEDURE — 96372 THER/PROPH/DIAG INJ SC/IM: CPT

## 2023-12-15 PROCEDURE — 85027 COMPLETE CBC AUTOMATED: CPT | Performed by: PHYSICIAN ASSISTANT

## 2023-12-15 PROCEDURE — 93971 EXTREMITY STUDY: CPT | Performed by: INTERNAL MEDICINE

## 2023-12-15 RX ORDER — METHOCARBAMOL 500 MG/1
500 TABLET, FILM COATED ORAL EVERY 6 HOURS SCHEDULED
Status: DISCONTINUED | OUTPATIENT
Start: 2023-12-15 | End: 2023-12-18 | Stop reason: HOSPADM

## 2023-12-15 RX ORDER — POLYETHYLENE GLYCOL 3350 17 G/17G
17 POWDER, FOR SOLUTION ORAL DAILY PRN
Status: DISCONTINUED | OUTPATIENT
Start: 2023-12-15 | End: 2023-12-16

## 2023-12-15 RX ORDER — ENOXAPARIN SODIUM 100 MG/ML
40 INJECTION SUBCUTANEOUS DAILY
Status: DISCONTINUED | OUTPATIENT
Start: 2023-12-15 | End: 2023-12-18 | Stop reason: HOSPADM

## 2023-12-15 RX ADMIN — DOCUSATE SODIUM 100 MG: 100 CAPSULE, LIQUID FILLED ORAL at 20:48

## 2023-12-15 RX ADMIN — TRAMADOL HYDROCHLORIDE 50 MG: 50 TABLET, COATED ORAL at 20:48

## 2023-12-15 RX ADMIN — METHOCARBAMOL 500 MG: 500 TABLET ORAL at 17:31

## 2023-12-15 RX ADMIN — DOCUSATE SODIUM 100 MG: 100 CAPSULE, LIQUID FILLED ORAL at 09:57

## 2023-12-15 RX ADMIN — Medication: at 17:31

## 2023-12-15 RX ADMIN — HEPARIN SODIUM 5000 UNITS: 5000 INJECTION INTRAVENOUS; SUBCUTANEOUS at 06:30

## 2023-12-15 RX ADMIN — METHOCARBAMOL 500 MG: 500 TABLET ORAL at 07:45

## 2023-12-15 RX ADMIN — TRAMADOL HYDROCHLORIDE 50 MG: 50 TABLET, COATED ORAL at 09:57

## 2023-12-15 RX ADMIN — GABAPENTIN 600 MG: 300 CAPSULE ORAL at 17:31

## 2023-12-15 RX ADMIN — GABAPENTIN 600 MG: 300 CAPSULE ORAL at 06:19

## 2023-12-15 RX ADMIN — ACETAMINOPHEN 650 MG: 325 TABLET ORAL at 12:26

## 2023-12-15 RX ADMIN — ACETAMINOPHEN 650 MG: 325 TABLET ORAL at 06:20

## 2023-12-15 RX ADMIN — ENOXAPARIN SODIUM 40 MG: 100 INJECTION SUBCUTANEOUS at 16:40

## 2023-12-15 RX ADMIN — ACETAMINOPHEN 650 MG: 325 TABLET ORAL at 17:20

## 2023-12-15 RX ADMIN — METHOCARBAMOL 500 MG: 500 TABLET ORAL at 12:26

## 2023-12-15 RX ADMIN — ASPIRIN 81 MG: 81 TABLET, COATED ORAL at 09:57

## 2023-12-15 RX ADMIN — SCOLOPAMINE TRANSDERMAL SYSTEM 1 PATCH: 1 PATCH, EXTENDED RELEASE TRANSDERMAL at 00:10

## 2023-12-15 ASSESSMENT — COGNITIVE AND FUNCTIONAL STATUS - GENERAL
DRESSING REGULAR LOWER BODY CLOTHING: A LOT
EATING MEALS: A LITTLE
DAILY ACTIVITIY SCORE: 12
TURNING FROM BACK TO SIDE WHILE IN FLAT BAD: TOTAL
DAILY ACTIVITIY SCORE: 12
MOVING FROM LYING ON BACK TO SITTING ON SIDE OF FLAT BED WITH BEDRAILS: A LOT
MOVING TO AND FROM BED TO CHAIR: A LOT
STANDING UP FROM CHAIR USING ARMS: TOTAL
PERSONAL GROOMING: A LOT
MOBILITY SCORE: 10
EATING MEALS: A LITTLE
DRESSING REGULAR LOWER BODY CLOTHING: A LOT
WALKING IN HOSPITAL ROOM: A LOT
PERSONAL GROOMING: A LOT
TOILETING: TOTAL
WALKING IN HOSPITAL ROOM: A LOT
HELP NEEDED FOR BATHING: A LOT
MOVING FROM LYING ON BACK TO SITTING ON SIDE OF FLAT BED WITH BEDRAILS: A LOT
MOBILITY SCORE: 10
DRESSING REGULAR UPPER BODY CLOTHING: A LOT
CLIMB 3 TO 5 STEPS WITH RAILING: A LOT
TOILETING: TOTAL
CLIMB 3 TO 5 STEPS WITH RAILING: A LOT
DRESSING REGULAR UPPER BODY CLOTHING: A LOT
MOVING TO AND FROM BED TO CHAIR: A LOT
HELP NEEDED FOR BATHING: A LOT
STANDING UP FROM CHAIR USING ARMS: TOTAL
TURNING FROM BACK TO SIDE WHILE IN FLAT BAD: TOTAL

## 2023-12-15 ASSESSMENT — PAIN SCALES - GENERAL
PAINLEVEL_OUTOF10: 1
PAINLEVEL_OUTOF10: 4

## 2023-12-15 ASSESSMENT — PAIN DESCRIPTION - LOCATION: LOCATION: ABDOMEN

## 2023-12-15 ASSESSMENT — PAIN DESCRIPTION - ORIENTATION: ORIENTATION: LOWER

## 2023-12-15 ASSESSMENT — PAIN - FUNCTIONAL ASSESSMENT
PAIN_FUNCTIONAL_ASSESSMENT: 0-10
PAIN_FUNCTIONAL_ASSESSMENT: 0-10

## 2023-12-15 NOTE — PROGRESS NOTES
Physical Therapy                 Therapy Communication Note    Patient Name: Lorraine Liu  MRN: [id number]  Today's Date: 12/15/2023     Discipline: Physical Therapy    Missed Visit Reason: Missed Visit Reason: Patient in a medical procedure (1535. Pt. off division at vascular lab.)    Missed Time: Attempt    Comment:

## 2023-12-15 NOTE — PROGRESS NOTES
Lorraine Liu is a 53 y.o. female on day 3 of admission presenting with Acquired absence of breast and absent nipple, right.  TCC Note    Plan per Medical/Surgical Team: Flap assessments q 2hours, wound vac, maintain beach chair position, bed rest, de anda  Status: Inpatient  Payor Source: [facility]  Discharge disposition: Home pending PT recs  Expected date of discharge: 12/20/23  Barriers: None  Primary Oncologist:  Preferred home care agency:  Will continue to follow patient for any discharge needs.     Ema Dunaway RN

## 2023-12-15 NOTE — PROGRESS NOTES
Acute Pain Service  Postop Pain HPI -   Palliative: relieved with IV analgesics and regional local anesthetics  Provocative: movement  Quality:  burning and aching  Radiation:  none  Severity:  1-2/10  Timing: constant    24-HOUR OPIOID CONSUMPTION:  Dilaudid 0.2mg  Tramadol 200mg    Scheduled medications  acetaminophen, 650 mg, oral, q6h VISH  aspirin, 81 mg, oral, Daily  docusate sodium, 100 mg, oral, BID  enoxaparin, 40 mg, subcutaneous, Daily  gabapentin, 600 mg, oral, q12h  [Held by provider] lisinopril 20 mg, hydroCHLOROthiazide 12.5 mg for Zestoretic/Prinizide, , oral, Daily  methocarbamol, 500 mg, oral, q6h VISH  scopolamine, 1 patch, transdermal, q72h      Continuous medications  ropivacaine (PF) in NS cmpd, 20 mL/hr      PRN medications  PRN medications: diphenhydrAMINE, HYDROmorphone, magnesium hydroxide, naloxone, ondansetron **OR** ondansetron, polyethylene glycol, traMADol, traMADol     Physical Exam:  Constitutional:  no distress, alert and cooperative  Eyes: clear sclera  Head/Neck: No apparent injury, trachea midline  Respiratory/Thorax: Patent airways, thorax symmetric, breathing comfortably  Cardiovascular: no pitting edema  Gastrointestinal: Nondistended  Musculoskeletal: ROM intact  Extremities: no clubbing  Neurological: alert, sánchez x4  Psychological: Appropriate affect    Results for orders placed or performed during the hospital encounter of 12/12/23 (from the past 24 hour(s))   Type and screen   Result Value Ref Range    ABO TYPE B     Rh TYPE POS     ANTIBODY SCREEN NEG    CBC   Result Value Ref Range    WBC 13.8 (H) 4.4 - 11.3 x10*3/uL    nRBC 0.1 (H) 0.0 - 0.0 /100 WBCs    RBC 2.80 (L) 4.00 - 5.20 x10*6/uL    Hemoglobin 8.6 (L) 12.0 - 16.0 g/dL    Hematocrit 27.8 (L) 36.0 - 46.0 %    MCV 99 80 - 100 fL    MCH 30.7 26.0 - 34.0 pg    MCHC 30.9 (L) 32.0 - 36.0 g/dL    RDW 12.7 11.5 - 14.5 %    Platelets     Renal Function Panel   Result Value Ref Range    Glucose 111 (H) 74 - 99 mg/dL    Sodium  137 136 - 145 mmol/L    Potassium 4.3 3.5 - 5.3 mmol/L    Chloride 108 (H) 98 - 107 mmol/L    Bicarbonate 20 (L) 21 - 32 mmol/L    Anion Gap 13 10 - 20 mmol/L    Urea Nitrogen 11 6 - 23 mg/dL    Creatinine 0.86 0.50 - 1.05 mg/dL    eGFR 80 >60 mL/min/1.73m*2    Calcium 7.5 (L) 8.6 - 10.6 mg/dL    Phosphorus 2.1 (L) 2.5 - 4.9 mg/dL    Albumin 2.9 (L) 3.4 - 5.0 g/dL   Magnesium   Result Value Ref Range    Magnesium 2.34 1.60 - 2.40 mg/dL      Lorraine Liu is a 53 y.o. year old female patient who presents for release contracture torso right, removal tissue expander right, reconstruction breast deep inferior epigastric  flap with Dr. Watkins. Acute Pain consulted for block for postoperative pain control.      PLAN  - erector spinae plane nerve blocks with catheters performed preoperatively 12/12   - Ambit ball with Ropivacaine 0.2%/NaCl 0.9% 500mL, Rate 7 cc/hr bilaterally  - Ambit medication will not interfere with pain medication prescribed by the primary team.   - Please be aware of local anesthetic toxic dose and absorption variability before considering lidocaine patches  - Acute pain service will follow while catheters in place  - Rest of pain medications per primary service  - Will refill ropivacaine bag today  -Acute pain service will continue to follow     Acute Pain Service Resident  pg [phone]  [phone]

## 2023-12-15 NOTE — PROGRESS NOTES
Physical Therapy                 Therapy Communication Note    Patient Name: Lorraine Liu  MRN: [id number]  Today's Date: 12/15/2023     Discipline: Physical Therapy    Missed Visit Reason: Missed Visit Reason: Patient placed on medical hold (1011. Per plastics BEATRICE Bob, pt. is off bedrest. However, pt. pending US to r/o L UE DVT. Will hold until medically cleared to participate.)    Missed Time: Attempt    Comment:

## 2023-12-15 NOTE — SIGNIFICANT EVENT
sanya Liu is a 53 y.o. female with a past medical history of meningioma and R breast cancer s/p mastectomy with immediate reconstruction via tissue expander placement on 3/19/2021. S/P R breast reconstruction via EMI free flap on 12/12 with Dr. Watkins of Plastic Surgery.     Subjective:  Resting comfortably in bed. Continues to c/o mild pain, 4/10 to abdomen, but better compared to last night. Last BM prior to surgery. +flatus. C/o pain to left forearm where IV is, area of ecchymosis. Notes LLQ abdominal pain that is only painful upon palpation. Relieved with pain medication. No pain with rest. Cr downtrending to 0.87 today. Denies any fever, chills, night sweats, CP, SOB, palpitations, nausea, vomiting, diarrhea, constipation, dysuria, hematuria, hematochezia, hematemesis, flank pain.      Objective:  Physical Exam  Constitutional: NAD  Eyes: EOMI, clear sclera .  ENMT: Moist mucous membranes, no apparent injuries or lesions.  Head/Neck: NCAT  Cardiovascular: Normal rate and regular rhythm. 2+ equal pulses of the distal extremities.  Respiratory/Thorax: CTAB, regular respirations on RA. Good symmetric chest expansion.   Gastrointestinal: Abdomen taut, non-distended, appropriate generalized tenderness. Tender to palpation to LLQ with edema. Incisional wound vac to pfannenstiel incision without alarms for leak or malfunction. Holding appropriate suction at -125mmHg. Tissue surrounding vac dressing without erythema or edema. Aquacel AG covering umbilicus, c/d/I, pain block in place   Genitourinary: Voiding via indwelling de anda catheter. Clear, yellow output  Extremities: Left forearm with IV infiltration, no firmness or fluctuance. Ecchymosis to left forearm proximal to IV. No peripheral edema. Moving all 4 extremities actively.   Neurological: A&Ox3.   Psychological: Appropriate mood and behavior.   Breast: EMI free flap recipient site to right breast which is appropriate color, without pallor. Warm to touch,  soft. Flap approximated intact sutures and incision lines dressed with Xeroform dressing, no evidence of bleeding or dehiscence. Mild puckering noted to superior medial aspect of incision. Intact venous Doppler pulse at lateral and medial aspects of breast flap tissue. Appropriately tender to palpation at flap and surrounding tissue. 2+ Cap refill at the flap. No areas of surrounding edema, fluid collection, induration, drainage or bleeding. x1 DEBBIE drain to lateral breast with serosanguinous output.     # S/p Right Breast EMI:   - Continue serial flap assessments Q2hour per nursing and Q4hour per plastic surgery team       ·  Assess Doppler pulse at marked site plus flap appearance (color, warmth, turgor)       ·  Nursing to notify plastic surgery team immediately if there are any acute changes          (Including decreased Doppler signal, pallor, temperature change, bleeding, etc.)  - Continue use of [place] hugger to right breast (ok to leave off if patient isn't tolerating)       ·  Settings: low heat, medium continuous fan        ·  5 hours on, one hour off x 3 days post op   - Daily local wound care/dressing changes per plastic surgery APPs (Xeroform over incision lines)       ·  Monitor surgical sites for S/S of bleeding, infection or dehiscence   - Continue DEBBIE drain care per nursing       ·  Strip drain tubing TID and PRN       ·  Monitor and record output [id number]        ·  Keep drain sites C/D/I with daily drain dressing changes        ·  Call plastics if drain output is >30cc in an hour or greater than 200cc over 8 hours  - Continue incisional wound vac therapy to abdomen x10-14 days post op        ·  Settings: 125 mmHg low continuous suction        ·  Please keep dressing C/D/I, reinforce PRN        ·  Record vac output per nursing q8h       ·  Please alert plastics team with any concerns regarding vac        ·  Plan to transition to Prevena homegoing vac on day of discharge    - Maintain beach chair  positioning        ·  Avoid positioning that would apply pressure to flap region or incisions   - Patient to maintain strict bedrest until POD#3   - Maintain Horvath while on bedrest with anticipated removal on POD#3 after successfully working with PT/OT  - Regular diet  - Maintain BP of 110/60 minimum to ensure adequate flap perfusion   - Low transfusion threshold to ensure adequate flap perfusion  - SQ Heparin [id number] resumed postoperatively, transition to Lovenox on POD#3 (12/15)   - Daily ASA started POD #1 and continue x30 days post op (end date 1/12)   - Monitor VS/pulse ox [id number]   - Monitor AM CBC/RFP/Mg     #IV infiltration:  - remove IV from left forearm  - US to left forearm (ordered)     # Acute postoperative pain  - Well controlled per patient, continue current regimen       ·  Dilaudid IV, wean as tolerated       ·  Scheduled Tylenol 650mg PO [id number]        · Gabapentin 600mg PO BID        · Robaxin 1000mg IV [id number]       · Scopolamine patch [id number]  - IV Toradol held POD#1 due to slightly elevated sCre, consider restarting with normalization of kidney function   - Bowel regimen with Colace 100 mg PO BID while using narcotics  - IV Zofran PRN nausea due to narcotics   - Pain assessments [id number]      # Anemia   - Incoming HGB on admission at 12.2  - HGB on AM labs at 8.7  - Presently no S/S of bleeding, considered likely due to intraoperative blood loss   - Keep T&S UTD, last obtained 12/14  - Monitor for S/S of bleeding or symptomatic anemia   - Low transfusion threshold to ensure adequate flap perfusion  - Transfuse for HGB <7 per protocol   - Monitor AM CBC      # Hx HTN   - BP currently stable   - Elevated OVN 12/13 (147/63) then stabilized in AM to 115/77. Continue to hold BP meds  - Resume home BP meds (hold Lisinopril/hydrochlorothiazide when able)   - Maintain BP of 110/60 minimum to ensure adequate flap perfusion        ·  Please avoid use of pressors as able               -> Recommended use  of dobutamine and norepinephrine as indicated only        ·  For HTN, please avoid hydralazine if possible               -> Prefer use of calcium channel blockers as indicated for HTN (amlodipine)     Prophylaxis:   - DVT: SQ heparin [id number] (plan to transition to SQ Lovenox on POD#3), ASA 81 once daily, SCDs  - Encourage IS x10 every hour while awake   - Bowel regimen: Colace 100mg BID, miralax prn       FEN/GI:   - DC IVMF 12/14  - Regular diet   - Monitor electrolytes and replete PRN   - Bowel regimen with Colace BID   - IV Zofran PRN N/V       Disposition: Continue care on RNF. Will remain inpatient for continued flap monitoring and vac/drain surveillance postoperatively. Follow up appointment with plastic surgery department to be scheduled closer to anticipated date of discharge.

## 2023-12-15 NOTE — PROGRESS NOTES
Plastic Surgery Progress Note    Patient Name: Lorraine Liu  MRN: [id number]  Date:  12/15/23     Subjective  Resting comfortably in bed with  at bedside. Pain well controlled this morning. Last BM prior to surgery. +flatus. Notes LLQ abdominal pain that is only painful upon palpation. Relieved with pain medication. No pain with rest. Denies fever, chest pain, SOB, abd pain, n/v/d.    Objective    Physical Exam  Constitutional: NAD  Eyes: EOMI, clear sclera .  ENMT: Moist mucous membranes, no apparent injuries or lesions.  Head/Neck: NCAT  Cardiovascular: Normal rate and regular rhythm. 2+ equal pulses of the distal extremities.  Respiratory/Thorax: CTAB, regular respirations on RA. Good symmetric chest expansion.   Gastrointestinal: Abdomen taut, non-distended, appropriate generalized tenderness. Tender to palpation to LLQ with edema. Incisional wound vac to pfannenstiel incision without alarms for leak or malfunction. Holding appropriate suction at -125mmHg. Tissue surrounding vac dressing without erythema or edema. Aquacel AG covering umbilicus, c/d/I, pain block in place   Genitourinary: Voiding via indwelling de anda catheter. Clear, yellow output  Extremities: Left forearm edema, no firmness or fluctuance. Ecchymosis to left forearm proximal to IV. No peripheral edema. Moving all 4 extremities actively.   Neurological: A&Ox3.   Psychological: Appropriate mood and behavior.   Breast: EMI free flap recipient site to right breast which is appropriate color, without pallor. Warm to touch, soft. Flap approximated intact sutures and incision lines dressed with Xeroform dressing, no evidence of bleeding or dehiscence. Mild puckering noted to superior medial aspect of incision. Intact venous Doppler pulse at lateral and medial aspects of breast flap tissue. Appropriately tender to palpation at flap and surrounding tissue. 2+ Cap refill at the flap. No areas of surrounding edema, fluid collection, induration,  drainage or bleeding. x1 DEBBIE drain to lateral breast with serosanguinous output.    Diagnostics   Results for orders placed or performed during the hospital encounter of 12/12/23 (from the past 24 hour(s))   Type and screen   Result Value Ref Range    ABO TYPE B     Rh TYPE POS     ANTIBODY SCREEN NEG    Renal Function Panel   Result Value Ref Range    Glucose 111 (H) 74 - 99 mg/dL    Sodium 137 136 - 145 mmol/L    Potassium 4.3 3.5 - 5.3 mmol/L    Chloride 108 (H) 98 - 107 mmol/L    Bicarbonate 20 (L) 21 - 32 mmol/L    Anion Gap 13 10 - 20 mmol/L    Urea Nitrogen 11 6 - 23 mg/dL    Creatinine 0.86 0.50 - 1.05 mg/dL    eGFR 80 >60 mL/min/1.73m*2    Calcium 7.5 (L) 8.6 - 10.6 mg/dL    Phosphorus 2.1 (L) 2.5 - 4.9 mg/dL    Albumin 2.9 (L) 3.4 - 5.0 g/dL   Magnesium   Result Value Ref Range    Magnesium 2.34 1.60 - 2.40 mg/dL     ECG 12 Lead    Result Date: 11/29/2023  Normal sinus rhythm Normal ECG When compared with ECG of 04-MAR-2021 10:43, No significant change was found Confirmed by Adam Lau ([id number]) on 11/29/2023 11:54:26 AM      Current Medications  Scheduled medications  acetaminophen, 650 mg, oral, q6h VISH  aspirin, 81 mg, oral, Daily  docusate sodium, 100 mg, oral, BID  gabapentin, 600 mg, oral, q12h  heparin (porcine), 5,000 Units, subcutaneous, q8h  [Held by provider] lisinopril 20 mg, hydroCHLOROthiazide 12.5 mg for Zestoretic/Prinizide, , oral, Daily  methocarbamol, 500 mg, oral, q6h VISH  scopolamine, 1 patch, transdermal, q72h      Continuous medications  ropivacaine (PF) in NS cmpd, 20 mL/hr      PRN medications  PRN medications: diphenhydrAMINE, HYDROmorphone, magnesium hydroxide, naloxone, ondansetron **OR** ondansetron, polyethylene glycol, traMADol, traMADol     Assessment/Plan  Lorraine Liu is a 53 y.o. female with a past medical history of meningioma and R breast cancer s/p mastectomy with immediate reconstruction via tissue expander placement on 3/19/2021. She presented for further R breast  reconstruction via EMI free flap on 12/12 with Dr. Watkins of Plastic Surgery.     # S/p Right Breast EMI:   - Continue serial flap assessments Q2hour per nursing and Q4hour per plastic surgery team       ·  Assess Doppler pulse at marked site plus flap appearance (color, warmth, turgor)       ·  Nursing to notify plastic surgery team immediately if there are any acute changes          (Including decreased Doppler signal, pallor, temperature change, bleeding, etc.)  - Discontinue monique hugger 12/15  - Daily local wound care/dressing changes per plastic surgery APPs (Xeroform over incision lines)       ·  Monitor surgical sites for S/S of bleeding, infection or dehiscence   - Continue DEBBIE drain care per nursing       ·  Strip drain tubing TID and PRN       ·  Monitor and record output [id number]        ·  Keep drain sites C/D/I with daily drain dressing changes        ·  Call plastics if drain output is >30cc in an hour or greater than 200cc over 8 hours  - Continue incisional wound vac therapy to abdomen x10-14 days post op        ·  Settings: 125 mmHg low continuous suction        ·  Please keep dressing C/D/I, reinforce PRN        ·  Record vac output per nursing q8h       ·  Please alert plastics team with any concerns regarding vac        ·  Plan to transition to Prevena homegoing vac on day of discharge    - Maintain beach chair positioning        ·  Avoid positioning that would apply pressure to flap region or incisions   - Patient to maintain strict bedrest until POD#3   - Maintain [place] while on bedrest with anticipated removal on POD#3 after successfully working with PT/OT  - Regular diet  - Maintain BP of 110/60 minimum to ensure adequate flap perfusion   - Low transfusion threshold to ensure adequate flap perfusion  - Daily ASA started POD #1 and continue x30 days post op (end date 1/12)   - Monitor VS/pulse ox [id number]   - Monitor AM CBC/RFP/Mg     #LUE swelling  - US pending    #LLQ abdomimal pain  -  Controlled with pain medication  - Continue to monitor and re-assess after patient has bowel movement  - After up with PT, consider more aggressive scheduled bowel regimen   - If pain/swelling continues after BM, consider LLQ ultrasound    # Acute postoperative pain  - Well controlled per patient, continue current regimen       ·  Dilaudid IV, wean as tolerated       ·  Scheduled Tylenol 650mg PO [id number]        · Gabapentin 600mg PO BID        · Robaxin 1000mg IV [id number]       · Scopolamine patch [id number]  - IV Toradol held POD#1 due to slightly elevated sCre, currently discontinued 2/2 no IV access  - Bowel regimen with Colace 100 mg PO BID while using narcotics  - IV Zofran PRN nausea due to narcotics   - Pain assessments [id number]     #Leukocytosis  - Likely reactive, continue to monitor on AM labs  - S/p 3 doses post op Ancef    # Anemia   - Incoming HGB on admission at 12.2  - Presently no S/S of bleeding, considered likely due to intraoperative blood loss   - Keep T&S UTD, last obtained 12/14  - Monitor for S/S of bleeding or symptomatic anemia   - Low transfusion threshold to ensure adequate flap perfusion  - Monitor AM CBC     # Hx HTN   - BP currently stable   - Elevated OVN 12/13 (147/63) then stabilized in AM to 115/77. Continue to hold BP meds  - Resume home BP meds (hold Lisinopril/hydrochlorothiazide when able)   - Maintain BP of 110/60 minimum to ensure adequate flap perfusion        ·  Please avoid use of pressors as able               -> Recommended use of dobutamine and norepinephrine as indicated only        ·  For HTN, please avoid hydralazine if possible               -> Prefer use of calcium channel blockers as indicated for HTN (amlodipine)    Prophylaxis:   - DVT: SQ Lovenox on POD#3 (12/15), ASA 81 once daily, SCDs  - Encourage IS x10 every hour while awake   - Bowel regimen: Colace 100mg BID     FEN/GI:   - DC IVMF 12/14  - Regular diet   - Monitor electrolytes and replete PRN   - Bowel  regimen with Colace BID   - IV Zofran PRN N/V       Disposition: Continue care on RNF. Will remain inpatient for continued flap monitoring and vac/drain surveillance postoperatively. Follow up appointment with plastic surgery department to be scheduled closer to anticipated date of discharge.     Patient and plan discussed with Dr. Abrams PADelioC  Plastic and Reconstructive Surgery  Epic chat, Pager [phone], Team phones: [phone]

## 2023-12-16 LAB
ALBUMIN SERPL BCP-MCNC: 2.8 G/DL (ref 3.4–5)
ANION GAP SERPL CALC-SCNC: 13 MMOL/L (ref 10–20)
BUN SERPL-MCNC: 10 MG/DL (ref 6–23)
CALCIUM SERPL-MCNC: 7.8 MG/DL (ref 8.6–10.6)
CHLORIDE SERPL-SCNC: 108 MMOL/L (ref 98–107)
CO2 SERPL-SCNC: 21 MMOL/L (ref 21–32)
CREAT SERPL-MCNC: 0.83 MG/DL (ref 0.5–1.05)
ERYTHROCYTE [DISTWIDTH] IN BLOOD BY AUTOMATED COUNT: 12.8 % (ref 11.5–14.5)
GFR SERPL CREATININE-BSD FRML MDRD: 84 ML/MIN/1.73M*2
GLUCOSE SERPL-MCNC: 109 MG/DL (ref 74–99)
HCT VFR BLD AUTO: 27.4 % (ref 36–46)
HGB BLD-MCNC: 8.7 G/DL (ref 12–16)
MAGNESIUM SERPL-MCNC: 2.23 MG/DL (ref 1.6–2.4)
MCH RBC QN AUTO: 31.4 PG (ref 26–34)
MCHC RBC AUTO-ENTMCNC: 31.8 G/DL (ref 32–36)
MCV RBC AUTO: 99 FL (ref 80–100)
NRBC BLD-RTO: 0.3 /100 WBCS (ref 0–0)
PHOSPHATE SERPL-MCNC: 2.7 MG/DL (ref 2.5–4.9)
PLATELET # BLD AUTO: 225 X10*3/UL (ref 150–450)
POTASSIUM SERPL-SCNC: 4.1 MMOL/L (ref 3.5–5.3)
RBC # BLD AUTO: 2.77 X10*6/UL (ref 4–5.2)
SODIUM SERPL-SCNC: 138 MMOL/L (ref 136–145)
WBC # BLD AUTO: 13.1 X10*3/UL (ref 4.4–11.3)

## 2023-12-16 PROCEDURE — 2500000001 HC RX 250 WO HCPCS SELF ADMINISTERED DRUGS (ALT 637 FOR MEDICARE OP)

## 2023-12-16 PROCEDURE — 99231 SBSQ HOSP IP/OBS SF/LOW 25: CPT | Performed by: STUDENT IN AN ORGANIZED HEALTH CARE EDUCATION/TRAINING PROGRAM

## 2023-12-16 PROCEDURE — 96372 THER/PROPH/DIAG INJ SC/IM: CPT

## 2023-12-16 PROCEDURE — 2500000004 HC RX 250 GENERAL PHARMACY W/ HCPCS (ALT 636 FOR OP/ED)

## 2023-12-16 PROCEDURE — 97161 PT EVAL LOW COMPLEX 20 MIN: CPT | Mod: GP

## 2023-12-16 PROCEDURE — 36415 COLL VENOUS BLD VENIPUNCTURE: CPT | Performed by: PHYSICIAN ASSISTANT

## 2023-12-16 PROCEDURE — 80069 RENAL FUNCTION PANEL: CPT | Performed by: PHYSICIAN ASSISTANT

## 2023-12-16 PROCEDURE — 85027 COMPLETE CBC AUTOMATED: CPT | Performed by: PHYSICIAN ASSISTANT

## 2023-12-16 PROCEDURE — 83735 ASSAY OF MAGNESIUM: CPT | Performed by: PHYSICIAN ASSISTANT

## 2023-12-16 PROCEDURE — 1270000001 HC SEMI-PRIVATE ONCOLOGY ROOM DAILY

## 2023-12-16 PROCEDURE — 1170000001 HC PRIVATE ONCOLOGY ROOM DAILY

## 2023-12-16 PROCEDURE — 2500000001 HC RX 250 WO HCPCS SELF ADMINISTERED DRUGS (ALT 637 FOR MEDICARE OP): Performed by: NURSE PRACTITIONER

## 2023-12-16 PROCEDURE — 99232 SBSQ HOSP IP/OBS MODERATE 35: CPT

## 2023-12-16 PROCEDURE — 2500000004 HC RX 250 GENERAL PHARMACY W/ HCPCS (ALT 636 FOR OP/ED): Performed by: NURSE PRACTITIONER

## 2023-12-16 RX ORDER — ACETAMINOPHEN 500 MG
5 TABLET ORAL NIGHTLY PRN
Status: DISCONTINUED | OUTPATIENT
Start: 2023-12-16 | End: 2023-12-17

## 2023-12-16 RX ORDER — BISACODYL 10 MG/1
10 SUPPOSITORY RECTAL DAILY
Status: DISCONTINUED | OUTPATIENT
Start: 2023-12-16 | End: 2023-12-18 | Stop reason: HOSPADM

## 2023-12-16 RX ORDER — POLYETHYLENE GLYCOL 3350 17 G/17G
17 POWDER, FOR SOLUTION ORAL DAILY
Status: DISCONTINUED | OUTPATIENT
Start: 2023-12-17 | End: 2023-12-18 | Stop reason: HOSPADM

## 2023-12-16 RX ADMIN — DOCUSATE SODIUM 100 MG: 100 CAPSULE, LIQUID FILLED ORAL at 20:50

## 2023-12-16 RX ADMIN — ENOXAPARIN SODIUM 40 MG: 100 INJECTION SUBCUTANEOUS at 15:18

## 2023-12-16 RX ADMIN — METHOCARBAMOL 500 MG: 500 TABLET ORAL at 05:59

## 2023-12-16 RX ADMIN — DOCUSATE SODIUM 100 MG: 100 CAPSULE, LIQUID FILLED ORAL at 08:42

## 2023-12-16 RX ADMIN — TRAMADOL HYDROCHLORIDE 100 MG: 50 TABLET, COATED ORAL at 10:26

## 2023-12-16 RX ADMIN — Medication 5 MG: at 20:50

## 2023-12-16 RX ADMIN — METHOCARBAMOL 500 MG: 500 TABLET ORAL at 11:06

## 2023-12-16 RX ADMIN — ASPIRIN 81 MG: 81 TABLET, COATED ORAL at 08:42

## 2023-12-16 RX ADMIN — TRAMADOL HYDROCHLORIDE 100 MG: 50 TABLET, COATED ORAL at 20:55

## 2023-12-16 RX ADMIN — ACETAMINOPHEN 650 MG: 325 TABLET ORAL at 05:59

## 2023-12-16 RX ADMIN — ACETAMINOPHEN 650 MG: 325 TABLET ORAL at 17:33

## 2023-12-16 RX ADMIN — METHOCARBAMOL 500 MG: 500 TABLET ORAL at 00:08

## 2023-12-16 RX ADMIN — GABAPENTIN 600 MG: 300 CAPSULE ORAL at 05:59

## 2023-12-16 RX ADMIN — GABAPENTIN 600 MG: 300 CAPSULE ORAL at 17:33

## 2023-12-16 RX ADMIN — ACETAMINOPHEN 650 MG: 325 TABLET ORAL at 00:08

## 2023-12-16 RX ADMIN — POLYETHYLENE GLYCOL 3350 17 G: 17 POWDER, FOR SOLUTION ORAL at 08:45

## 2023-12-16 RX ADMIN — ACETAMINOPHEN 650 MG: 325 TABLET ORAL at 11:06

## 2023-12-16 RX ADMIN — MAGNESIUM HYDROXIDE 10 ML: 400 SUSPENSION ORAL at 08:45

## 2023-12-16 RX ADMIN — METHOCARBAMOL 500 MG: 500 TABLET ORAL at 17:33

## 2023-12-16 ASSESSMENT — COGNITIVE AND FUNCTIONAL STATUS - GENERAL
TOILETING: A LITTLE
DAILY ACTIVITIY SCORE: 12
CLIMB 3 TO 5 STEPS WITH RAILING: A LOT
STANDING UP FROM CHAIR USING ARMS: A LOT
TURNING FROM BACK TO SIDE WHILE IN FLAT BAD: A LOT
HELP NEEDED FOR BATHING: A LITTLE
DRESSING REGULAR LOWER BODY CLOTHING: A LITTLE
WALKING IN HOSPITAL ROOM: A LITTLE
DRESSING REGULAR LOWER BODY CLOTHING: A LOT
TOILETING: TOTAL
DAILY ACTIVITIY SCORE: 20
EATING MEALS: A LITTLE
CLIMB 3 TO 5 STEPS WITH RAILING: A LOT
PERSONAL GROOMING: A LOT
WALKING IN HOSPITAL ROOM: A LITTLE
DRESSING REGULAR UPPER BODY CLOTHING: A LOT
MOVING FROM LYING ON BACK TO SITTING ON SIDE OF FLAT BED WITH BEDRAILS: A LOT
DRESSING REGULAR UPPER BODY CLOTHING: A LITTLE
TURNING FROM BACK TO SIDE WHILE IN FLAT BAD: TOTAL
STANDING UP FROM CHAIR USING ARMS: A LITTLE
MOVING TO AND FROM BED TO CHAIR: A LITTLE
MOVING FROM LYING ON BACK TO SITTING ON SIDE OF FLAT BED WITH BEDRAILS: A LITTLE
MOBILITY SCORE: 10
MOVING TO AND FROM BED TO CHAIR: A LOT
MOBILITY SCORE: 17
MOVING TO AND FROM BED TO CHAIR: A LOT
HELP NEEDED FOR BATHING: A LOT
MOBILITY SCORE: 13
TURNING FROM BACK TO SIDE WHILE IN FLAT BAD: A LITTLE
CLIMB 3 TO 5 STEPS WITH RAILING: A LOT
MOVING FROM LYING ON BACK TO SITTING ON SIDE OF FLAT BED WITH BEDRAILS: A LOT
STANDING UP FROM CHAIR USING ARMS: TOTAL
WALKING IN HOSPITAL ROOM: A LOT

## 2023-12-16 ASSESSMENT — PAIN SCALES - GENERAL
PAINLEVEL_OUTOF10: 3
PAINLEVEL_OUTOF10: 7
PAINLEVEL_OUTOF10: 3
PAINLEVEL_OUTOF10: 3
PAINLEVEL_OUTOF10: 9
PAINLEVEL_OUTOF10: 4

## 2023-12-16 ASSESSMENT — PAIN - FUNCTIONAL ASSESSMENT
PAIN_FUNCTIONAL_ASSESSMENT: 0-10

## 2023-12-16 ASSESSMENT — PAIN DESCRIPTION - LOCATION: LOCATION: SHOULDER

## 2023-12-16 ASSESSMENT — PAIN DESCRIPTION - ORIENTATION: ORIENTATION: LEFT

## 2023-12-16 ASSESSMENT — ACTIVITIES OF DAILY LIVING (ADL): ADL_ASSISTANCE: INDEPENDENT

## 2023-12-16 NOTE — CARE PLAN
Problem: Pain - Adult  Goal: Verbalizes/displays adequate comfort level or baseline comfort level  Outcome: Progressing   The patient's goals for the shift include  remain safe throughout shift    The clinical goals for the shift include good pain control; breast dopplers are WNL

## 2023-12-16 NOTE — CARE PLAN
The patient's goals for the shift include  good pain control; breast dopplers are WNL    The clinical goals for the shift include good pain control; breast dopplers are WNL    Problem: Pain - Adult  Goal: Verbalizes/displays adequate comfort level or baseline comfort level  Outcome: Progressing     Problem: Safety - Adult  Goal: Free from fall injury  Outcome: Progressing     Problem: Discharge Planning  Goal: Discharge to home or other facility with appropriate resources  Outcome: Progressing     Problem: Chronic Conditions and Co-morbidities  Goal: Patient's chronic conditions and co-morbidity symptoms are monitored and maintained or improved  Outcome: Progressing     Problem: Daily Care  Goal: Daily care needs are met  Outcome: Progressing     Problem: Psychosocial Needs  Goal: Demonstrates ability to cope with hospitalization/illness  Outcome: Progressing  Goal: Collaborate with me, my family, and caregiver to identify my specific goals  Outcome: Progressing     Problem: Skin  Goal: Decreased wound size/increased tissue granulation at next dressing change  Outcome: Progressing  Goal: Participates in plan/prevention/treatment measures  Outcome: Progressing  Goal: Prevent/manage excess moisture  Outcome: Progressing  Goal: Prevent/minimize sheer/friction injuries  Outcome: Progressing  Goal: Promote/optimize nutrition  Outcome: Progressing  Goal: Promote skin healing  Outcome: Progressing

## 2023-12-16 NOTE — PROGRESS NOTES
Physical Therapy    Physical Therapy Evaluation    Patient Name: Lorraine Liu  MRN: [id number]  Today's Date: 12/16/2023   Time Calculation  Start Time: 1011  Stop Time: 1040  Time Calculation (min): 29 min    Assessment/Plan   PT Assessment  PT Assessment Results: Decreased strength, Decreased endurance, Impaired balance, Decreased mobility, Pain  Rehab Prognosis: Good  Evaluation/Treatment Tolerance: Patient limited by pain  Medical Staff Made Aware: Yes  Strengths: Ability to acquire knowledge, Attitude of self, Premorbid level of function, Support of extended family/friends  End of Session Communication: Bedside nurse  Assessment Comment: Pt. is a 53 yof that presents following R breast reconstruction via EMI free flap on 12/12. Pt. presents with impairments including increased  abdominal pain, decreased functional strength, decreased activity tolerance/endurance, impaired balance, and increased difficulty with functional mobility compared to baseline level of function. Pt. will benefit from skilled PT intervention while inpatient to address the above deficits and maximize return to PLOF.   End of Session Patient Position: Up in chair, Alarm off, caregiver present (RN present in room, call light in reach)    IP OR SWING BED PT PLAN  Inpatient or Swing Bed: Inpatient  PT Plan  Treatment/Interventions: Bed mobility, Transfer training, Gait training, Stair training, Balance training, Strengthening, Endurance training, Range of motion, Therapeutic exercise, Therapeutic activity, Home exercise program, Postural re-education  PT Plan: Skilled PT  PT Frequency: 5 times per week  PT Discharge Recommendations: Low intensity level of continued care (Pt. significantly limited by pain this date. Anticipate once pain is better controlled that pt. will benefit from low intensity PT with family assist upon d/c. Will continue to follow pt.'s progress closely to determine d/c needs)  Equipment Recommended upon Discharge:  Wheeled walker  PT Recommended Transfer Status: Assistive device, Assist x1  PT - OK to Discharge: Yes (Eval complete, refer to dispo)      Subjective   General Visit Information:  General  Reason for Referral: s/p R breast reconstruction via EMI free flap on 12/12  Past Medical History Relevant to Rehab: meningioma and R breast cancer s/p mastectomy with immediate reconstruction via tissue expander placement on 3/19/2021.  Family/Caregiver Present: No  Prior to Session Communication: Bedside nurse (RN present during session)  Patient Position Received: Bed, 3 rail up, Alarm off, not on at start of session  Preferred Learning Style: verbal, visual  General Comment: Pt. received sitting up in bed, pleasant and agreeable to participate in session.    Home Living:  Home Living  Type of Home: House  Lives With: Spouse, Adult children (Pt. reports aunt is a retired nurse and will be staying with pt.)  Home Adaptive Equipment: None  Home Layout: 1/2 bath on main level, Bed/bath upstairs  Home Access: Stairs to enter with rails  Entrance Stairs-Number of Steps: 3  Bathroom Shower/Tub: Walk-in shower (upstairs)  Bathroom Equipment: None  Home Living Comments: Pt. reports able to stay on main level with access to 1/2 bath if needed    Prior Level of Function:  Prior Function Per Pt/Caregiver Report  Level of [place]: Independent with ADLs and functional transfers, Independent with homemaking with ambulation  ADL Assistance: Independent  Homemaking Assistance: Independent  Ambulatory Assistance: Independent  Vocational: Works at home ()  Prior Function Comments: no hx of falls    Precautions:  Precautions  Medical Precautions: Abdominal precautions, Fall precautions  Post-Surgical Precautions: Abdominal surgery precautions  Precautions Comment: Maintain beach chair positioning    Objective   Pain:  Pain Assessment  Pain Assessment: 0-10  Pain Score: 3  Pain Type: Surgical pain  Pain Location: Abdomen  Pain  Orientation: Lower  Pain Interventions:  (RN present during session to administer meds)    Cognition:  Cognition  Overall Cognitive Status: Within Functional Limits  Orientation Level: Oriented X4    General Assessments:  Activity Tolerance  Endurance: Tolerates 10 - 20 min exercise with multiple rests  Activity Tolerance Comments: Pt. tolerated ambulating short distance within room, limited by pain    Sensation  Light Touch:  (Pt. endorsing N/T in L toes, reports MD aware)    Static Sitting Balance  Static Sitting-Comment/Number of Minutes: CGAx1  Dynamic Sitting Balance  Dynamic Sitting-Comments: CGAx1'    Static Standing Balance  Static Standing-Comment/Number of Minutes: minAx1 with FWW  Dynamic Standing Balance  Dynamic Standing-Comments: minAx1 with FWW    Functional Assessments:  Bed Mobility  Bed Mobility: Yes  Bed Mobility 1  Bed Mobility 1: Supine to sitting  Level of Assistance 1: Moderate assistance, Moderate verbal cues  Bed Mobility Comments 1: HOB elevated significantly, assist to advance LEs and upright trunk. Limited by pain    Transfers  Transfer: Yes  Transfer 1  Transfer From 1: Bed to  Transfer to 1: Stand  Technique 1: Sit to stand  Transfer Device 1: Walker  Transfer Level of Assistance 1: Moderate assistance, Moderate verbal cues  Trials/Comments 1: bed elevated to improve ease with transfer, cues for proper UE placement and technique    Transfers 2  Transfer From 2: Stand to  Transfer to 2: Chair with arms  Technique 2: Stand to sit  Transfer Device 2: Walker  Transfer Level of Assistance 2: Moderate assistance, Moderate verbal cues  Trials/Comments 2: Cues to reach posteriorly to maximize safety with descent    Ambulation/Gait Training  Ambulation/Gait Training Performed: Yes  Ambulation/Gait Training 1  Surface 1: Level tile  Device 1: Rolling walker  Assistance 1: Minimum assistance, Minimal verbal cues  Quality of Gait 1: Forward flexed posture, Decreased step length (increased trunk  flexion 2/2 precautions, decreased tequila, increased hesitation and time for change in directions)  Comments/Distance (ft) 1: 10 ft (Cues for AD management and sequencing, brief standing rest breaks provided 2/2 pain)    Stairs  Stairs: No    Extremity/Trunk Assessments:  RLE   RLE : Within Functional Limits  LLE   LLE : Within Functional Limits    Outcome Measures:  [facility] Basic Mobility  Turning from your back to your side while in a flat bed without using bedrails: A lot  Moving from lying on your back to sitting on the side of a flat bed without using bedrails: A lot  Moving to and from bed to chair (including a wheelchair): A lot  Standing up from a chair using your arms (e.g. wheelchair or bedside chair): A lot  To walk in hospital room: A little  Climbing 3-5 steps with railing: A lot  Basic Mobility - Total Score: 13    Encounter Problems       Encounter Problems (Active)       Balance       Patient to demonstrate Edward static and dynamic standing balance with LRAD, no LOB with change of directions, no hesitancy, appropriate CORRINA and sequencing to complete functional task.        Start:  12/16/23    Expected End:  12/30/23               Mobility       STG - Patient will ambulate >/= 75 ft Edward with LRAD       Start:  12/16/23    Expected End:  12/30/23            Patient to ascend/descend >/= 3 stairs with HR and CGAx1 to demo ability to safely traverse [id number]        Start:  12/16/23    Expected End:  12/30/23               Pain - Adult          Transfers       STG - Patient will perform bed mobility Edward       Start:  12/16/23    Expected End:  12/30/23            STG - Patient will transfer sit to and from stand Edward with LRAD       Start:  12/16/23    Expected End:  12/30/23                   Education Documentation  Precautions, taught by Ngoc Russell PT at 12/16/2023  2:01 PM.  Learner: Patient  Readiness: Acceptance  Method: Explanation, Demonstration  Response: Verbalizes Understanding    Mobility  Training, taught by Ngoc Russell PT at 12/16/2023  2:01 PM.  Learner: Patient  Readiness: Acceptance  Method: Explanation, Demonstration  Response: Verbalizes Understanding    Education Comments  No comments found.          12/16/23 at 2:04 PM - Ngoc Russell PT

## 2023-12-16 NOTE — PROGRESS NOTES
Acute Pain Service  Postop Pain HPI -   Palliative: relieved with IV analgesics and regional local anesthetics  Provocative: movement  Quality:  burning and aching  Radiation:  none  Severity:  4/10  Timing: constant    24-HOUR OPIOID CONSUMPTION:  100 mg tramadol    Scheduled medications  acetaminophen, 650 mg, oral, q6h VISH  aspirin, 81 mg, oral, Daily  docusate sodium, 100 mg, oral, BID  enoxaparin, 40 mg, subcutaneous, Daily  gabapentin, 600 mg, oral, q12h  [Held by provider] lisinopril 20 mg, hydroCHLOROthiazide 12.5 mg for Zestoretic/Prinizide, , oral, Daily  methocarbamol, 500 mg, oral, q6h VISH  scopolamine, 1 patch, transdermal, q72h      Continuous medications  ropivacaine (PF) in NS cmpd, 20 mL/hr      PRN medications  PRN medications: diphenhydrAMINE, HYDROmorphone, magnesium hydroxide, naloxone, ondansetron **OR** ondansetron, polyethylene glycol, traMADol, traMADol     Physical Exam:  Constitutional:  no distress, alert and cooperative  Eyes: clear sclera  Head/Neck: No apparent injury, trachea midline  Respiratory/Thorax: Patent airways, thorax symmetric, breathing comfortably  Cardiovascular: no pitting edema  Gastrointestinal: Nondistended  Musculoskeletal: ROM intact  Extremities: no clubbing  Neurological: alert, sánchez x4  Psychological: Appropriate affect    Results for orders placed or performed during the hospital encounter of 12/12/23 (from the past 24 hour(s))   CBC   Result Value Ref Range    WBC 13.1 (H) 4.4 - 11.3 x10*3/uL    nRBC 0.3 (H) 0.0 - 0.0 /100 WBCs    RBC 2.77 (L) 4.00 - 5.20 x10*6/uL    Hemoglobin 8.7 (L) 12.0 - 16.0 g/dL    Hematocrit 27.4 (L) 36.0 - 46.0 %    MCV 99 80 - 100 fL    MCH 31.4 26.0 - 34.0 pg    MCHC 31.8 (L) 32.0 - 36.0 g/dL    RDW 12.8 11.5 - 14.5 %    Platelets 225 150 - 450 x10*3/uL   Renal Function Panel   Result Value Ref Range    Glucose 109 (H) 74 - 99 mg/dL    Sodium 138 136 - 145 mmol/L    Potassium 4.1 3.5 - 5.3 mmol/L    Chloride 108 (H) 98 - 107 mmol/L     Bicarbonate 21 21 - 32 mmol/L    Anion Gap 13 10 - 20 mmol/L    Urea Nitrogen 10 6 - 23 mg/dL    Creatinine 0.83 0.50 - 1.05 mg/dL    eGFR 84 >60 mL/min/1.73m*2    Calcium 7.8 (L) 8.6 - 10.6 mg/dL    Phosphorus 2.7 2.5 - 4.9 mg/dL    Albumin 2.8 (L) 3.4 - 5.0 g/dL   Magnesium   Result Value Ref Range    Magnesium 2.23 1.60 - 2.40 mg/dL          Lorraine Liu is a 53 y.o. year old female patient who presents for release contracture torso right, removal tissue expander right, reconstruction breast deep inferior epigastric  flap with Dr. Watkins. Acute Pain consulted for block for postoperative pain control.      PLAN  - erector spinae plane nerve blocks with catheters performed preoperatively 12/12   - Catheters removed this morning  - Acute pain service will sign off    Acute Pain Service Resident  pg [phone]

## 2023-12-16 NOTE — PROGRESS NOTES
"  Department of Plastic and Reconstructive Surgery   Plastic Surgery Progress Note    Patient Name: Lorraine Liu  MRN: [id number]  Date:  12/16/23     Subjective  Resting comfortably in bed. No acute concerns overnight. Pain well controlled. Reports improved abdominal discomfort with palpation, however still present. Denies having a bowel movement overnight, with last BM occurring on Monday of this week. Denies any fever, chills, night sweats, CP, SOB, palpitations, nausea, vomiting, or abdominal pain/discomfort.     Overnight Events  NAOE    Objective    Vital Signs  /83   Pulse 93   Temp 36.7 °C (98.1 °F) (Temporal)   Resp 18   Ht 1.651 m (5' 5\")   Wt 93.3 kg (205 lb 11 oz)   LMP  (LMP Unknown) Comment: urine pregnancy negative  SpO2 93%   BMI 34.23 kg/m²      Physical Exam   Constitutional: Alert, calm, cooperative, and pleasant. NAD  Eyes: EOMI, clear sclera .  ENMT: Moist mucous membranes, no apparent injuries or lesions.  Head/Neck: NCAT  Cardiovascular: Normal rate and regular rhythm. 2+ equal pulses of the distal extremities.  Respiratory/Thorax: CTAB, regular respirations on RA. Good symmetric chest expansion.   Gastrointestinal: Abdomen taut, non-distended, appropriate generalized tenderness. Tender to palpation to LLQ with edema. Incisional wound vac to pfannenstiel incision without alarms for leak or malfunction. Holding appropriate suction at -125mmHg. Tissue surrounding vac dressing without erythema or edema. Aquacel AG covering umbilicus, c/d/I, pain block in place   Genitourinary: Voiding via indwelling de anda catheter. Clear, yellow output  Extremities: Left forearm and hand edema, no firmness or fluctuance. Ecchymosis to left forearm 2/2 previous IV infiltrate  Psychological: Appropriate mood and behavior.   Breast: EMI free flap recipient site to right breast which is appropriate color, without pallor. Warm to touch, soft. Flap approximated intact sutures and incision lines dressed " with Xeroform dressing, no evidence of bleeding or dehiscence. Mild puckering noted to superior medial aspect of incision. Intact venous Doppler pulse at lateral and medial aspects of breast flap tissue. Appropriately tender to palpation at flap and surrounding tissue. 2+ Cap refill at the flap. No areas of surrounding edema, fluid collection, induration, drainage or bleeding. x1 DEBBIE drain to lateral breast with serosanguinous output.    Diagnostics   Results for orders placed or performed during the hospital encounter of 12/12/23 (from the past 24 hour(s))   CBC   Result Value Ref Range    WBC 13.1 (H) 4.4 - 11.3 x10*3/uL    nRBC 0.3 (H) 0.0 - 0.0 /100 WBCs    RBC 2.77 (L) 4.00 - 5.20 x10*6/uL    Hemoglobin 8.7 (L) 12.0 - 16.0 g/dL    Hematocrit 27.4 (L) 36.0 - 46.0 %    MCV 99 80 - 100 fL    MCH 31.4 26.0 - 34.0 pg    MCHC 31.8 (L) 32.0 - 36.0 g/dL    RDW 12.8 11.5 - 14.5 %    Platelets 225 150 - 450 x10*3/uL   Renal Function Panel   Result Value Ref Range    Glucose 109 (H) 74 - 99 mg/dL    Sodium 138 136 - 145 mmol/L    Potassium 4.1 3.5 - 5.3 mmol/L    Chloride 108 (H) 98 - 107 mmol/L    Bicarbonate 21 21 - 32 mmol/L    Anion Gap 13 10 - 20 mmol/L    Urea Nitrogen 10 6 - 23 mg/dL    Creatinine 0.83 0.50 - 1.05 mg/dL    eGFR 84 >60 mL/min/1.73m*2    Calcium 7.8 (L) 8.6 - 10.6 mg/dL    Phosphorus 2.7 2.5 - 4.9 mg/dL    Albumin 2.8 (L) 3.4 - 5.0 g/dL   Magnesium   Result Value Ref Range    Magnesium 2.23 1.60 - 2.40 mg/dL     Vascular US upper extremity venous duplex left    Result Date: 12/15/2023            [place] [id number]   Tel [phone] and Fax [phone]  Vascular Lab Report VASC US UPPER EXTREMITY VENOUS DUPLEX LEFT  Patient Name:      CARLOS Barron Physician: [id number] Tiana Lewis MD Study Date:        12/15/2023          Ordering           [id number] ROLAND QUINTERO                                         Physician: MRN/PID:           [id number]            Technologist:      Arely Melgoza RVT Accession#:        [id number]        Technologist 2: Date of Birth/Age: 1970 / 53      Encounter#:        [id number]                    years Gender:            F Admission Status:  Inpatient           Location           [facility]                                        Performed:  Diagnosis/ICD: Left arm swelling-M79.89  CONCLUSIONS: Right Upper Venous: The subclavian vein demonstrates a normal spontaneous and phasic flow. Left Upper Venous: No evidence of acute deep vein thrombus visualized in the left upper extremity.  Imaging & Doppler Findings:  Right             Flow Subclavian Spontaneous/Phasic  Left                Compress Thrombus        Flow Internal Jugular      Yes      None   Spontaneous/Phasic Subclavian            Yes      None Subclavian Proximal   Yes      None   Spontaneous/Phasic Subclavian Mid        Yes      None Subclavian Distal     Yes      None Axillary              Yes      None   Spontaneous/Phasic Brachial              Yes      None Cephalic              Yes      None Basilic               Yes      None  [id number] Tiana Lewis MD Electronically signed by [id number] Tiana Lewis MD on 12/15/2023 at 5:26:24 PM  ** Final **     Current Medications  Scheduled medications  acetaminophen, 650 mg, oral, q6h VISH  aspirin, 81 mg, oral, Daily  docusate sodium, 100 mg, oral, BID  enoxaparin, 40 mg, subcutaneous, Daily  gabapentin, 600 mg, oral, q12h  [Held by provider] lisinopril 20 mg, hydroCHLOROthiazide 12.5 mg for Zestoretic/Prinizide, , oral, Daily  methocarbamol, 500 mg, oral, q6h VISH  scopolamine, 1 patch, transdermal, q72h      Continuous medications  ropivacaine (PF) in NS cmpd, 20 mL/hr      PRN medications  PRN medications: diphenhydrAMINE, HYDROmorphone, magnesium hydroxide, naloxone, ondansetron **OR** ondansetron, polyethylene glycol, traMADol, traMADol      Assessment  Lorraine Liu is a 53 y.o. female with a past medical history of meningioma and R breast cancer s/p mastectomy with immediate reconstruction via tissue expander placement on 3/19/2021. S/P R breast reconstruction via EMI free flap on 12/12 with Dr. Watkins of Plastic Surgery.     Plan/Recommendations  # S/p Right Breast EMI:   - Continue serial flap assessments Q4 hr per nursing with interval checks per plastic surgery team       ·  Assess Doppler pulse at marked site plus flap appearance (color, warmth, turgor)       ·  Nursing to notify plastic surgery team immediately if there are any acute changes          (Including decreased Doppler signal, pallor, temperature change, bleeding, etc.)  - May leave incisions BIANKA       ·  Monitor surgical sites for S/S of bleeding, infection or dehiscence   - Continue DEBBIE drain care per nursing       ·  Strip drain tubing TID and PRN       ·  Monitor and record output [id number]        ·  Keep drain sites C/D/I with daily drain dressing changes        ·  Call plastics if drain output is >30cc in an hour or greater than 200cc over 8 hours  - Continue incisional wound vac therapy to abdomen x10-14 days post op        ·  Settings: 125 mmHg low continuous suction        ·  Please keep dressing C/D/I, reinforce PRN        ·  Record vac output per nursing q8h       ·  Please alert plastics team with any concerns regarding vac        ·  Plan to transition to Prevena homegoing vac on day of discharge    - Maintain beach chair positioning        ·  Avoid positioning that would apply pressure to flap region or incisions   - Patient to maintain strict bedrest until POD#3   - Maintain Horvath until patient comfortably with ambulation/removal, plan for PT to work with her today  - Regular diet  - Maintain BP of 110/60 minimum to ensure adequate flap perfusion   - Low transfusion threshold to ensure adequate flap perfusion  - Daily ASA started POD #1 and continue x30 days post op  (end date 1/12)   - Monitor VS/pulse ox [id number]   - Monitor AM CBC/RFP/Mg      #LUE swelling  - US negative     #LLQ abdomimal pain  - Controlled with pain medication  - Continue to monitor and re-assess after patient has bowel movement  - After up with PT, consider more aggressive scheduled bowel regimen   - If pain/swelling continues after BM, consider LLQ ultrasound     # Acute postoperative pain  - Well controlled per patient, continue current regimen       ·  Dilaudid IV, wean as tolerated       ·  Scheduled Tylenol 650mg PO [id number]        · Gabapentin 600mg PO BID        · Robaxin 1000mg IV [id number]       · Scopolamine patch [id number]  - IV Toradol held POD#1 due to slightly elevated sCre, currently discontinued 2/2 no IV access  - Bowel regimen with Colace 100 mg PO BID while using narcotics  - IV Zofran PRN nausea due to narcotics   - Pain assessments [id number]      #Leukocytosis  - Likely reactive, continue to monitor on AM labs  - S/p 3 doses post op Ancef     # Anemia   - Incoming HGB on admission at 12.2  - Presently no S/S of bleeding, considered likely due to intraoperative blood loss   - Keep T&S UTD, last obtained 12/14  - Monitor for S/S of bleeding or symptomatic anemia   - Low transfusion threshold to ensure adequate flap perfusion  - Monitor AM CBC      # Hx HTN   - BP currently stable   - Elevated OVN 12/15 (142/83) then stabilized in AM.   - Continue to hold home BP meds (Lisinopril, hydrochlorothiazide), likely until discharge unless consistently hypertensive  - Maintain BP of 110/60 minimum to ensure adequate flap perfusion        ·  Please avoid use of pressors as able               -> Recommended use of dobutamine and norepinephrine as indicated only        ·  For HTN, please avoid hydralazine if possible               -> Prefer use of calcium channel blockers as indicated for HTN (amlodipine)     Prophylaxis:   - DVT: SQ Lovenox on POD#3 (12/15), ASA 81 once daily, SCDs  - Encourage IS x10  every hour while awake   - Bowel regimen: Colace 100mg BID      FEN/GI:   - DC IVMF 12/14  - Regular diet   - Monitor electrolytes and replete PRN   - Bowel regimen with Colace BID   - IV Zofran PRN N/V    Dispo: Continue care on RNF. Will remain inpatient for continued flap monitoring and vac/drain surveillance postoperatively. Follow up appointment with plastic surgery department to be scheduled closer to anticipated date of discharge.   Patient and plan discussed with Dr. Theodore, DEEPAK-CNP   Plastic and Reconstructive Surgery   [place]  Pager #[phone]  Team phones: [phone]

## 2023-12-16 NOTE — SIGNIFICANT EVENT
Lorraine Liu is a 53 y.o. female with a past medical history of meningioma and R breast cancer s/p mastectomy with immediate reconstruction via tissue expander placement on 3/19/2021. S/P R breast reconstruction via EMI free flap on 12/12 with Dr. Watkins of Plastic Surgery.     Subjective:  Resting comfortably in bed, pain to LLQ abdomen improved but still present upon palpation, pain well controlled with oral pain meds, + flatus no BM, US of left forearm negative for DVT, PT postponed patient off floor, will attempt tomorrow. Denies any fever, chills, night sweats, CP, SOB, palpitations, nausea, vomiting, diarrhea, constipation, dysuria, hematuria, hematochezia, hematemesis, flank pain.     Objective:  Physical Exam  Constitutional: NAD  Eyes: EOMI, clear sclera .  ENMT: Moist mucous membranes, no apparent injuries or lesions.  Head/Neck: NCAT  Cardiovascular: Normal rate and regular rhythm. 2+ equal pulses of the distal extremities.  Respiratory/Thorax: CTAB, regular respirations on RA. Good symmetric chest expansion.   Gastrointestinal: Abdomen taut, non-distended, appropriate generalized tenderness. Tender to palpation to LLQ with edema. Incisional wound vac to pfannenstiel incision without alarms for leak or malfunction. Holding appropriate suction at -125mmHg. Tissue surrounding vac dressing without erythema or edema. Aquacel AG covering umbilicus, c/d/I, pain block in place   Genitourinary: Voiding via indwelling de anda catheter. Clear, yellow output  Extremities: Left forearm with IV infiltration, no firmness or fluctuance. Ecchymosis to left forearm, IV removed. No peripheral edema. Moving all 4 extremities actively.   Neurological: A&Ox3.   Psychological: Appropriate mood and behavior.   Breast: EMI free flap recipient site to right breast which is appropriate color, without pallor. Warm to touch, soft. Flap approximated intact sutures and incision lines dressed with Xeroform dressing, no evidence of  bleeding or dehiscence. Mild puckering noted to superior medial aspect of incision. Intact venous Doppler pulse at lateral and medial aspects of breast flap tissue. Appropriately tender to palpation at flap and surrounding tissue. 2+ Cap refill at the flap. No areas of surrounding edema, fluid collection, induration, drainage or bleeding. x1 DEBBIE drain to lateral breast with serosanguinous output.     # S/p Right Breast EMI:   - Continue serial flap assessments Q2hour per nursing and Q4hour per plastic surgery team       ·  Assess Doppler pulse at marked site plus flap appearance (color, warmth, turgor)       ·  Nursing to notify plastic surgery team immediately if there are any acute changes          (Including decreased Doppler signal, pallor, temperature change, bleeding, etc.)  - [place] erickson discontinued  - Daily local wound care/dressing changes per plastic surgery APPs (Xeroform over incision lines)       ·  Monitor surgical sites for S/S of bleeding, infection or dehiscence   - Continue DEBBIE drain care per nursing       ·  Strip drain tubing TID and PRN       ·  Monitor and record output [id number]        ·  Keep drain sites C/D/I with daily drain dressing changes        ·  Call plastics if drain output is >30cc in an hour or greater than 200cc over 8 hours  - Continue incisional wound vac therapy to abdomen x10-14 days post op        ·  Settings: 125 mmHg low continuous suction        ·  Please keep dressing C/D/I, reinforce PRN        ·  Record vac output per nursing q8h       ·  Please alert plastics team with any concerns regarding vac        ·  Plan to transition to Prevena homegoing vac on day of discharge    - Maintain beach chair positioning        ·  Avoid positioning that would apply pressure to flap region or incisions   - Patient to maintain strict bedrest until POD#3   - Maintain Horvath while on bedrest with anticipated removal on POD#4 or 5 after successfully working with PT/OT  - Regular diet  -  Maintain BP of 110/60 minimum to ensure adequate flap perfusion   - Low transfusion threshold to ensure adequate flap perfusion  - SQ Heparin [id number] resumed postoperatively, transition to Lovenox on POD#3 (12/15)   - Daily ASA started POD #1 and continue x30 days post op (end date 1/12)   - Monitor VS/pulse ox [id number]   - Monitor AM CBC/RFP/Mg      #IV infiltration:  - IV removed, improved ecchymosis  - US to left forearm negative     # Acute postoperative pain  - Well controlled per patient, continue current regimen       ·  Dilaudid IV       ·  Scheduled Tylenol 650mg PO [id number]        · Gabapentin 600mg PO BID        · Robaxin 1000mg IV [id number]       · Scopolamine patch [id number]  - Bowel regimen with Colace 100 mg PO BID while using narcotics, Miralax prn  - IV Zofran PRN nausea due to narcotics   - Pain assessments C2mosoz      # Anemia   - Incoming HGB on admission at 12.2  - H&H on AM labs at 8.6/27.8  - Presently no S/S of bleeding, considered likely due to intraoperative blood loss   - Keep T&S UTD, last obtained 12/14  - Monitor for S/S of bleeding or symptomatic anemia   - Low transfusion threshold to ensure adequate flap perfusion  - Transfuse for HGB <7 per protocol   - Monitor AM CBC      # Hx HTN   - BP currently stable   - Elevated OVN 12/13 (147/63) then stabilized in AM to 115/77. Continue to hold BP meds  - Resume home BP meds (hold Lisinopril/hydrochlorothiazide when able)   - Maintain BP of 110/60 minimum to ensure adequate flap perfusion        ·  Please avoid use of pressors as able               -> Recommended use of dobutamine and norepinephrine as indicated only        ·  For HTN, please avoid hydralazine if possible               -> Prefer use of calcium channel blockers as indicated for HTN (amlodipine)     Prophylaxis:   - DVT: SQ heparin V0omgyh (plan to transition to SQ Lovenox on POD#3), ASA 81 once daily, SCDs  - Encourage IS x10 every hour while awake   - Bowel regimen: Colace 100mg  BID, miralax prn       FEN/GI:   - DC IVMF 12/14  - Regular diet   - Monitor electrolytes and replete PRN   - Bowel regimen with Colace BID   - IV Zofran PRN N/V       Disposition: Continue care on RNF. Will remain inpatient for continued flap monitoring and vac/drain surveillance postoperatively. Follow up appointment with plastic surgery department to be scheduled closer to anticipated date of discharge.     DEEPAK Marlwo-CNP  Plastic and Reconstructive Surgery   Available via pager: [phone] or team phones: [phone]

## 2023-12-17 ENCOUNTER — APPOINTMENT (OUTPATIENT)
Dept: RADIOLOGY | Facility: HOSPITAL | Age: 53
DRG: 585 | End: 2023-12-17
Payer: COMMERCIAL

## 2023-12-17 LAB
ALBUMIN SERPL BCP-MCNC: 3.1 G/DL (ref 3.4–5)
ANION GAP SERPL CALC-SCNC: 12 MMOL/L (ref 10–20)
BUN SERPL-MCNC: 12 MG/DL (ref 6–23)
CALCIUM SERPL-MCNC: 7.9 MG/DL (ref 8.6–10.6)
CHLORIDE SERPL-SCNC: 107 MMOL/L (ref 98–107)
CO2 SERPL-SCNC: 24 MMOL/L (ref 21–32)
CREAT SERPL-MCNC: 0.81 MG/DL (ref 0.5–1.05)
ERYTHROCYTE [DISTWIDTH] IN BLOOD BY AUTOMATED COUNT: 13.1 % (ref 11.5–14.5)
GFR SERPL CREATININE-BSD FRML MDRD: 86 ML/MIN/1.73M*2
GLUCOSE SERPL-MCNC: 111 MG/DL (ref 74–99)
HCT VFR BLD AUTO: 27.5 % (ref 36–46)
HGB BLD-MCNC: 8.7 G/DL (ref 12–16)
MCH RBC QN AUTO: 31.1 PG (ref 26–34)
MCHC RBC AUTO-ENTMCNC: 31.6 G/DL (ref 32–36)
MCV RBC AUTO: 98 FL (ref 80–100)
NRBC BLD-RTO: 0.4 /100 WBCS (ref 0–0)
PHOSPHATE SERPL-MCNC: 3.2 MG/DL (ref 2.5–4.9)
PLATELET # BLD AUTO: 241 X10*3/UL (ref 150–450)
POTASSIUM SERPL-SCNC: 3.9 MMOL/L (ref 3.5–5.3)
RBC # BLD AUTO: 2.8 X10*6/UL (ref 4–5.2)
SODIUM SERPL-SCNC: 139 MMOL/L (ref 136–145)
WBC # BLD AUTO: 12.6 X10*3/UL (ref 4.4–11.3)

## 2023-12-17 PROCEDURE — 96372 THER/PROPH/DIAG INJ SC/IM: CPT

## 2023-12-17 PROCEDURE — 1270000001 HC SEMI-PRIVATE ONCOLOGY ROOM DAILY

## 2023-12-17 PROCEDURE — 85027 COMPLETE CBC AUTOMATED: CPT

## 2023-12-17 PROCEDURE — 2500000001 HC RX 250 WO HCPCS SELF ADMINISTERED DRUGS (ALT 637 FOR MEDICARE OP): Performed by: NURSE PRACTITIONER

## 2023-12-17 PROCEDURE — 74177 CT ABD & PELVIS W/CONTRAST: CPT | Performed by: RADIOLOGY

## 2023-12-17 PROCEDURE — 2500000005 HC RX 250 GENERAL PHARMACY W/O HCPCS

## 2023-12-17 PROCEDURE — 1170000001 HC PRIVATE ONCOLOGY ROOM DAILY

## 2023-12-17 PROCEDURE — 2500000004 HC RX 250 GENERAL PHARMACY W/ HCPCS (ALT 636 FOR OP/ED)

## 2023-12-17 PROCEDURE — 99232 SBSQ HOSP IP/OBS MODERATE 35: CPT

## 2023-12-17 PROCEDURE — 76705 ECHO EXAM OF ABDOMEN: CPT

## 2023-12-17 PROCEDURE — 76705 ECHO EXAM OF ABDOMEN: CPT | Performed by: RADIOLOGY

## 2023-12-17 PROCEDURE — 36415 COLL VENOUS BLD VENIPUNCTURE: CPT

## 2023-12-17 PROCEDURE — 2550000001 HC RX 255 CONTRASTS

## 2023-12-17 PROCEDURE — 2500000001 HC RX 250 WO HCPCS SELF ADMINISTERED DRUGS (ALT 637 FOR MEDICARE OP)

## 2023-12-17 PROCEDURE — 80069 RENAL FUNCTION PANEL: CPT

## 2023-12-17 PROCEDURE — 74177 CT ABD & PELVIS W/CONTRAST: CPT

## 2023-12-17 RX ORDER — ACETAMINOPHEN 500 MG
5 TABLET ORAL ONCE
Status: COMPLETED | OUTPATIENT
Start: 2023-12-17 | End: 2023-12-17

## 2023-12-17 RX ORDER — LIDOCAINE 560 MG/1
1 PATCH PERCUTANEOUS; TOPICAL; TRANSDERMAL DAILY
Status: DISCONTINUED | OUTPATIENT
Start: 2023-12-17 | End: 2023-12-18 | Stop reason: HOSPADM

## 2023-12-17 RX ORDER — ACETAMINOPHEN 500 MG
10 TABLET ORAL NIGHTLY PRN
Status: DISCONTINUED | OUTPATIENT
Start: 2023-12-17 | End: 2023-12-18 | Stop reason: HOSPADM

## 2023-12-17 RX ADMIN — ASPIRIN 81 MG: 81 TABLET, COATED ORAL at 08:24

## 2023-12-17 RX ADMIN — Medication 10 MG: at 20:52

## 2023-12-17 RX ADMIN — METHOCARBAMOL 500 MG: 500 TABLET ORAL at 00:39

## 2023-12-17 RX ADMIN — ACETAMINOPHEN 650 MG: 325 TABLET ORAL at 00:39

## 2023-12-17 RX ADMIN — METHOCARBAMOL 500 MG: 500 TABLET ORAL at 06:28

## 2023-12-17 RX ADMIN — TRAMADOL HYDROCHLORIDE 50 MG: 50 TABLET, COATED ORAL at 08:23

## 2023-12-17 RX ADMIN — DOCUSATE SODIUM 100 MG: 100 CAPSULE, LIQUID FILLED ORAL at 20:53

## 2023-12-17 RX ADMIN — LIDOCAINE 1 PATCH: 4 PATCH TOPICAL at 11:18

## 2023-12-17 RX ADMIN — TRAMADOL HYDROCHLORIDE 100 MG: 50 TABLET, COATED ORAL at 20:53

## 2023-12-17 RX ADMIN — ACETAMINOPHEN 650 MG: 325 TABLET ORAL at 17:32

## 2023-12-17 RX ADMIN — GABAPENTIN 600 MG: 300 CAPSULE ORAL at 06:27

## 2023-12-17 RX ADMIN — GABAPENTIN 600 MG: 300 CAPSULE ORAL at 17:32

## 2023-12-17 RX ADMIN — METHOCARBAMOL 500 MG: 500 TABLET ORAL at 17:32

## 2023-12-17 RX ADMIN — ACETAMINOPHEN 650 MG: 325 TABLET ORAL at 06:27

## 2023-12-17 RX ADMIN — MAGNESIUM HYDROXIDE 10 ML: 400 SUSPENSION ORAL at 15:52

## 2023-12-17 RX ADMIN — Medication 5 MG: at 04:00

## 2023-12-17 RX ADMIN — IOHEXOL 90 ML: 350 INJECTION, SOLUTION INTRAVENOUS at 19:18

## 2023-12-17 RX ADMIN — DOCUSATE SODIUM 100 MG: 100 CAPSULE, LIQUID FILLED ORAL at 08:23

## 2023-12-17 RX ADMIN — ACETAMINOPHEN 650 MG: 325 TABLET ORAL at 11:06

## 2023-12-17 RX ADMIN — ENOXAPARIN SODIUM 40 MG: 100 INJECTION SUBCUTANEOUS at 15:52

## 2023-12-17 RX ADMIN — POLYETHYLENE GLYCOL 3350 17 G: 17 POWDER, FOR SOLUTION ORAL at 08:24

## 2023-12-17 RX ADMIN — METHOCARBAMOL 500 MG: 500 TABLET ORAL at 11:06

## 2023-12-17 RX ADMIN — BISACODYL 10 MG: 10 SUPPOSITORY RECTAL at 08:24

## 2023-12-17 ASSESSMENT — COGNITIVE AND FUNCTIONAL STATUS - GENERAL
STANDING UP FROM CHAIR USING ARMS: A LITTLE
DRESSING REGULAR LOWER BODY CLOTHING: A LITTLE
CLIMB 3 TO 5 STEPS WITH RAILING: A LOT
DAILY ACTIVITIY SCORE: 20
MOVING TO AND FROM BED TO CHAIR: A LITTLE
HELP NEEDED FOR BATHING: A LITTLE
WALKING IN HOSPITAL ROOM: A LITTLE
TURNING FROM BACK TO SIDE WHILE IN FLAT BAD: A LITTLE
MOVING TO AND FROM BED TO CHAIR: A LITTLE
MOBILITY SCORE: 17
WALKING IN HOSPITAL ROOM: A LITTLE
TURNING FROM BACK TO SIDE WHILE IN FLAT BAD: A LITTLE
STANDING UP FROM CHAIR USING ARMS: A LITTLE
MOBILITY SCORE: 17
DRESSING REGULAR LOWER BODY CLOTHING: A LITTLE
HELP NEEDED FOR BATHING: A LITTLE
CLIMB 3 TO 5 STEPS WITH RAILING: A LOT
DRESSING REGULAR UPPER BODY CLOTHING: A LITTLE
TOILETING: A LITTLE
MOVING FROM LYING ON BACK TO SITTING ON SIDE OF FLAT BED WITH BEDRAILS: A LITTLE
DAILY ACTIVITIY SCORE: 20
MOVING FROM LYING ON BACK TO SITTING ON SIDE OF FLAT BED WITH BEDRAILS: A LITTLE
TOILETING: A LITTLE
DRESSING REGULAR UPPER BODY CLOTHING: A LITTLE

## 2023-12-17 ASSESSMENT — PAIN - FUNCTIONAL ASSESSMENT
PAIN_FUNCTIONAL_ASSESSMENT: 0-10

## 2023-12-17 ASSESSMENT — PAIN SCALES - GENERAL
PAINLEVEL_OUTOF10: 5 - MODERATE PAIN
PAINLEVEL_OUTOF10: 6
PAINLEVEL_OUTOF10: 5 - MODERATE PAIN
PAINLEVEL_OUTOF10: 7

## 2023-12-17 ASSESSMENT — ACTIVITIES OF DAILY LIVING (ADL): LACK_OF_TRANSPORTATION: NO

## 2023-12-17 ASSESSMENT — PAIN DESCRIPTION - LOCATION: LOCATION: ABDOMEN

## 2023-12-17 NOTE — CARE PLAN
The patient's goals for the shift include having a bowel movement; good pain control; increasing mobility     The clinical goals for the shift include a bowel movement; good pain control; increasing mobility     Over the shift, the patient did not make progress toward the following goals: no bowel movement Barriers to progression include diet,surgery, and lack of movement.  Recommendations to address these barriers include increasing mobility OOB and increasing PO intake; possible suppository in the AM.

## 2023-12-17 NOTE — PROGRESS NOTES
"  Department of Plastic and Reconstructive Surgery   Plastic Surgery Progress Note    Patient Name: Lorraine Liu  MRN: [id number]  Date:  12/17/23     Subjective  Sitting up in chair this AM, reports she did not sleep much d/t left should pain, despite taking melatonin last night. Reports having a large bowel movement this AM, however continues to endorse left side of abdomen feeling taught and tender on palpation, notes that it has improved following bowel movement, but still present. Overall reports feeling better today following bowel movement. Denies any fever, chills, night sweats, CP, SOB, palpitations, nausea, vomiting.     Overnight Events  NAOE, has noted left shoulder pain following removal of nerve block    Objective    Vital Signs  /72 (Patient Position: Sitting)   Pulse 90   Temp 36 °C (96.8 °F) (Temporal)   Resp 18   Ht 1.651 m (5' 5\")   Wt 93.3 kg (205 lb 11 oz)   LMP  (LMP Unknown) Comment: urine pregnancy negative  SpO2 95%   BMI 34.23 kg/m²      Physical Exam   Constitutional: Alert, calm, cooperative, and pleasant. NAD. Sitting up in chair  Eyes: EOMI, clear sclera .  ENMT: Moist mucous membranes, no apparent injuries or lesions.  Head/Neck: NCAT  Cardiovascular: Normal rate and regular rhythm. 2+ equal pulses of the distal extremities.  Respiratory/Thorax: CTAB, regular respirations on RA. Good symmetric chest expansion.   Gastrointestinal: Abdomen taut, improved distention, appropriate generalized tenderness. Tender to palpation to LLQ/LUQ with slight edema. Incisional wound vac to pfannenstiel incision without alarms for leak or malfunction. Holding appropriate suction at -125mmHg. Tissue surrounding vac dressing without erythema or edema. Aquacel AG covering umbilicus, c/d/I, pain block in place   Genitourinary: Voiding via indwelling de anda catheter. Clear, yellow output  Extremities: Left forearm and hand edema, no firmness or fluctuance. Ecchymosis to left forearm 2/2 previous " IV infiltrate. Full ROM intact x4 extremities. LUE with no limitations to ROM  Psychological: Appropriate mood and behavior.   Breast: EMI free flap recipient site to right breast which is appropriate color, without pallor. Warm to touch, soft. Flap approximated intact sutures and incision lines dressed with Xeroform dressing, no evidence of bleeding or dehiscence. Mild puckering noted to superior medial aspect of incision. Intact venous Doppler pulse at lateral and medial aspects of breast flap tissue. Appropriately tender to palpation at flap and surrounding tissue. 2+ Cap refill at the flap. No areas of surrounding edema, fluid collection, induration, drainage or bleeding. x1 DEBBIE drain to lateral breast with serosanguinous output.  Diagnostics   Results for orders placed or performed during the hospital encounter of 12/12/23 (from the past 24 hour(s))   CBC   Result Value Ref Range    WBC 12.6 (H) 4.4 - 11.3 x10*3/uL    nRBC 0.4 (H) 0.0 - 0.0 /100 WBCs    RBC 2.80 (L) 4.00 - 5.20 x10*6/uL    Hemoglobin 8.7 (L) 12.0 - 16.0 g/dL    Hematocrit 27.5 (L) 36.0 - 46.0 %    MCV 98 80 - 100 fL    MCH 31.1 26.0 - 34.0 pg    MCHC 31.6 (L) 32.0 - 36.0 g/dL    RDW 13.1 11.5 - 14.5 %    Platelets 241 150 - 450 x10*3/uL   Renal function panel   Result Value Ref Range    Glucose 111 (H) 74 - 99 mg/dL    Sodium 139 136 - 145 mmol/L    Potassium 3.9 3.5 - 5.3 mmol/L    Chloride 107 98 - 107 mmol/L    Bicarbonate 24 21 - 32 mmol/L    Anion Gap 12 10 - 20 mmol/L    Urea Nitrogen 12 6 - 23 mg/dL    Creatinine 0.81 0.50 - 1.05 mg/dL    eGFR 86 >60 mL/min/1.73m*2    Calcium 7.9 (L) 8.6 - 10.6 mg/dL    Phosphorus 3.2 2.5 - 4.9 mg/dL    Albumin 3.1 (L) 3.4 - 5.0 g/dL     Vascular US upper extremity venous duplex left    Result Date: 12/15/2023            [address], [place] [address]   Tel [phone] and Fax [phone]  Vascular Lab Report VASC US UPPER EXTREMITY VENOUS DUPLEX LEFT  Patient Name:       CARLOS Barron Physician: [id number] Tiana Lewis MD Study Date:        12/15/2023          Ordering           [id number] ROLAND QUINTERO                                        Physician: MRN/PID:           [id number]            Technologist:      Arely Melgoza RVT Accession#:        [id number]        Technologist 2: Date of Birth/Age: 1970 / 53      Encounter#:        [id number]                    years Gender:            F Admission Status:  Inpatient           Location           [facility]                                        Performed:  Diagnosis/ICD: Left arm swelling-M79.89  CONCLUSIONS: Right Upper Venous: The subclavian vein demonstrates a normal spontaneous and phasic flow. Left Upper Venous: No evidence of acute deep vein thrombus visualized in the left upper extremity.  Imaging & Doppler Findings:  Right             Flow Subclavian Spontaneous/Phasic  Left                Compress Thrombus        Flow Internal Jugular      Yes      None   Spontaneous/Phasic Subclavian            Yes      None Subclavian Proximal   Yes      None   Spontaneous/Phasic Subclavian Mid        Yes      None Subclavian Distal     Yes      None Axillary              Yes      None   Spontaneous/Phasic Brachial              Yes      None Cephalic              Yes      None Basilic               Yes      None  [id number] Tiana Lewis MD Electronically signed by [id number] Tiana Lewis MD on 12/15/2023 at 5:26:24 PM  ** Final **     ECG 12 Lead    Result Date: 11/29/2023  Normal sinus rhythm Normal ECG When compared with ECG of 04-MAR-2021 10:43, No significant change was found Confirmed by Adam Lau ([id number]) on 11/29/2023 11:54:26 AM      Current Medications  Scheduled medications  acetaminophen, 650 mg, oral, q6h VISH  aspirin, 81 mg, oral, Daily  bisacodyl, 10 mg, rectal, Daily  docusate sodium, 100 mg, oral, BID  enoxaparin, 40 mg, subcutaneous,  Daily  gabapentin, 600 mg, oral, q12h  lidocaine, 1 patch, transdermal, Daily  [Held by provider] lisinopril 20 mg, hydroCHLOROthiazide 12.5 mg for Zestoretic/Prinizide, , oral, Daily  methocarbamol, 500 mg, oral, q6h VISH  polyethylene glycol, 17 g, oral, Daily  scopolamine, 1 patch, transdermal, q72h      Continuous medications     PRN medications  PRN medications: diphenhydrAMINE, HYDROmorphone, magnesium hydroxide, melatonin, naloxone, ondansetron **OR** ondansetron, traMADol, traMADol     Assessment  Lorraine Liu is a 53 y.o. female with a past medical history of meningioma and R breast cancer s/p mastectomy with immediate reconstruction via tissue expander placement on 3/19/2021. S/P R breast reconstruction via EMI free flap on 12/12 with Dr. Watkins of Plastic Surgery.     Plan/Recommendations  # S/p Right Breast EMI:   - Continue serial flap assessments Q4 hr per nursing with interval checks per plastic surgery team       ·  Assess Doppler pulse at marked site plus flap appearance (color, warmth, turgor)       ·  Nursing to notify plastic surgery team immediately if there are any acute changes          (Including decreased Doppler signal, pallor, temperature change, bleeding, etc.)  - May leave incisions BIANKA       ·  Monitor surgical sites for S/S of bleeding, infection or dehiscence   - Continue DEBBIE drain care per nursing       ·  Strip drain tubing TID and PRN       ·  Monitor and record output [id number]        ·  Keep drain sites C/D/I with daily drain dressing changes        ·  Call plastics if drain output is >30cc in an hour or greater than 200cc over 8 hours  - Continue incisional wound vac therapy to abdomen x10-14 days post op        ·  Settings: 125 mmHg low continuous suction        ·  Please keep dressing C/D/I, reinforce PRN        ·  Record vac output per nursing q8h       ·  Please alert plastics team with any concerns regarding vac        ·  Plan to transition to Prevena homegoing vac on day  of discharge    - Maintain beach chair positioning        ·  Avoid positioning that would apply pressure to flap region or incisions   - DC de anda 12/17, order placed at 1100, TOV  - Regular diet  - Maintain BP of 110/60 minimum to ensure adequate flap perfusion   - Low transfusion threshold to ensure adequate flap perfusion  - Daily ASA started POD #1 and continue x30 days post op (end date 1/12)   - Monitor VS/pulse ox [id number]   - Monitor AM CBC/RFP/Mg   - PT/OT ordered       ·  PT rec low intensity PT 5x/week (Order placed) TCC needs notified     #LUE swelling  - US negative    #LUE should pain  - Lidocaine patch ordered  - Continue to assess, ROM fully intact, no worsening pain with ROM     #LLQ abdomimal pain  - Controlled with pain medication  - Continue to monitor and re-assess after patient has bowel movement       ·  First bowel movement AM 12/17, abdominal pain improved however remains taught, tender            ·  Abdominal US ordered     # Acute postoperative pain  - Well controlled per patient, continue current regimen       ·  IV dilaudid DC       ·  Scheduled Tylenol 650mg PO G9lenit        · Gabapentin 600mg PO BID        · Robaxin 500mg  U1xikas       · Scopolamine patch [id number]  - IV Toradol held POD#1 due to slightly elevated sCre, currently discontinued 2/2 no IV access  - Bowel regimen with Colace 100 mg PO BID, miralax scheduled daily, doculax suppository added, MoM PRN while using narcotics  - IV Zofran PRN nausea due to narcotics   - Pain assessments [id number]      #Leukocytosis  - Likely reactive, continue to monitor on AM labs       ·  continues to downtrend  - S/p 3 doses post op Ancef     # Anemia   - Incoming HGB on admission at 12.2  - Presently no S/S of bleeding, considered likely due to intraoperative blood loss   - Keep T&S UTD, last obtained 12/14  - Monitor for S/S of bleeding or symptomatic anemia   - Low transfusion threshold to ensure adequate flap perfusion  - Monitor AM CBC       # Hx HTN   - BP currently stable   - Intermittently hypertensive (max systolic 148 over last 24 hours)  - Continue to hold home BP meds (Lisinopril, hydrochlorothiazide), likely until discharge unless consistently hypertensive  - Maintain BP of 110/60 minimum to ensure adequate flap perfusion        ·  Please avoid use of pressors as able               -> Recommended use of dobutamine and norepinephrine as indicated only        ·  For HTN, please avoid hydralazine if possible               -> Prefer use of calcium channel blockers as indicated for HTN (amlodipine)     # Insomnia  - 5mg melatonin PRN before bed    Prophylaxis:   - DVT: SQ Lovenox on POD#3 (12/15), ASA 81 once daily, SCDs  - Encourage IS x10 every hour while awake   - Bowel regimen: Colace 100mg BID      FEN/GI:   - DC IVMF 12/14  - Regular diet   - Monitor electrolytes and replete PRN   - Bowel regimen with Colace BID, Miralax, Doculax suppository  - IV Zofran PRN N/V    Dispo: Continue care on RNF. Will remain inpatient for continued flap monitoring and vac/drain surveillance postoperatively. Follow up appointment with plastic surgery department to be scheduled closer to anticipated date of discharge. Potential discharge early this week pending abdominal ultrasound      [facility]   Plastic and Reconstructive Surgery   Anish  Pager #[phone]  Team phones: [phone]

## 2023-12-17 NOTE — SIGNIFICANT EVENT
Lorraine Liu is a 53 y.o. female with a past medical history of meningioma and R breast cancer s/p mastectomy with immediate reconstruction via tissue expander placement on 3/19/2021. S/P R breast reconstruction via EMI free flap on 12/12 with Dr. Watkins of Plastic Surgery.     Subjective:  Resting comfortably in bed, pain well controlled, c/o pain now to left shoulder, nerve blocks removed, + flatus no BM, worked with PT today and went ok but painful. Denies any fever, chills, night sweats, CP, SOB, palpitations, nausea, vomiting, diarrhea, constipation, dysuria, hematuria, hematochezia, hematemesis, flank pain.      Objective:  Physical Exam  Constitutional: A&Ox3, calm and cooperative, NAD  Eyes: EOMI, clear sclera .  ENMT: Moist mucous membranes, no apparent injuries or lesions.  Head/Neck: NCAT  Cardiovascular: Normal rate and regular rhythm. 2+ equal pulses of the distal extremities.  Respiratory/Thorax: CTAB, regular respirations on RA. Good symmetric chest expansion.   Gastrointestinal: Abdomen taut, non-distended, appropriate generalized tenderness. Tender to palpation to LLQ with edema. Incisional wound vac to pfannenstiel incision without alarms for leak or malfunction. Holding appropriate suction at -125mmHg. Tissue surrounding vac dressing without erythema or edema. Aquacel AG covering umbilicus, c/d/I, pain block in place   Genitourinary: Voiding via indwelling de anda catheter. Clear, yellow output  Extremities: Left forearm with IV infiltration, no firmness or fluctuance. Ecchymosis to left forearm, IV removed. No peripheral edema. Moving all 4 extremities actively.   Neurological: A&Ox3.   Psychological: Appropriate mood and behavior.   Breast: EMI free flap recipient site to right breast which is appropriate color, without pallor. Warm to touch, soft. Flap approximated intact sutures, BIANKA, no evidence of bleeding or dehiscence. Mild puckering noted to superior medial aspect of incision. Intact  venous Doppler pulse at lateral and medial aspects of breast flap tissue. Appropriately tender to palpation at flap and surrounding tissue. 2+ Cap refill at the flap. No areas of surrounding edema, fluid collection, induration, drainage or bleeding. x1 DEBBIE drain to lateral breast with serosanguinous output.     # S/p Right Breast EMI:   - Continue serial flap assessments Q2hour per nursing and Q4hour per plastic surgery team       ·  Assess Doppler pulse at marked site plus flap appearance (color, warmth, turgor)       ·  Nursing to notify plastic surgery team immediately if there are any acute changes          (Including decreased Doppler signal, pallor, temperature change, bleeding, etc.)  - [place] erickson discontinued  - Breast incisions BIANKA  - Continue DEBBIE drain care per nursing       ·  Strip drain tubing TID and PRN       ·  Monitor and record output [id number]        ·  Keep drain sites C/D/I with daily drain dressing changes        ·  Call plastics if drain output is >30cc in an hour or greater than 200cc over 8 hours  - Continue incisional wound vac therapy to abdomen x10-14 days post op        ·  Settings: 125 mmHg low continuous suction        ·  Please keep dressing C/D/I, reinforce PRN        ·  Record vac output per nursing q8h       ·  Please alert plastics team with any concerns regarding vac        ·  Plan to transition to Prevena homegoing vac on day of discharge    - Maintain beach chair positioning        ·  Avoid positioning that would apply pressure to flap region or incisions   - OOB with assistance  - PT   - Maintain [place] while on bedrest with anticipated removal on POD#4 or 5 after successfully working with PT/OT  - Regular diet  - Maintain BP of 110/60 minimum to ensure adequate flap perfusion   - Low transfusion threshold to ensure adequate flap perfusion  - SQ Heparin [id number] resumed postoperatively, transition to Lovenox on POD#3 (12/15)   - Daily ASA started POD #1 and continue x30 days post  op (end date 1/12)   - Monitor VS/pulse ox E1yhgte   - Monitor AM CBC/RFP/Mg      #IV infiltration:  - IV removed, improved ecchymosis  - US to left forearm negative     # Acute postoperative pain  - Well controlled per patient, continue current regimen       ·  Dilaudid IV       ·  Scheduled Tylenol 650mg PO [id number]        · Gabapentin 600mg PO BID        · Robaxin 1000mg IV F2cezna       · Scopolamine patch [id number]  - Bowel regimen with Colace 100 mg PO BID while using narcotics, Miralax prn  - IV Zofran PRN nausea due to narcotics   - Pain assessments H1qoavd      # Anemia   - Incoming HGB on admission at 12.2  - H&H on AM labs at 8.7/27.4  - Presently no S/S of bleeding, considered likely due to intraoperative blood loss   - Keep T&S UTD, last obtained 12/14  - Monitor for S/S of bleeding or symptomatic anemia   - Low transfusion threshold to ensure adequate flap perfusion  - Transfuse for HGB <7 per protocol   - Monitor AM CBC      # Hx HTN   - BP currently stable   - Elevated OVN 12/13 (147/63) then stabilized in AM to 115/77. Continue to hold BP meds  - Resume home BP meds (hold Lisinopril/hydrochlorothiazide when able)   - Maintain BP of 110/60 minimum to ensure adequate flap perfusion        ·  Please avoid use of pressors as able               -> Recommended use of dobutamine and norepinephrine as indicated only        ·  For HTN, please avoid hydralazine if possible               -> Prefer use of calcium channel blockers as indicated for HTN (amlodipine)     Prophylaxis:   - DVT: Lovenox, ASA 81 once daily, SCDs  - Encourage IS x10 every hour while awake   - Bowel regimen: Colace 100mg BID, miralax daily scheduled     FEN/GI:   - DC IVMF 12/14  - Regular diet   - Monitor electrolytes and replete PRN   - Bowel regimen with Colace BID   - IV Zofran PRN N/V       Disposition: Continue care on RNF. Will remain inpatient for continued flap monitoring and vac/drain surveillance postoperatively. Follow up  appointment with plastic surgery department to be scheduled closer to anticipated date of discharge.      DEEPAK Marlow-CNP  Plastic and Reconstructive Surgery   Available via pager: [phone] or team phones: [phone]

## 2023-12-17 NOTE — PROGRESS NOTES
12/17/23 0943   Discharge Planning   Living Arrangements Spouse/significant other   Support Systems Spouse/significant other;Family members  (Aunt is a nurse, coming in from [place] for 10 days)   Type of Residence Private residence   Does the patient need discharge transport arranged? No   Financial Resource Strain   How hard is it for you to pay for the very basics like food, housing, medical care, and heating? Not hard   Housing Stability   In the last 12 months, was there a time when you were not able to pay the mortgage or rent on time? N   In the last 12 months, was there a time when you did not have a steady place to sleep or slept in a shelter (including now)? N   Transportation Needs   In the past 12 months, has lack of transportation kept you from medical appointments or from getting medications? no   In the past 12 months, has lack of transportation kept you from meetings, work, or from getting things needed for daily living? No         Met with patient bedside, she is up in chair; she is hopeful for discharge tomorrow. S/P R breast reconstruction via EMI free flap on 12/12; currently with R breast J-tbue and incision care; wound vac to abd; per chart plan to transition to Prevena vac at [place].     Patient does have a walker at home, did work with PT yesterday explained that getting OOB was the most difficult but was also limited by pain, has not had HHC in the past however open to it if needed for nursing or PT. PCP is Lyle Herbert ; she lives in [place] w/spouse.     TCC to follow.

## 2023-12-17 NOTE — CARE PLAN
The patient's goals for the shift include  rate pain less than a 5    The clinical goals for the shift include a bowel movement; good pain control; increasing mobility    Problem: Pain - Adult  Goal: Verbalizes/displays adequate comfort level or baseline comfort level  Outcome: Progressing

## 2023-12-18 ENCOUNTER — PHARMACY VISIT (OUTPATIENT)
Dept: PHARMACY | Facility: CLINIC | Age: 53
End: 2023-12-18
Payer: COMMERCIAL

## 2023-12-18 VITALS
OXYGEN SATURATION: 95 % | DIASTOLIC BLOOD PRESSURE: 77 MMHG | RESPIRATION RATE: 16 BRPM | BODY MASS INDEX: 34.27 KG/M2 | HEIGHT: 65 IN | WEIGHT: 205.69 LBS | TEMPERATURE: 98.6 F | SYSTOLIC BLOOD PRESSURE: 126 MMHG | HEART RATE: 99 BPM

## 2023-12-18 LAB
ALBUMIN SERPL BCP-MCNC: 3.4 G/DL (ref 3.4–5)
ANION GAP SERPL CALC-SCNC: 14 MMOL/L (ref 10–20)
BUN SERPL-MCNC: 11 MG/DL (ref 6–23)
CALCIUM SERPL-MCNC: 8.4 MG/DL (ref 8.6–10.6)
CHLORIDE SERPL-SCNC: 105 MMOL/L (ref 98–107)
CO2 SERPL-SCNC: 23 MMOL/L (ref 21–32)
CREAT SERPL-MCNC: 0.83 MG/DL (ref 0.5–1.05)
ERYTHROCYTE [DISTWIDTH] IN BLOOD BY AUTOMATED COUNT: 13.5 % (ref 11.5–14.5)
GFR SERPL CREATININE-BSD FRML MDRD: 84 ML/MIN/1.73M*2
GLUCOSE SERPL-MCNC: 94 MG/DL (ref 74–99)
HCT VFR BLD AUTO: 27.3 % (ref 36–46)
HGB BLD-MCNC: 8.3 G/DL (ref 12–16)
LABORATORY COMMENT REPORT: NORMAL
MCH RBC QN AUTO: 30.2 PG (ref 26–34)
MCHC RBC AUTO-ENTMCNC: 30.4 G/DL (ref 32–36)
MCV RBC AUTO: 99 FL (ref 80–100)
NRBC BLD-RTO: 0.2 /100 WBCS (ref 0–0)
PATH REPORT.FINAL DX SPEC: NORMAL
PATH REPORT.GROSS SPEC: NORMAL
PATH REPORT.TOTAL CANCER: NORMAL
PHOSPHATE SERPL-MCNC: 4.1 MG/DL (ref 2.5–4.9)
PLATELET # BLD AUTO: 263 X10*3/UL (ref 150–450)
POTASSIUM SERPL-SCNC: 3.9 MMOL/L (ref 3.5–5.3)
RBC # BLD AUTO: 2.75 X10*6/UL (ref 4–5.2)
SODIUM SERPL-SCNC: 138 MMOL/L (ref 136–145)
WBC # BLD AUTO: 11.1 X10*3/UL (ref 4.4–11.3)

## 2023-12-18 PROCEDURE — 36415 COLL VENOUS BLD VENIPUNCTURE: CPT

## 2023-12-18 PROCEDURE — 2500000001 HC RX 250 WO HCPCS SELF ADMINISTERED DRUGS (ALT 637 FOR MEDICARE OP): Performed by: NURSE PRACTITIONER

## 2023-12-18 PROCEDURE — 97116 GAIT TRAINING THERAPY: CPT | Mod: GP,CQ

## 2023-12-18 PROCEDURE — 97110 THERAPEUTIC EXERCISES: CPT | Mod: GP,CQ

## 2023-12-18 PROCEDURE — 80069 RENAL FUNCTION PANEL: CPT

## 2023-12-18 PROCEDURE — 2500000001 HC RX 250 WO HCPCS SELF ADMINISTERED DRUGS (ALT 637 FOR MEDICARE OP)

## 2023-12-18 PROCEDURE — 99232 SBSQ HOSP IP/OBS MODERATE 35: CPT

## 2023-12-18 PROCEDURE — 85027 COMPLETE CBC AUTOMATED: CPT

## 2023-12-18 PROCEDURE — 2500000005 HC RX 250 GENERAL PHARMACY W/O HCPCS

## 2023-12-18 PROCEDURE — 2500000004 HC RX 250 GENERAL PHARMACY W/ HCPCS (ALT 636 FOR OP/ED)

## 2023-12-18 PROCEDURE — RXMED WILLOW AMBULATORY MEDICATION CHARGE

## 2023-12-18 RX ORDER — METHOCARBAMOL 500 MG/1
500 TABLET, FILM COATED ORAL EVERY 6 HOURS SCHEDULED
Qty: 12 TABLET | Refills: 0 | Status: SHIPPED | OUTPATIENT
Start: 2023-12-18 | End: 2023-12-28 | Stop reason: SDUPTHER

## 2023-12-18 RX ORDER — ONDANSETRON 4 MG/1
4 TABLET, FILM COATED ORAL EVERY 8 HOURS PRN
Qty: 9 TABLET | Refills: 0 | Status: SHIPPED | OUTPATIENT
Start: 2023-12-18 | End: 2023-12-21

## 2023-12-18 RX ORDER — DOCUSATE SODIUM 100 MG/1
100 CAPSULE, LIQUID FILLED ORAL 2 TIMES DAILY
Qty: 10 CAPSULE | Refills: 0 | Status: SHIPPED | OUTPATIENT
Start: 2023-12-18 | End: 2023-12-23

## 2023-12-18 RX ORDER — GABAPENTIN 300 MG/1
600 CAPSULE ORAL EVERY 12 HOURS
Qty: 28 CAPSULE | Refills: 0 | Status: SHIPPED | OUTPATIENT
Start: 2023-12-18 | End: 2023-12-28 | Stop reason: SDUPTHER

## 2023-12-18 RX ORDER — LIDOCAINE 50 MG/G
1 PATCH TOPICAL DAILY
Qty: 4 PATCH | Refills: 0 | Status: SHIPPED | OUTPATIENT
Start: 2023-12-19 | End: 2024-01-08 | Stop reason: WASHOUT

## 2023-12-18 RX ORDER — ASPIRIN 81 MG/1
81 TABLET ORAL DAILY
Qty: 24 TABLET | Refills: 0 | Status: SHIPPED | OUTPATIENT
Start: 2023-12-18 | End: 2024-01-11

## 2023-12-18 RX ORDER — TRAMADOL HYDROCHLORIDE 50 MG/1
50 TABLET ORAL EVERY 6 HOURS PRN
Qty: 20 TABLET | Refills: 0 | Status: SHIPPED | OUTPATIENT
Start: 2023-12-18 | End: 2023-12-28 | Stop reason: SDUPTHER

## 2023-12-18 RX ORDER — ACETAMINOPHEN 325 MG/1
650 TABLET ORAL EVERY 6 HOURS SCHEDULED
Qty: 56 TABLET | Refills: 0 | Status: SHIPPED | OUTPATIENT
Start: 2023-12-18 | End: 2023-12-25

## 2023-12-18 RX ADMIN — GABAPENTIN 600 MG: 300 CAPSULE ORAL at 17:30

## 2023-12-18 RX ADMIN — METHOCARBAMOL 500 MG: 500 TABLET ORAL at 17:30

## 2023-12-18 RX ADMIN — METHOCARBAMOL 500 MG: 500 TABLET ORAL at 11:50

## 2023-12-18 RX ADMIN — LIDOCAINE 1 PATCH: 4 PATCH TOPICAL at 09:49

## 2023-12-18 RX ADMIN — Medication 1 APPLICATION: at 09:54

## 2023-12-18 RX ADMIN — ACETAMINOPHEN 650 MG: 325 TABLET ORAL at 11:50

## 2023-12-18 RX ADMIN — POLYETHYLENE GLYCOL 3350 17 G: 17 POWDER, FOR SOLUTION ORAL at 09:49

## 2023-12-18 RX ADMIN — ACETAMINOPHEN 650 MG: 325 TABLET ORAL at 06:12

## 2023-12-18 RX ADMIN — ACETAMINOPHEN 650 MG: 325 TABLET ORAL at 00:35

## 2023-12-18 RX ADMIN — GABAPENTIN 600 MG: 300 CAPSULE ORAL at 06:12

## 2023-12-18 RX ADMIN — METHOCARBAMOL 500 MG: 500 TABLET ORAL at 00:35

## 2023-12-18 RX ADMIN — ACETAMINOPHEN 650 MG: 325 TABLET ORAL at 17:30

## 2023-12-18 RX ADMIN — DOCUSATE SODIUM 100 MG: 100 CAPSULE, LIQUID FILLED ORAL at 09:49

## 2023-12-18 RX ADMIN — ASPIRIN 81 MG: 81 TABLET, COATED ORAL at 09:49

## 2023-12-18 RX ADMIN — METHOCARBAMOL 500 MG: 500 TABLET ORAL at 06:12

## 2023-12-18 ASSESSMENT — COGNITIVE AND FUNCTIONAL STATUS - GENERAL
DRESSING REGULAR LOWER BODY CLOTHING: A LITTLE
TURNING FROM BACK TO SIDE WHILE IN FLAT BAD: A LITTLE
MOVING FROM LYING ON BACK TO SITTING ON SIDE OF FLAT BED WITH BEDRAILS: A LITTLE
TURNING FROM BACK TO SIDE WHILE IN FLAT BAD: A LITTLE
TOILETING: A LITTLE
CLIMB 3 TO 5 STEPS WITH RAILING: A LITTLE
MOBILITY SCORE: 17
MOBILITY SCORE: 18
HELP NEEDED FOR BATHING: A LITTLE
MOVING TO AND FROM BED TO CHAIR: A LITTLE
CLIMB 3 TO 5 STEPS WITH RAILING: A LOT
WALKING IN HOSPITAL ROOM: A LITTLE
WALKING IN HOSPITAL ROOM: A LITTLE
STANDING UP FROM CHAIR USING ARMS: A LITTLE
DRESSING REGULAR UPPER BODY CLOTHING: A LITTLE
DAILY ACTIVITIY SCORE: 20
MOVING FROM LYING ON BACK TO SITTING ON SIDE OF FLAT BED WITH BEDRAILS: A LITTLE
MOVING TO AND FROM BED TO CHAIR: A LITTLE
STANDING UP FROM CHAIR USING ARMS: A LITTLE

## 2023-12-18 ASSESSMENT — PAIN SCALES - GENERAL: PAINLEVEL_OUTOF10: 4

## 2023-12-18 ASSESSMENT — PAIN - FUNCTIONAL ASSESSMENT: PAIN_FUNCTIONAL_ASSESSMENT: 0-10

## 2023-12-18 NOTE — PROGRESS NOTES
Physical Therapy    Physical Therapy Treatment    Patient Name: Lorraine Liu  MRN: [id number]  Today's Date: 12/18/2023  Time Calculation  Start Time: 1005  Stop Time: 1030  Time Calculation (min): 25 min       Assessment/Plan   PT Assessment  PT Assessment Results: Decreased strength, Decreased mobility  Rehab Prognosis: Good  End of Session Communication: Bedside nurse  Assessment Comment: Patient able to ambulate increased distances with FWW and complete stair training with supervision. All PT goals have been met.  End of Session Patient Position: Up in chair, Alarm off, not on at start of session  PT Plan  Inpatient/Swing Bed or Outpatient: Inpatient  PT Plan  Treatment/Interventions: Bed mobility, Transfer training, Gait training, Stair training, Balance training, Strengthening, Endurance training, Range of motion, Therapeutic exercise, Therapeutic activity, Home exercise program, Postural re-education  PT Plan: Skilled PT  PT Frequency: 5 times per week  PT Discharge Recommendations: Low intensity level of continued care (Pt. significantly limited by pain this date. Anticipate once pain is better controlled that pt. will benefit from low intensity PT with family assist upon d/c. Will continue to follow pt.'s progress closely to determine d/c needs)  Equipment Recommended upon Discharge: Wheeled walker  PT Recommended Transfer Status: Assistive device, Assist x1  PT - OK to Discharge: Yes (Eval complete, refer to dispo)      General Visit Information:   PT  Visit  PT Received On: 12/18/23  Response to Previous Treatment: Patient with no complaints from previous session.  General  Reason for Referral: s/p R breast reconstruction via EMI free flap on 12/12  Past Medical History Relevant to Rehab: meningioma and R breast cancer s/p mastectomy with immediate reconstruction via tissue expander placement on 3/19/2021.  Prior to Session Communication: Bedside nurse  Patient Position Received: Up in chair, Alarm off,  not on at start of session  Preferred Learning Style: verbal, visual  General Comment: RN cleared patient to work with therapy. Patient pleasant and agreeable to therapy session.    Subjective   Precautions:  Precautions  Medical Precautions: Abdominal precautions, Fall precautions  Post-Surgical Precautions: Abdominal surgery precautions    Objective     Cognition:  Cognition  Overall Cognitive Status: Within Functional Limits  Orientation Level: Oriented X4    Activity Tolerance:  Activity Tolerance  Endurance: Tolerates 30 min exercise with multiple rests  Treatments:  Therapeutic Exercise  Therapeutic Exercise Performed: Yes  Therapeutic Exercise Activity 1: Seated Bilateral LE: ankle pumps, LAQ, marching, HS, HIP ABD/ADD, GS x10 each    Bed Mobility  Bed Mobility: No    Ambulation/Gait Training  Ambulation/Gait Training Performed: Yes  Ambulation/Gait Training 1  Surface 1: Level tile  Device 1: Rolling walker  Assistance 1: Close supervision  Quality of Gait 1: Forward flexed posture  Comments/Distance (ft) 1: 200 feet  Transfers  Transfer: Yes  Transfer 1  Transfer From 1: Chair with arms to  Transfer to 1: Stand  Transfer Device 1: Walker  Transfer Level of Assistance 1: Close supervision  Transfers 2  Transfer From 2: Stand to  Transfer to 2: Chair with arms  Transfer Device 2: Walker  Transfer Level of Assistance 2: Close supervision    Stairs  Stairs: Yes  Stairs  Rails 1: Left  Device 1: No device  Assistance 1: Contact guard  Comment/Number of Steps 1: 13 steps    Outcome Measures:  [facility] Basic Mobility  Turning from your back to your side while in a flat bed without using bedrails: A little  Moving from lying on your back to sitting on the side of a flat bed without using bedrails: A little  Moving to and from bed to chair (including a wheelchair): A little  Standing up from a chair using your arms (e.g. wheelchair or bedside chair): A little  To walk in hospital room: A little  Climbing 3-5 steps with  railing: A little  Basic Mobility - Total Score: 18    Education Documentation  Precautions, taught by Katy Moreau PTA at 12/18/2023 11:11 AM.  Learner: Patient  Readiness: Acceptance  Method: Explanation  Response: Verbalizes Understanding    Education Comments  No comments found.        OP EDUCATION:       Encounter Problems       Encounter Problems (Active)       Pain - Adult             Encounter Problems (Resolved)       Balance       Patient to demonstrate Edward static and dynamic standing balance with LRAD, no LOB with change of directions, no hesitancy, appropriate CORRINA and sequencing to complete functional task.  (Met)       Start:  12/16/23    Expected End:  12/30/23    Resolved:  12/18/23            Mobility       STG - Patient will ambulate >/= 75 ft Edward with LRAD (Met)       Start:  12/16/23    Expected End:  12/30/23    Resolved:  12/18/23         Patient to ascend/descend >/= 3 stairs with HR and CGAx1 to demo ability to safely traverse JUANPABLO  (Met)       Start:  12/16/23    Expected End:  12/30/23    Resolved:  12/18/23            Transfers       STG - Patient will perform bed mobility Edward (Met)       Start:  12/16/23    Expected End:  12/30/23    Resolved:  12/18/23         STG - Patient will transfer sit to and from stand Edward with LRAD (Met)       Start:  12/16/23    Expected End:  12/30/23    Resolved:  12/18/23

## 2023-12-18 NOTE — CARE PLAN
The patient's goals for the shift include  ambulating with FWW; to be able to get some rest    The clinical goals for the shift include good pain control, pt to ambulate as much as tolerated, and minimally interrupted rest.      Problem: Pain - Adult  Goal: Verbalizes/displays adequate comfort level or baseline comfort level  Outcome: Progressing     Problem: Safety - Adult  Goal: Free from fall injury  Outcome: Progressing     Problem: Discharge Planning  Goal: Discharge to home or other facility with appropriate resources  Outcome: Progressing     Problem: Chronic Conditions and Co-morbidities  Goal: Patient's chronic conditions and co-morbidity symptoms are monitored and maintained or improved  Outcome: Progressing     Problem: Daily Care  Goal: Daily care needs are met  Outcome: Progressing     Problem: Psychosocial Needs  Goal: Demonstrates ability to cope with hospitalization/illness  Outcome: Progressing  Goal: Collaborate with me, my family, and caregiver to identify my specific goals  Outcome: Progressing     Problem: Skin  Goal: Decreased wound size/increased tissue granulation at next dressing change  Outcome: Progressing  Goal: Participates in plan/prevention/treatment measures  Outcome: Progressing  Goal: Prevent/manage excess moisture  Outcome: Progressing  Goal: Prevent/minimize sheer/friction injuries  Outcome: Progressing  Goal: Promote/optimize nutrition  Outcome: Progressing  Goal: Promote skin healing  Outcome: Progressing

## 2023-12-18 NOTE — SIGNIFICANT EVENT
Department of Plastic and Reconstructive Surgery  PM Patient Reassessment/Flap Check    Subjective:  Found resting comfortably in bed. Endorses persistent pain to left posterior shoulder region and recurrent pain to abdominal surgical site. Tolerating scheduled pain medications. Voiding independently. Had x2 BMs today with persistent abdominal distention for which abdominal US and CT scans were ordered. Ambulating with some lower abdominal discomfort and overall deconditioned strength. Denies any fever, chills, night sweats, CP, palpitations, nausea, vomiting, diarrhea, constipation, dysuria, hematuria, hematochezia, hematemesis, flank pain. Had new 1L supplemental O2 requirement this evening for subjective dyspnea. Pulse ox off supplemental O2 93-95%. Utilizing IS.      Objective:  Vitals:    12/17/23 2053   BP: (!) 139/92   Pulse: 87   Resp: 16   Temp: 36.6 °C (97.9 °F)   SpO2: 95%     Physical Exam  Constitutional: A&Ox3, calm and cooperative, NAD.  Eyes: EOMI, clear sclera.  ENMT: Moist mucous membranes, no apparent injuries or lesions.  Head/Neck: NC/AT.  Cardiovascular: Normal rate and regular rhythm. 2+ equal pulses of the distal extremities.  Respiratory/Thorax: CTAB, regular respirations on 1L supplemental O2 via NC. Good symmetric chest expansion.   Gastrointestinal: Abdomen taut, non-distended, appropriate generalized tenderness. Tender to palpation to LLQ with edema. Incisional wound vac to pfannenstiel incision without alarms for leak or malfunction. Holding appropriate suction at -125mmHg. Tissue surrounding vac dressing without erythema or edema. Aquacel AG covering umbilicus.   Genitourinary: Voiding independently.   Extremities: Mild symmetric b/l lower extremity edema. Moving all 4 extremities actively. No calf tenderness, negative Homen sign b/l.   Neurological: A&Ox3.   Psychological: Appropriate mood and behavior.   Breast: EMI free flap recipient site to right breast which is appropriate  color, without pallor. Warm to touch, soft. Flap approximated intact sutures, incisions BIANKA, no evidence of bleeding or dehiscence. Mild puckering noted to superior medial aspect of incision. Intact venous Doppler pulse at lateral and medial aspects of breast flap tissue. Appropriately tender to palpation at flap and surrounding tissue. 2+ Cap refill at the flap. No areas of surrounding edema, fluid collection, induration, drainage or bleeding. x1 DEBBIE drain to lateral breast with serosanguinous output.    Assessment:  Lorraine Liu is a 53 y.o. female with a past medical history of meningioma and R breast cancer s/p mastectomy with immediate reconstruction via tissue expander placement on 3/19/2021. S/P R breast reconstruction via EMI free flap on 12/12 with Dr. Watkins of Plastic Surgery.     Plan:   - Continue q4h flap checks per nursing with interval checks per plastics team   - Follow up finalized report of abdominal CT imaging   - Wean supplemental O2 as patient tolerates  - Consider CXR if patient persistently dyspneic or requiring supplemental O2   - PT reassessment 12/18, patient with generalized deconditioning during activity   - Remaining plan as stated in daily progress notes     Patient and plan discussed with Dr. Watkins.    Tish Dos Santos PA-C  Plastic and Reconstructive Surgery   Pager #[phone]  Team phones: [phone]

## 2023-12-18 NOTE — NURSING NOTE
Lorraine Liu discharged at 5:33 PM  and 12/18/23   Patient discharged home health care via private transport.  Discharge education and teaching completed with Lorraine Liu and .  RN signature: INOCENCIO Montelongo

## 2023-12-18 NOTE — CARE PLAN
Problem: Pain - Adult  Goal: Verbalizes/displays adequate comfort level or baseline comfort level  Outcome: Progressing   The patient's goals for the shift include  rate pain less than a 5    The clinical goals for the shift include Patient remain safe throughout shift

## 2023-12-18 NOTE — DISCHARGE SUMMARY
Discharge Diagnosis  Acquired absence of breast and absent nipple, right    Test Results Pending At Discharge  Pending Labs       No current pending labs.            Hospital Course  BRIEF HISTORY:    Lorraine Liu is a 53 y.o. female with a past medical history of meningioma and R breast cancer s/p mastectomy with immediate reconstruction via tissue expander placement on 3/19/2021. She presented for further R breast reconstruction via EMI free flap on 12/12 with Dr. Watkins of Plastic Surgery.     HOSPITAL COURSE:    Patient admited to the plastic surgery service post operatively for close monitoring following right unilateral EMI breast reconstruction. Hospital course fairly uncomplicated. On Friday 12/15, an IV infiltrated causing left arm swelling. An ultrasound was completed to rule out blood clot which came back negative. You were evaluated by physical therapy on 12/16, and they recommended low intensity physical therapy 5x/week. On 12/17 an abdominal ultrasound was ordered given abdominal feeling taught and tender on palpation. Results were limited and possibly suggested free fluid collections. Follow up abdominal CT was suggested which revealed postsurgical changes of lower abdominal wall with no measurable fluid collection. Trace bilat pleural effusions with adjacent atelectasis.     CONSULTATIONS:  Acute pain consulted perioperatively and placed/managed nerve blocks for post operative pain management. Patient tolerated well and catheters were removed at bedside on 12/16 without complication.     DAY OF DISCHARGE:    On the day of discharge, the patient was seen and evaluated by the Plastic Surgery team and deemed suitable for discharge.  There were no significant events overnight. Vitals were reviewed and within normal limits. Labs were stable at discharge. On day of discharge the patient was tolerating a diet, pain was controlled on PO pain medication, was ambulating well and voiding spontaneously. The  patient was given detailed discharge instructions and were scheduled to follow up as an outpatient.     Pertinent Physical Exam At Time of Discharge  Physical Exam  See daily progress note    Home Medications     Medication List      START taking these medications     acetaminophen 325 mg tablet; Commonly known as: Tylenol; Take 2 tablets   (650 mg) by mouth every 6 hours for 7 days.   aspirin 81 mg EC tablet; Take 1 tablet (81 mg) by mouth once daily for   24 days.   docusate sodium 100 mg capsule; Commonly known as: Colace; Take 1   capsule (100 mg) by mouth 2 times a day for 5 days.   gabapentin 300 mg capsule; Commonly known as: Neurontin; Take 2 capsules   (600 mg) by mouth every 12 hours for 7 days.   lidocaine 5 % patch; Commonly known as: Lidoderm; Place 1 patch over 12   hours on the skin once daily for 4 days. Remove & discard patch within 12   hours or as directed by MD. Do not start before December 19, 2023.; Start   taking on: December 19, 2023   methocarbamol 500 mg tablet; Commonly known as: Robaxin; Take 1 tablet   (500 mg) by mouth every 6 hours for 3 days.   ondansetron 4 mg tablet; Commonly known as: Zofran; Take 1 tablet (4 mg)   by mouth every 8 hours if needed for nausea or vomiting for up to 3 days.   traMADol 50 mg tablet; Commonly known as: Ultram; Take 1 tablet (50 mg)   by mouth every 6 hours if needed for moderate pain (4 - 6) for up to 5   days.     CONTINUE taking these medications     lisinopriL-hydrochlorothiazide 20-12.5 mg tablet; Take 2 tablets by   mouth once daily     STOP taking these medications     chlorhexidine 0.12 % solution; Commonly known as: Peridex   tamoxifen 20 mg tablet; Commonly known as: Nolvadex       Outpatient Follow-Up  Future Appointments   Date Time Provider Department Center   12/28/2023 10:00 AM Leigh Watkins MD [id number] [facility] [address]   1/15/2024  9:30 AM Omar Mendoza MD [id number] [facility]   1/29/2024 10:15 AM TRI MAMMO 1 TRIMAM [facility]   2/5/2024   9:30 AM Blayne Franco, APRN-CNP [id number] [facility]       Megan Bob PA-C

## 2023-12-18 NOTE — PROGRESS NOTES
Department of Plastic and Reconstructive Surgery   Plastic Surgery Progress Note    Patient Name: Lorraine Liu  MRN: [id number]  Date:  12/18/23     Subjective  Pain controlled this AM. States LLQ abdominal pain feels better and softer after bowel movement yesterday. Notes some mild tenderness to RLQ today. Notes difficulty with inspiration while using IS. Denies SOB at rest. On room air. States she is ambulating with walker without issues. Voiding without issues. Denies fever, chest pain, SOB, n/v/d, numbness, tingling.    Objective    Physical Exam   Constitutional: NAD  Eyes: EOMI, clear sclera .  ENMT: Moist mucous membranes, no apparent injuries or lesions.  Head/Neck: NCAT  Cardiovascular: Normal rate and regular rhythm. 2+ equal pulses of the distal extremities.  Respiratory/Thorax: CTAB, regular respirations on RA. Good symmetric chest expansion.   Gastrointestinal: Abdomen soft, appropriate generalized tenderness. Tender to palpation to LLQ and RLQ. +BS. Incisional wound vac to pfannenstiel incision without alarms for leak or malfunction. Holding appropriate suction at -125mmHg. Tissue surrounding vac dressing without erythema or edema. Aquacel AG covering umbilicus, c/d/I.  Genitourinary: Voiding independently  Extremities: Left forearm and hand edema, no firmness or fluctuance. Ecchymosis to left forearm 2/2 previous IV infiltrate. Full ROM intact x4 extremities. LUE with no limitations to ROM  Psychological: Appropriate mood and behavior.   Breast: EMI free flap recipient site to right breast which is appropriate color, without pallor. Warm to touch, soft. Flap approximated with intact sutures, no evidence of bleeding or dehiscence. Mild puckering noted to superior medial aspect of incision. Intact venous Doppler pulse at lateral and medial aspects of breast flap tissue. No areas of surrounding edema, fluid collection, induration, drainage or bleeding. x1 DEBBIE drain to lateral breast with serosanguinous  output.  Diagnostics   Results for orders placed or performed during the hospital encounter of 12/12/23 (from the past 24 hour(s))   CBC   Result Value Ref Range    WBC 11.1 4.4 - 11.3 x10*3/uL    nRBC 0.2 (H) 0.0 - 0.0 /100 WBCs    RBC 2.75 (L) 4.00 - 5.20 x10*6/uL    Hemoglobin 8.3 (L) 12.0 - 16.0 g/dL    Hematocrit 27.3 (L) 36.0 - 46.0 %    MCV 99 80 - 100 fL    MCH 30.2 26.0 - 34.0 pg    MCHC 30.4 (L) 32.0 - 36.0 g/dL    RDW 13.5 11.5 - 14.5 %    Platelets 263 150 - 450 x10*3/uL     Vascular US upper extremity venous duplex left    Result Date: 12/15/2023            [place] [id number]   Tel [phone] and Fax [phone]  Vascular Lab Report VASC US UPPER EXTREMITY VENOUS DUPLEX LEFT  Patient Name:      CARLOS Barron Physician: [id number] Tiana Lewis MD Study Date:        12/15/2023          Ordering           [id number] ROLAND QUINTERO                                        Physician: MRN/PID:           [id number]            Technologist:      Arely Melgoza FELY Accession#:        [id number]        Technologist 2: Date of Birth/Age: 1970 / 53      Encounter#:        [id number]                    years Gender:            F Admission Status:  Inpatient           Location           [facility]                                        Performed:  Diagnosis/ICD: Left arm swelling-M79.89  CONCLUSIONS: Right Upper Venous: The subclavian vein demonstrates a normal spontaneous and phasic flow. Left Upper Venous: No evidence of acute deep vein thrombus visualized in the left upper extremity.  Imaging & Doppler Findings:  Right             Flow Subclavian Spontaneous/Phasic  Left                Compress Thrombus        Flow Internal Jugular      Yes      None   Spontaneous/Phasic Subclavian            Yes      None Subclavian Proximal   Yes      None   Spontaneous/Phasic Subclavian Mid        Yes       None Subclavian Distal     Yes      None Axillary              Yes      None   Spontaneous/Phasic Brachial              Yes      None Cephalic              Yes      None Basilic               Yes      None  [id number] Tiana Lewis MD Electronically signed by [id number] Tiana Lewis MD on 12/15/2023 at 5:26:24 PM  ** Final **     ECG 12 Lead    Result Date: 11/29/2023  Normal sinus rhythm Normal ECG When compared with ECG of 04-MAR-2021 10:43, No significant change was found Confirmed by Adam Lau ([id number]) on 11/29/2023 11:54:26 AM      Current Medications  Scheduled medications  acetaminophen, 650 mg, oral, q6h VISH  aspirin, 81 mg, oral, Daily  bisacodyl, 10 mg, rectal, Daily  docusate sodium, 100 mg, oral, BID  enoxaparin, 40 mg, subcutaneous, Daily  gabapentin, 600 mg, oral, q12h  lidocaine, 1 patch, transdermal, Daily  [Held by provider] lisinopril 20 mg, hydroCHLOROthiazide 12.5 mg for Zestoretic/Prinizide, , oral, Daily  methocarbamol, 500 mg, oral, q6h VISH  polyethylene glycol, 17 g, oral, Daily  surgical lubricant, , ,       Continuous medications     PRN medications  PRN medications: diphenhydrAMINE, magnesium hydroxide, melatonin, naloxone, ondansetron **OR** ondansetron, surgical lubricant, traMADol, traMADol     Assessment  Lorraine Liu is a 53 y.o. female with a past medical history of meningioma and R breast cancer s/p mastectomy with immediate reconstruction via tissue expander placement on 3/19/2021. S/P R breast reconstruction via EMI free flap on 12/12 with Dr. Watkins of Plastic Surgery.     Plan/Recommendations  # S/p Right Breast EMI:   - Continue serial flap assessments Q4 hr per nursing with interval checks per plastic surgery team       ·  Assess Doppler pulse at marked site plus flap appearance (color, warmth, turgor)       ·  Nursing to notify plastic surgery team immediately if there are any acute changes          (Including decreased Doppler signal, pallor, temperature change, bleeding,  etc.)  - May leave incisions BIANKA       ·  Monitor surgical sites for S/S of bleeding, infection or dehiscence   - Continue DEBBIE drain care per nursing       ·  Strip drain tubing TID and PRN       ·  Monitor and record output [id number]        ·  Keep drain sites C/D/I with daily drain dressing changes        ·  Call plastics if drain output is >30cc in an hour or greater than 200cc over 8 hours  - Continue incisional wound vac therapy to abdomen x10-14 days post op        ·  Settings: 125 mmHg low continuous suction        ·  Please keep dressing C/D/I, reinforce PRN        ·  Record vac output per nursing q8h       ·  Please alert plastics team with any concerns regarding vac        ·  Plan to transition to Prevena homegoing vac on day of discharge    - Maintain beach chair positioning        ·  Avoid positioning that would apply pressure to flap region or incisions   - Maintain BP of 110/60 minimum to ensure adequate flap perfusion   - Low transfusion threshold to ensure adequate flap perfusion  - Daily ASA started POD #1 and continue x30 days post op (end date 1/12)   - Monitor VS/pulse ox [id number]   - Monitor AM CBC/RFP/Mg   - PT/OT ordered       ·  PT rec low intensity PT 5x/week (Order placed) TCC needs notified     #LUE swelling  - US negative    #LUE should pain  - Lidocaine patch ordered  - Continue to assess, ROM fully intact, no worsening pain with ROM     #LLQ abdomimal pain  - Controlled with pain medication  - Continue to monitor and re-assess after patient has bowel movement       ·  First bowel movement AM 12/17, abdominal pain improved however remains taught, tender            ·  Abdominal US ordered, limited exam            .  Follow up CT      # Acute postoperative pain  - Well controlled per patient, continue current regimen       ·  IV dilaudid DC       ·  Scheduled Tylenol 650mg PO [id number]        · Gabapentin 600mg PO BID        · Robaxin 500mg  [id number]  - IV Toradol held POD#1 due to slightly  elevated sCre, currently discontinued 2/2 no IV access  - Bowel regimen with Colace 100 mg PO BID, miralax scheduled daily, doculax suppository added, MoM PRN while using narcotics  - IV Zofran PRN nausea due to narcotics   - Pain assessments [id number]      #Leukocytosis, resolved  - Likely reactive, continue to monitor on AM labs  - S/p 3 doses post op Ancef     # Anemia   - Incoming HGB on admission at 12.2  - Presently no S/S of bleeding, considered likely due to intraoperative blood loss   - Keep T&S UTD, last obtained 12/14  - Monitor for S/S of bleeding or symptomatic anemia   - Low transfusion threshold to ensure adequate flap perfusion  - Monitor AM CBC      # Hx HTN   - BP currently stable   - Intermittently hypertensive   - Continue to hold home BP meds (Lisinopril, hydrochlorothiazide), likely until discharge unless consistently hypertensive  - Maintain BP of 110/60 minimum to ensure adequate flap perfusion        ·  Please avoid use of pressors as able               -> Recommended use of dobutamine and norepinephrine as indicated only        ·  For HTN, please avoid hydralazine if possible               -> Prefer use of calcium channel blockers as indicated for HTN (amlodipine)     # Insomnia  - 5mg melatonin PRN before bed    Prophylaxis:   - DVT: SQ Lovenox on POD#3 (12/15), ASA 81 once daily, SCDs  - Encourage IS x10 every hour while awake   - Bowel regimen: Colace 100mg BID      FEN/GI:   - DC IVMF 12/14  - Regular diet   - Monitor electrolytes and replete PRN   - Bowel regimen with Colace BID, Miralax, Doculax suppository  - IV Zofran PRN N/V    Dispo: Continue care on RNF. Will remain inpatient for continued flap monitoring and vac/drain surveillance postoperatively. Follow up appointment with plastic surgery department scheduled 12/28. Potential discharge today pending abdominal CT    Patient and plan discussed with Dr. Abrams, PA-C  Plastic and Reconstructive Surgery  Epic chat,  Pager [phone], Team phones: [phone]

## 2023-12-19 ENCOUNTER — DOCUMENTATION (OUTPATIENT)
Dept: HOME HEALTH SERVICES | Facility: HOME HEALTH | Age: 53
End: 2023-12-19
Payer: COMMERCIAL

## 2023-12-19 ENCOUNTER — HOME HEALTH ADMISSION (OUTPATIENT)
Dept: HOME HEALTH SERVICES | Facility: HOME HEALTH | Age: 53
End: 2023-12-19
Payer: COMMERCIAL

## 2023-12-19 NOTE — HH CARE COORDINATION
Home Care received a Referral for Physical Therapy. We have processed the referral for a Start of Care on 12/20-12/21.     If you have any questions or concerns, please feel free to contact us at [phone]. Follow the prompts, enter your five digit zip code, and you will be directed to your care team on [facility] 1.

## 2023-12-19 NOTE — ANESTHESIA POSTPROCEDURE EVALUATION
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    Anesthesia Post Evaluation    Patient location during evaluation: PACU  Patient participation: complete - patient participated  Level of consciousness: awake and alert  Pain management: adequate  Airway patency: patent  Cardiovascular status: acceptable  Respiratory status: acceptable  Hydration status: acceptable  Postoperative Nausea and Vomiting: none        There were no known notable events for this encounter.     Yes

## 2023-12-20 NOTE — PROGRESS NOTES
Subjective   Patient ID: Lorraine Liu is a 53 y.o. female presenting for initial post operative visit following EMI flap breast reconstruction.    HPIJuedin Liu is a 53 y.o. female with a past medical history of meningioma and R breast cancer s/p mastectomy with immediate reconstruction via tissue expander placement on 3/19/2021. She presented for further R breast reconstruction via EMI free flap on 12/12/23 who presents today for post operative evaluation.     DEBBIE drain with minimal output <20cc's the past 2 days. Removed today without difficulty.     Area of erythema in media region of transverse abdominal incision began 3 days ago with pain. Pain has since resolved. She did not call our office as she thought it would be self-limiting.    Review of Systems    Denies fevers, chills, n/v    Objective   Physical Exam    Physical Exam     Constitutional- no acute distress  Cards- regular rate   Resp- nonlabored breathing on room air   R breast - incision healing well without signs of infection; area of scab, will let heal on its own  Abdomen- soft, not distended; medial region of transverse abdominal incision tender with well-demarcated region of erythema (7 cm x 0.1cm x 0.1cm). mild serosang drainage; washed with sterile saline on gauze, paint with bacitracin, and cover with Aquacel Ag, and ABD  Extremities- ambulating easily   Skin- warm, dry except noted above  Neuro- alert and oriented x3   Psych- appropriate mood   Tubes/lines- none - drain removed       Assessment/Plan   There are no diagnoses linked to this encounter.    Pt is a 52yo F with hx of R mastectomy with immediate tissue expander in 2021 who underwent EMI free flap 12/12/23 for reconstruction of R breast. She presents today for postop evaluation. Drain was pulled today. She has an area of well-demarcated erythema that began 3 days ago but she did not inform our team until today in clinic.     PLAN  - reinforced triggers for calling our  office  - send pictures of wound every 2 days to MyChart (Dr. Watkins or nurse)   - wound care as discussed above, start Bactrim 2 DS tablets BID x 7 days for cellulitis  - follow-up in clinic 1/8/24  - will plan for revision of R breast reconstruction and possible L breast for asymmetry, possible liposuction and fat grafting     Discussed extensively with patient.     Seen and discussed with Attending, Dr. Watkins.     Prosper Oakley MD   Plastic Surgery

## 2023-12-21 NOTE — PROGRESS NOTES
Art Therapy Note    Lorraine Liu     Therapy Session  Referral Type: New referral this admission  Session Start Time: 1042  Session End Time: 1111  Intervention Delivery: In-person  Conflict of Service: None  Number of family members present: 1  Family Present for Session: Spouse/Significant Other  Family Participation: Supportive  Number of staff members present: 0              Treatment/Interventions  Areas of Focus: Self-expression, Normalization, Relaxation  Art Therapy Interventions: Assessment  Interruption: No  Patient Fell Asleep at End of Session: No    Post-assessment  Total Session Time (min): 29 minutes    Narrative  Assessment Detail: ATR made a visit to Pt.'s room to offer introduce and offer AT services per Pt request.  Pt was visiting withh er  who was sitting on the bedside couch.  Pt is a  and creative.  She was interested in possibly doing some art to provide meaning and distaction as she waits to potentially be DCw home. She shared her DC maybe delayed due to some results pending form a Cat scan done. Her Pt and  open about her surgery and healing process.  Pt was wanting to take a nap and asked if ATR could return later in the day to revisit AT interventions and begin something.  ATR will follow up with Pt in the afternoon to offer AT services.    Education Documentation  No documentation found.

## 2023-12-21 NOTE — PROGRESS NOTES
Art Therapy Note    Lorraine Liu     Therapy Session  Referral Type: New referral this admission  Visit Type: Follow-up visit  Session Start Time: 1554  Session End Time: 1646  Intervention Delivery: In-person  Conflict of Service: None  Number of family members present: 0  Family Present for Session: None  Family Participation: Supportive  Number of staff members present: 0              Treatment/Interventions  Areas of Focus: Emotional support  Art Therapy Interventions: Empathic listening/validating emotions  Interruption: Yes (pharmacy and RN)  Interrupted by: Staff (Pharmacy to discuss and give DC meds and RN to inform of DC and start prepping to leave)  Interruption Duration (min): 15 minutes  Interruption Outcome: Session resumed  Patient Fell Asleep at End of Session: No    Post-assessment  Total Session Time (min): 52 minutes    Narrative  Assessment Detail: ATR made afoillow up visit to Pt.'s room to offer an AT session and allow her to explore the AT cart for inspiration.  Pt excited to see ATR and reported DC was going to happen and not be delayed like she thought during the morning visit.  She declined an session due to prepping for DC. however she asked ATR to stay and talk and watch a bit of a movie she was well into.  She was waiting for her husbands return to get her.  He had left earlier inthe day thinking she was not leaving. Pt was concerned about his safety given the snow storm happening upon his return to the hospital. ATR stayed, offered presence and validation of feelings about DC information being discussed by pharmacist and RN during visit until her spouse returned.  Pt and spouse expressed appreciation for visits made.  No follow up services is needed at this time..    Education Documentation  No documentation found.

## 2023-12-22 ENCOUNTER — HOME CARE VISIT (OUTPATIENT)
Dept: HOME HEALTH SERVICES | Facility: HOME HEALTH | Age: 53
End: 2023-12-22

## 2023-12-22 ENCOUNTER — APPOINTMENT (OUTPATIENT)
Dept: HOME HEALTH SERVICES | Facility: HOME HEALTH | Age: 53
End: 2023-12-22
Payer: COMMERCIAL

## 2023-12-22 DIAGNOSIS — I10 HYPERTENSION, UNSPECIFIED TYPE: ICD-10-CM

## 2023-12-28 ENCOUNTER — OFFICE VISIT (OUTPATIENT)
Dept: PLASTIC SURGERY | Facility: CLINIC | Age: 53
End: 2023-12-28
Payer: COMMERCIAL

## 2023-12-28 VITALS
RESPIRATION RATE: 16 BRPM | BODY MASS INDEX: 32.82 KG/M2 | SYSTOLIC BLOOD PRESSURE: 121 MMHG | HEART RATE: 96 BPM | HEIGHT: 65 IN | WEIGHT: 197 LBS | DIASTOLIC BLOOD PRESSURE: 84 MMHG

## 2023-12-28 DIAGNOSIS — L03.311 CELLULITIS OF ABDOMINAL WALL: Primary | ICD-10-CM

## 2023-12-28 DIAGNOSIS — Z98.890 STATUS POST BREAST RECONSTRUCTION: ICD-10-CM

## 2023-12-28 PROCEDURE — 3074F SYST BP LT 130 MM HG: CPT

## 2023-12-28 PROCEDURE — 1036F TOBACCO NON-USER: CPT

## 2023-12-28 PROCEDURE — 3079F DIAST BP 80-89 MM HG: CPT

## 2023-12-28 PROCEDURE — 99024 POSTOP FOLLOW-UP VISIT: CPT

## 2023-12-28 RX ORDER — METHOCARBAMOL 500 MG/1
500 TABLET, FILM COATED ORAL EVERY 6 HOURS SCHEDULED
Qty: 12 TABLET | Refills: 0 | Status: SHIPPED | OUTPATIENT
Start: 2023-12-28 | End: 2024-02-05 | Stop reason: WASHOUT

## 2023-12-28 RX ORDER — GABAPENTIN 300 MG/1
600 CAPSULE ORAL EVERY 12 HOURS
Qty: 28 CAPSULE | Refills: 0 | Status: SHIPPED | OUTPATIENT
Start: 2023-12-28 | End: 2024-02-05 | Stop reason: WASHOUT

## 2023-12-28 RX ORDER — TRAMADOL HYDROCHLORIDE 50 MG/1
50 TABLET ORAL EVERY 6 HOURS PRN
Qty: 12 TABLET | Refills: 0 | Status: SHIPPED | OUTPATIENT
Start: 2023-12-28 | End: 2023-12-31

## 2023-12-28 RX ORDER — SULFAMETHOXAZOLE AND TRIMETHOPRIM 800; 160 MG/1; MG/1
320 TABLET ORAL EVERY 12 HOURS SCHEDULED
Status: DISCONTINUED | OUTPATIENT
Start: 2023-12-28 | End: 2023-12-28

## 2023-12-28 RX ORDER — LISINOPRIL AND HYDROCHLOROTHIAZIDE 12.5; 2 MG/1; MG/1
2 TABLET ORAL DAILY
Qty: 60 TABLET | Refills: 0 | Status: SHIPPED | OUTPATIENT
Start: 2023-12-28 | End: 2024-01-22

## 2023-12-28 RX ORDER — AMOXICILLIN AND CLAVULANATE POTASSIUM 875; 125 MG/1; MG/1
1 TABLET, FILM COATED ORAL 2 TIMES DAILY
Qty: 14 TABLET | Refills: 0 | Status: SHIPPED | OUTPATIENT
Start: 2023-12-28 | End: 2024-01-04 | Stop reason: SDUPTHER

## 2023-12-29 NOTE — OP NOTE
Release Contracture Torso (R), Removal Tissue Expander (R), Capsulotomy Breast (R), Reconstruction Breast with Deep Inferior Epigastric  Flap (R) Operative Note     Date: 2023  OR Location: [facility] OR    Name: Lorraine Liu, : 1970, Age: 53 y.o., MRN: [id number], Sex: female    Diagnosis  Pre-op Diagnosis     * Acquired absence of right breast and nipple [Z90.11] Post-op Diagnosis     * Acquired absence of right breast and nipple [Z90.11]     Procedures  Release Contracture Torso  33225 - [place] PREP SITE TRUNK/ARM/LEG 1ST 100 SQ CM/1PCT    Removal Tissue Expander  45196 - [place] REMOVAL TISSUE EXPANDER W/O INSERTION IMPLANT    Capsulotomy Breast  88387 - [place] REVISION DEONTE-IMPLANT CAPSULE BREAST    Reconstruction Breast with Deep Inferior Epigastric  Flap  90701 - [place] BREAST RECONSTRUCTION W/FREE FLAP      Surgeons      * Leigh Watkins - Primary     * Kimberli Medina    Resident/Fellow/Other Assistant:  Surgeon(s) and Role:     * CORINNA Grover - Assisting     * Kimberli Medina PA-C    Procedure Summary  Anesthesia: General  ASA: III  Anesthesia Staff: Anesthesiologist: Tex Solares MD; Star Medina MD; Aaron Laurent MD; Jose Yates MD  CRNA: Azar Berman, DEEPAK-CRNA; DEEPAK Truong-CRNA  C-AA: NATHALIA Berkowitz; NATHALIA Nicole; NATHALIA Duran  Estimated Blood Loss: 100mL  Intra-op Medications:   Medication Name Total Dose   papaverine injection 60 mg   heparin (porcine) 5,000 Units in sodium chloride 0.9% 100 mL irrigation 5,000 Units   sodium chloride 0.9 % irrigation solution 1,000 mL   lisinopril 20 mg, hydroCHLOROthiazide 12.5 mg for Zestoretic/Prinizide Cannot be calculated         Intraprocedure I/O Totals       None           Specimen:   ID Type Source Tests Collected by Time   1 : RIGHT BREAST CAPSULE AND RIGHT MASTECTOMY SKIN Tissue BREAST MASTECTOMY RIGHT SURGICAL PATHOLOGY EXAM Leigh Watkins MD 2023 1056        Staff:   Circulator:  Kevin Chin RN; Medardo Bellamy RN  Relief Circulator: Kevin Chin RN  Relief Scrub: [place] Jose  Scrub Person: Arleen Ulrich; Kain Lal RN         Drains and/or Catheters:   Closed/Suction Drain Inferior;Lateral;Right Chest Bulb 15 Fr. (Active)   Site Description Healing 12/18/23 1518   Dressing Status Clean;Dry 12/18/23 1518   Drainage Appearance Serosanguineous 12/18/23 0430   Status To bulb suction 12/18/23 0720   Sutures Removed Intact Yes 12/13/23 0905   Output (mL) 40 mL 12/18/23 1406       [REMOVED] Urethral Catheter Non-latex 16 Fr. (Removed)   Site Assessment Clean;Skin intact 12/17/23 0754   Collection Container Standard drainage bag 12/17/23 0754   Securement Method Securing device (Describe) 12/17/23 0754   Reason for Continuing Urinary Catheterization surgical procedures: urological/gynecological, pelvic oncology, anal, prolonged surgical procedure 12/15/23 2015   Output (mL) 550 mL 12/17/23 1100     Indication: Ms. Liu is a 53 Years old female patient who is status post right side mastectomy with inferior wise reduction pattern, and immediate PREPEC TE (SIZE 480-575cc) reconstruction with ADM and adipofascial flap coverage with REED VAC on 3/19/2021. The patient is interested in autologous breast reconstruction, and she requested EMI flap reconstruction. CT abdomen and pelvis showed good DIEA perforators anatomy permissive for EMI flap. Indications, contraindications, alternatives, and risks were thoroughly discussed with patient who expressed good understanding and wanted to proceed with surgery.    She is indicated today for phase two of her reconstruction with free EMI flap.     Plan: After reviewing her perforators anatomy on CTA of abdomen and pelvis, and noting that the right side perforators were more developed on this CT, I believe right side EMI flap is possible based on lateral row perforators.     CONSENT: I reviewed Ms. Liu's medical history, and her abdominal CTA,  and I met with her and evaluated her, and discussed with her and  the findings of interest to our planned procedure today, and recommendation for surgery. I thoroughly discussed with her pros and cons of our planned procedure, and possible risks of infection,  bleeding and hematoma, seroma, at the abdomen or the breast, injury to surrounding tissue, abdominal wall weakness and hernia, free flap complications ( vascular compromise requiring take back, total or partial flap necrosis with a second free flap/local flap or additional debridement with/without local tissue rearrangement), dissatisfaction with outcomes, need for revision surgeries, complex chronic pain syndrome. The risks discussed also included wound healing problems. Medical complications arising from general anesthesia or surgery itself including reaction to medication, atelectasis, ileus, PE and DVT, cardiac complications and up to mortality were reviewed. The need for blood transfusion, blood thinners, and their complications were adequately covered. Specific discussion of the risks of the anesthetic plan will be discussed by the patient's anesthesia providers. All risks were reviewed in layman´s terms. The patient was given ample time to ask appropriate questions and appeared to be satisfied with the answers provided. All questions were answered and no guarantees have been given regarding the patient's condition and treatment recommendations. The general plan for the postoperative rehabilitation course, including possible need for therapy, was reviewed at great length. Informed consent was signed by all appropriate parties (myself, and my patient). The surgical site was marked in ink by myself.    Findings (CTA): Adequate right side lateral rows perforators from the right DIEA were present.     Description of procedure in detail:   The patient was brought to operating theatre on her bed. As soon as she entered the room, I performed safety  benji and verified patient's ID, MRN, , planned procedure, surgical sites, and reviewed allergies history, and need for antibiotics. All in room were in agreement. Patient was then transferred to OR table, positioned supine with arms at 90 degree with her trunk, supported with arm boards. She was held securely with strap, and all bony prominences were well padded to avoid pressure sores. The surgical sites: Chest, and abdomen were prepped and draped in standard fashion.   Time out was then performed and incision was announced.  Patient received antibiotics before incision.     I started EMI flap harvest as follows:     The inferior incision was made first, and right SIEVs was identified and prepared for 5 cm, then clipped. After that the inferior incision was deepen down to the level of abdominal wall fascia. Then I started the lateral and superior incision on the right side, my dissection proceeded in the suprafascial plane with the aid of [place]-tip Bovie. The dissection proceeded rapidly towards my target perforators (2 perfroators) which were located with ease. Once located, I opened the fascia and rectus sheath through the perforating slit of each  connecting them together exposing the rectus abdominis muscle, the muscle was opened along the direction of its fibers during the dissection of both perforators, which were freed using bipolar and mela tentotmy scissor. All side branches were clipped with small and medium sized hemoclips, until the DIEA was reached, papverine was used freely during perforators and pedicle dissection. The DIEA was dissected until the lateral border of the muscle, as many segmental motor nerves were preserved as possible. The DIEA was further dissected lateral to the rectus muscle border without further division of the fascia. After completing the vascular work on the right side, the umbilicus was isolated from the abdominal tissue. Then the left side flap was  dissected rapidly over the abdominal wall fascia in similar manner, with meticulous control of all adnominal wall perforators with hemoclips.  By the end of this step, the flap was completely dissected off the abdomen, was only perfused by the DIEA/v, warm irrigation with saline followed by papaverine was performed to relief any spasm, and then the pedicle was examined thoroughly along its course to make sure that there was no bleeding from the pedicle, or injury, or spasm. Discrete pulsation of the entire pedicle until their entry to subcutaneous tissue was confirmed.  10 mg ICG was given IV and flushed with saline and the flap was spied, the flap skin paddle lit up completely except for zone IV. Pedicle length was 12 cm.  Zone IV was excised.     Then I shifted my focus to- mastectomy skin and recipient vessels. I first excised 14x3.5 sq cm ellipse of mastectomy scar with 15 blade scalpel and bovie. Next, I dissected the tissue expander with its capsule and ADM off the pectoralis muscle using bovie. The pocket was further dissected to create enough space for the flap. Meticulous hemostasis was maintained using bipolar.  Then, the third rib was exposed and its cartilage was removed to expose the ABIMBOLA and IMVs which appeared healthy and sizeable. Next, anchoring sutures with 2/0 PDS were placed to reconstruct the lateral border of the breast in lazy S fashion. Similar sutures were placed to reinforce the IMF which was made 2 cm higher than the contralateral part to accommodate its downward migration when the abdominal wound is closed.     The flap was divided and transferred to the breast.  The flap was oriented in vertical direction and the superomedial aspect was placed inferomedially on chest. The shape and size of the breast was compared to the contralateral breast and was found to be good. The location of the skin paddle was marked. The flap was removed and depeithelialized on side table except for the skin  paddle which was to be externalized. The flap was inset, its medial aspect now over the recipient side was reflected to allow room for microanastomoses.   I repaired the DIEV first to the IMV, followed by the artery. The following technique was used for microanastomses: With the aid of double approximation clamps (Hd-S), the flap vessel and IM vessel were brought into soft touch, all vessels had matching size ( artery was 2 mm, vein 2 mm as well), the adventitia was trimmed with curved micro scissor, then end-to-end repair with 9/0 running open loop was performed. After the completion of anastomosis, it was inspected for leak or soft thrombus formation, and there was none. When I first did the DIEV to IMV, outflow from IMV though the flap vein helped inspecting anastomosis patency and proofing, then the artery established perfusion and out flow from the other DIEV and SIEV was noted. Ischemia time was 13:01-15:35pm. In all of the anastomoses done on the artery and vein, prior to suturing the back wall, the first wall was inspected to make sure that all bites were full thickness and that the posterior wall was free. After microanastomses were all completed, vessels arrangement was optimized to avoid kink or twist. Hemostasis was checked again at the defect. Flap inset was then completed. Then I spied the flap again, and it was adequately perfused. 15 fr drain as placed along the inferior border of the flap to drain the dependent site and anastomoses region.     Finally, abdominal wall closure was performed. The rectus muscle fibers were repaired with buried 2-0 PDS figure-8 sutures.  The anterior abdominal wall fascia including the anterior rectus sheath was closed with figure of 8 first then running sutures with 2/0 PODS (the fascial wound measured 10 cm). The abdominal flap was then dissected cephalad to the xiyphoid, and then the patient was placed in 20 degrees reflex position. Diastasis of the rectus abdominis  was then repaired using 2/0 stratafix, above and below the umbilicus, in two independent sections. Next, the upper abdominal flap was advanced downwards, and progressive quilting sutures with 2/0 monocryl were used at the epigastrium area above the umbilicus. The inferior transverse line was closed temporarily with staples, the umbilicus new location was then marked, and a kite-like window was drawn and excised to facilitate delivery of the umbilicus, which was then inset with key 2/0 PDS suture to Moises's at 6 o'clock. Next, the staples were removed, and progressive quilting sutures with double armed 2/0 stratafix were used bilaterally to progressively tuck the abdominal flap to underlying abdominal wall fascia on both sides of the linea alba eliminating dead space, and relieving any tension on the inferior suture line. Then the same stratafix sutures were used to close the camper's fascia on both ends of the inferior wound, repairing Camper's-to-Moises'q-tz-Mawzjaz's in lateral to midline direction. After that, 3/0 monocryl was used for deep dermal repair, and 4/0 monocryl for subcuticular repair. Next, the repair of the umbilicus was completed. 3/0 monocryl were used for dep dermal repair, and 4/0 monocryl for subcuticular repair.     Incisional wound VAC was applied over the wound, setting 125 mmHG continuous.     The patient was successfully extubated and transferred to PACU in stable condition and viable flap.     Post-operative plan:  Patient to stay NPO overnight, PCA, IV fluids to maintain 1ml/kg/h urine output, keep Horvath for 3 days, 5000 units heparin SQ q8 changed to Lovenox on post-operative day 3 to 5, Aspirin 81 mg postoperative day 1 to 30, multi modal pain management, Ancef for 24 hours, she is under strict bed rest for 2 days. She is to be beach chair positon to avoid tension on abdominal wound.  Avoid direct pressure on the flap. The wound will be dressed daily with strips of xeroform keeping the  flap surface only covered loosely with ABD. Bear hugger will be used to keep patient warm (38 for 5 hours discontinued for 1 hour for 3 days).  Flap check (color and turgor, temperature, and Doppler signal) will be checked q1 for the first 12 hours then q2 for the next 24 hours then q4 after wards,  location was marked with prolene suture. CBC and then blood transfusion if HG is below 9, or when clinically indicated. Monitor VAC and drains and record output.     Additional Details: Iris Travis and Kimberli Craven served as first assistants. Both worked at two different anatomic regions during the procedure which involved the breast and abdomen.  A qualified resident physician was not available.     Attending Attestation: I performed the procedure.    Leigh Watkins  Phone Number: [phone]

## 2024-01-04 DIAGNOSIS — L03.311 CELLULITIS OF ABDOMINAL WALL: ICD-10-CM

## 2024-01-04 RX ORDER — AMOXICILLIN AND CLAVULANATE POTASSIUM 875; 125 MG/1; MG/1
1 TABLET, FILM COATED ORAL 2 TIMES DAILY
Qty: 14 TABLET | Refills: 0 | Status: SHIPPED | OUTPATIENT
Start: 2024-01-04 | End: 2024-01-11

## 2024-01-05 NOTE — PROGRESS NOTES
Plastic Surgery Clinic Visit    Patient Name: Lorraine Liu  MRN: [id number]  Date:  01/05/24     History of Present Illness  Lorraine Liu is a 53 y.o. female with a past medical history of of meningioma and R breast cancer s/p mastectomy with immediate reconstruction via tissue expander placement on 3/19/2021. S/P R breast reconstruction via EMI free flap on 12/12/23 with Dr. Watknis of Plastic Surgery. Her DEBBIE drain was removed in clinic on 12/28/23 without difficulty. She has been having wound concerns to which she has been treating with daily cleanings and aquacel AG dressing changes. Her Augmentin was extended one week for infection prophylaxis. She is here today for a wound evaluation.     Subjective  Patient states she is overall doing well. States that she has been having extreme difficulty sleeping and has tried melatonin and chamomile teas with no success. She also has complaints of burping without symptomatic heartburn and no complaints of reflux. She also has concerns of abdominal contour irregularity on the left abdomen with tenderness present. Denies any fever, chills, night sweats, CP, SOB, palpitations, nausea, vomiting.    Past Medical History:   Diagnosis Date    Chest pain     Saw Dr. Mclaughlin in 05/2022    Ductal carcinoma of breast (CMS/HCC) 01/2021    Hypertension     F/W PCP    Melanoma (CMS/HCC)     Meningioma (CMS/HCC)     Shortness of breath     Ct Calcium Scoring 3/18/2022: IMPRESSION: Coronary artery calcium score of  0.    Stress incontinence      Past Surgical History:   Procedure Laterality Date    BI BREAST CYST ASPIRATION LEFT Left 07/19/2016    BI BREAST CYST ASPIRATION LEFT [facility]    BI MAMMO GUIDED LOCALIZATION BREAST RIGHT Right 02/10/2021    BI MAMMO GUIDED LOCALIZATION BREAST RIGHT 2/10/2021 [facility] CLINICAL LEGAC[facility]    BI STEREOTACTIC GUIDED BREAST RIGHT LOCALIZATION AND BIOPSY Right 01/11/2021    BI STEREOTACTIC GUIDED BREAST LOCALIZATION AND BIOPSY RIGHT MARTINE  CLINICAL LEGACY    BI US GUIDED BREAST LOCALIZATION AND BIOPSY RIGHT Right 2021    BI US GUIDED BREAST LOCALIZATION AND BIOPSY RIGHT [facility]    BREAST LUMPECTOMY Right 02/10/2021     SECTION, LOW TRANSVERSE      x2    CT ABDOMEN PELVIS ANGIOGRAM W AND/OR WO IV CONTRAST  2023    CT ABDOMEN PELVIS ANGIOGRAM W AND/OR WO IV CONTRAST 2023 [id number] CT    HYSTERECTOMY      d/t heavy menses    MASTECTOMY Right     OTHER SURGICAL HISTORY  2021    Breast reconstruction    OTHER SURGICAL HISTORY      Melanoma Excision     No Known Allergies    Current Outpatient Medications:     amoxicillin-pot clavulanate (Augmentin) 875-125 mg tablet, Take 1 tablet (875 mg) by mouth 2 times a day for 7 days., Disp: 14 tablet, Rfl: 0    aspirin 81 mg EC tablet, Take 1 tablet (81 mg) by mouth once daily for 24 days., Disp: 24 tablet, Rfl: 0    gabapentin (Neurontin) 300 mg capsule, Take 2 capsules (600 mg) by mouth every 12 hours for 7 days., Disp: 28 capsule, Rfl: 0    lidocaine (Lidoderm) 5 % patch, Place 1 patch over 12 hours on the skin once daily for 4 days. Remove & discard patch within 12 hours or as directed by MD. Do not start before 2023. (Patient not taking: Reported on 2023), Disp: 4 patch, Rfl: 0    lisinopriL-hydrochlorothiazide 20-12.5 mg tablet, Take 2 tablets by mouth once daily, Disp: 60 tablet, Rfl: 0    methocarbamol (Robaxin) 500 mg tablet, Take 1 tablet (500 mg) by mouth every 6 hours for 3 days., Disp: 12 tablet, Rfl: 0   Family History   Problem Relation Name Age of Onset    Diabetes Mother      Melanoma Father      Thyroid cancer Father      Melanoma Brother      Leukemia Brother      Other (brain tumor) Brother       Social History     Tobacco Use    Smoking status: Never    Smokeless tobacco: Never   Vaping Use    Vaping Use: Never used   Substance Use Topics    Alcohol use: Never    Drug use: Never     Review of Systems   Constitutional:  Positive  for fatigue. Negative for chills, diaphoresis and fever.        Insomnia   HENT:  Negative for ear pain, facial swelling, sinus pressure and sinus pain.    Eyes:  Negative for pain and visual disturbance.   Respiratory:  Negative for cough, chest tightness and shortness of breath.    Cardiovascular:  Negative for chest pain.   Gastrointestinal:  Negative for abdominal distention, constipation, diarrhea, nausea and vomiting.        Abdominal tenderness present. +belching   Endocrine: Negative for polyuria.   Genitourinary:  Negative for difficulty urinating and dysuria.   Musculoskeletal:  Negative for myalgias.   Skin:  Negative for rash.   Neurological:  Negative for tremors, weakness and numbness.   Psychiatric/Behavioral:  Negative for agitation and confusion.         Objective    Physical Exam  Constitutional:       General: She is awake. She is not in acute distress.     Appearance: Normal appearance.   HENT:      Head: Normocephalic and atraumatic.      Right Ear: External ear normal.      Left Ear: External ear normal.      Nose: Nose normal.      Mouth/Throat:      Mouth: Mucous membranes are moist.      Pharynx: Oropharynx is clear.   Eyes:      General: Vision grossly intact.      Extraocular Movements: Extraocular movements intact.   Cardiovascular:      Rate and Rhythm: Normal rate and regular rhythm.   Pulmonary:      Effort: Pulmonary effort is normal. No respiratory distress.   Chest:      Comments: Right breast with well perfused EMI flap. Suture line intact and well approximated. Small 2 cm scab present in the RUQ of the breast.  Abdominal:      General: Abdomen is flat. There is no distension.      Palpations: Abdomen is soft.      Tenderness: There is no abdominal tenderness. There is no guarding.      Comments: Abdominal suture line intact with a minor area in the midline of a superficial wound with sloughing noted in the wound. She has a few small areas of superficial open wounds on the right  abdominal incision line. She has a minor rash round her incision with raised red bumps. Her umbilicus is intact with minor erythema around the incision. She has a contour irregularity of the left abdomen which is tender to palpation.   Musculoskeletal:         General: No swelling, tenderness or deformity. Normal range of motion.      Cervical back: Neck supple.   Skin:     General: Skin is warm and dry.      Capillary Refill: Capillary refill takes less than 2 seconds.   Neurological:      General: No focal deficit present.      Mental Status: She is alert and oriented to person, place, and time. Mental status is at baseline.      Motor: No weakness.   Psychiatric:         Mood and Affect: Mood normal.         Behavior: Behavior normal. Behavior is cooperative.          Diagnostics   CT abdomen pelvis w IV contrast    Result Date: 12/19/2023  Interpreted By:  Robinson Ortega and Benza Andrew STUDY: CT ABDOMEN PELVIS W IV CONTRAST;  12/17/2023 7:17 pm   INDICATION: Signs/Symptoms:potential free fluid/collection in right upper quadrant, left lower quadrant.   COMPARISON: CTA abdomen pelvis dated 06/22/2023   ACCESSION NUMBER(S): [id number]   ORDERING CLINICIAN: NICOLA LUNA   TECHNIQUE: CT of the abdomen and pelvis was performed.  Standard contiguous axial images were obtained at 3 mm slice thickness through the abdomen and pelvis. Coronal and sagittal reconstructions at 3 mm slice thickness were performed.   90 ml of contrast Omnipaque 350 were administered intravenously without immediate complication.   FINDINGS: LOWER CHEST: There are postsurgical changes of right breast EMI flap reconstruction. There is subcutaneous emphysema and fat stranding in the superior right breast. There is a fluid collection in the right medial breast measuring 3.9 x 1.7 cm (series 201, image 16). A surgical drain is present in the right breast, with the tip of the drain not abutting the aforementioned fluid  collection. There are trace bilateral pleural effusions with adjacent atelectasis. The heart is normal in size without pericardial effusion. No pleural effusion is present. Visualized distal esophagus appears normal.   ABDOMEN:   LIVER: The liver is normal in size and demonstrates diffusely decreased attenuation suggesting steatosis. There is a 0.9 cm fluid attenuating lesion in hepatic segment VIII which is favored to represent a simple cyst.   BILE DUCTS: The intrahepatic and extrahepatic ducts are not dilated.   GALLBLADDER: The gallbladder is nondistended and without evidence of radiopaque stones.   PANCREAS: The pancreas appears unremarkable without evidence of ductal dilatation or masses.   SPLEEN: The spleen is normal in size without focal lesions.   ADRENAL GLANDS: Bilateral adrenal glands appear normal.   KIDNEYS AND URETERS: The kidneys are normal in size and enhance symmetrically.  No hydroureteronephrosis or nephroureterolithiasis is identified. A 0.9 cm hypodense lesion in the lower right kidney is favored to represent a simple cyst.   PELVIS:   BLADDER: There is a focus of gas within the bladder. Correlate with history of recent instrumentation. The urinary bladder appears normal without abnormal wall thickening.   REPRODUCTIVE ORGANS: No pelvic masses.   BOWEL: The stomach is unremarkable. The entirety of the duodenum is located in the right hemiabdomen, consistent with intestinal malrotation, with no signs of obstruction. The small bowel is not abnormally dilated. The large bowel demonstrates multiple diverticula without inflammation. The appendix is not definitely visualized. There is however no pericecal stranding or fluid.   VESSELS: There is no aneurysmal dilatation of the abdominal aorta. The IVC appears normal.   PERITONEUM/RETROPERITONEUM/LYMPH NODES: There is no free or loculated fluid collection, no free intraperitoneal air. The retroperitoneum appears normal.  No abdominopelvic  lymphadenopathy is present.   BONES AND ABDOMINAL WALL: No suspicious osseous lesions are identified. Degenerative discogenic disease is noted in the lower thoracic and lumbar spine.  There is fat stranding of the lower anterior abdominal wall with foci of subcutaneous emphysema. No measurable fluid collection is evident.       1.  Postsurgical changes of right breast EMI flap reconstruction with subcutaneous emphysema and fluid collection as measured above. Sterility of this collection can not be determined on CT alone. A surgical drain present in the right breast does not abut this fluid collection. 2. Postsurgical changes of the lower anterior abdominal wall with subcutaneous emphysema but no measurable fluid collection. 3. Hepatic steatosis. 4. Trace bilateral pleural effusions with adjacent atelectasis. 5. Additional findings as above.   I personally reviewed the images/study and I agree with the findings as stated above by resident physician, Tex Posey MD. This study was interpreted at [place], [place].   MACRO: None   Signed by: Robinson Marquez 12/19/2023 1:34 AM Dictation workstation:   [id number]    US abdomen limited    Result Date: 12/17/2023  Interpreted By:  Panchito Martini and Bera Kaustav STUDY: US ABDOMEN LIMITED;  12/17/2023 3:17 pm   INDICATION: Signs/Symptoms:Left upper/lower quadrant abdomen taught with tenderness/edema.   COMPARISON: None.   ACCESSION NUMBER(S): [id number]   ORDERING CLINICIAN: NICOLA LUNA   TECHNIQUE: Limited 4 quadrant grayscale and color Doppler ultrasound of the abdomen was performed to evaluate for fluid   FINDINGS: Limited examination due to overlying bowel and body habitus.   There are few perihepatic echogenic foci, which can represent free air.   There is suggestion of complex fluid with surrounding fat stranding and edema seen within the right upper quadrant, inferior to the liver as  well as within the left lower quadrant.       Limited examination due to overlying bowel and body habitus, with suggestion of free fluid/fluid collections within the right upper quadrant and left lower quadrant, as well as possible pneumoperitoneum within the right upper quadrant. If there is clinical concern, recommend radiograph of the abdomen or CT abdomen pelvis for further evaluation.   I personally reviewed the images/study and I agree with the findings as stated. This study was interpreted at [facility], [place], [place].   MACRO: None   Signed by: Panchito Martini 12/17/2023 4:52 PM Dictation workstation:   [id number]    Vascular US upper extremity venous duplex left    Result Date: 12/15/2023            [place] [address]   Tel [phone] and Fax [phone]  Vascular Lab Report VASC US UPPER EXTREMITY VENOUS DUPLEX LEFT  Patient Name:      CARLOS Barron Physician: [id number] Tiana Lewis MD Study Date:        12/15/2023          Ordering           [id number] ROLAND QUINTERO                                        Physician: MRN/PID:           [id number]            Technologist:      Arely Melgoza T Accession#:        [id number]        Technologist 2: Date of Birth/Age: 1970 / 53      Encounter#:        [id number]                    years Gender:            F Admission Status:  Inpatient           Location           [facility]                                        Performed:  Diagnosis/ICD: Left arm swelling-M79.89  CONCLUSIONS: Right Upper Venous: The subclavian vein demonstrates a normal spontaneous and phasic flow. Left Upper Venous: No evidence of acute deep vein thrombus visualized in the left upper extremity.  Imaging & Doppler Findings:  Right             Flow Subclavian Spontaneous/Phasic  Left                Compress Thrombus         Flow Internal Jugular      Yes      None   Spontaneous/Phasic Subclavian            Yes      None Subclavian Proximal   Yes      None   Spontaneous/Phasic Subclavian Mid        Yes      None Subclavian Distal     Yes      None Axillary              Yes      None   Spontaneous/Phasic Brachial              Yes      None Cephalic              Yes      None Basilic               Yes      None  [id number] Tiana Lewis MD Electronically signed by [id number] Tiana Lewis MD on 12/15/2023 at 5:26:24 PM  ** Final **       Assessment/Plan  Lorraine Liu is a 53 y.o. female with a past medical history of with a past medical history of of meningioma and R breast cancer s/p mastectomy with immediate reconstruction via tissue expander placement on 3/19/2021. S/P R breast reconstruction via EMI free flap on 12/12/23 with Dr. Watkins of Plastic Surgery. Her DEBBIE drain was removed in clinic on 12/28/23 without difficulty. She has been having wound concerns to which she has been treating with daily cleanings and aquacel AG dressing changes. Her Augmentin was extended one week for infection prophylaxis. She is here today for a wound evaluation. .     - Continue with local wound care     ->Daily showers with antibacterial (non-fragrance) soap     ->Daily to twice daily dressing changes with baci and aquacel AG  - Continue to wear abdominal binder  - Can side sleep  - Continue lifting restrictions  - Continue oral augmentin x 10 days  - Benadryl over the counter at night for wound rash and sleeping aid  - Begin omeprazole for reflux  - [facility] (Dr. Watkins or nurse)   - will plan for revision of R breast reconstruction and possible L breast for asymmetry, possible liposuction and fat grafting once wound has fully healed  - Follow up in one week on 1/15 for wound re evaluation      DEEPAK Turk-CNP/RNFA  Plastic and Reconstructive Surgery

## 2024-01-08 ENCOUNTER — OFFICE VISIT (OUTPATIENT)
Dept: PLASTIC SURGERY | Facility: CLINIC | Age: 54
End: 2024-01-08
Payer: COMMERCIAL

## 2024-01-08 VITALS
BODY MASS INDEX: 32.78 KG/M2 | TEMPERATURE: 97.2 F | WEIGHT: 197 LBS | SYSTOLIC BLOOD PRESSURE: 102 MMHG | DIASTOLIC BLOOD PRESSURE: 82 MMHG | OXYGEN SATURATION: 98 % | HEART RATE: 93 BPM

## 2024-01-08 DIAGNOSIS — K29.60 REFLUX GASTRITIS: ICD-10-CM

## 2024-01-08 DIAGNOSIS — L03.311 CELLULITIS OF ABDOMINAL WALL: Primary | ICD-10-CM

## 2024-01-08 PROCEDURE — 15852 DRESSING CHANGE NOT FOR BURN: CPT | Performed by: NURSE PRACTITIONER

## 2024-01-08 PROCEDURE — 1036F TOBACCO NON-USER: CPT | Performed by: NURSE PRACTITIONER

## 2024-01-08 PROCEDURE — 3079F DIAST BP 80-89 MM HG: CPT | Performed by: NURSE PRACTITIONER

## 2024-01-08 PROCEDURE — 99214 OFFICE O/P EST MOD 30 MIN: CPT | Performed by: NURSE PRACTITIONER

## 2024-01-08 PROCEDURE — 3074F SYST BP LT 130 MM HG: CPT | Performed by: NURSE PRACTITIONER

## 2024-01-08 RX ORDER — OMEPRAZOLE 20 MG/1
20 TABLET, DELAYED RELEASE ORAL
Qty: 30 TABLET | Refills: 3 | Status: SHIPPED | OUTPATIENT
Start: 2024-01-08 | End: 2024-02-05 | Stop reason: WASHOUT

## 2024-01-08 RX ORDER — AMOXICILLIN AND CLAVULANATE POTASSIUM 875; 125 MG/1; MG/1
1 TABLET, FILM COATED ORAL 2 TIMES DAILY
Qty: 20 TABLET | Refills: 0 | Status: SHIPPED | OUTPATIENT
Start: 2024-01-08 | End: 2024-01-18

## 2024-01-08 ASSESSMENT — ENCOUNTER SYMPTOMS
FACIAL SWELLING: 0
SHORTNESS OF BREATH: 0
CHILLS: 0
DIFFICULTY URINATING: 0
DYSURIA: 0
CHEST TIGHTNESS: 0
ABDOMINAL DISTENTION: 0
AGITATION: 0
WEAKNESS: 0
CONFUSION: 0
SINUS PRESSURE: 0
DIAPHORESIS: 0
COUGH: 0
NUMBNESS: 0
VOMITING: 0
DIARRHEA: 0
CONSTIPATION: 0
MYALGIAS: 0
TREMORS: 0
EYE PAIN: 0
NAUSEA: 0
FATIGUE: 1
SINUS PAIN: 0
FEVER: 0

## 2024-01-08 ASSESSMENT — PAIN SCALES - GENERAL: PAINLEVEL: 0-NO PAIN

## 2024-01-08 ASSESSMENT — VISUAL ACUITY: OU: 1

## 2024-01-08 NOTE — PATIENT INSTRUCTIONS
- Continue with local wound care     ->Daily showers with antibacterial (non-fragrance) soap     ->Daily to twice daily dressing changes with baci and aquacel AG  - Continue to wear abdominal binder  - Can side sleep  - Continue lifting restrictions  - Continue oral augmentin x 10 days  - Benadryl over the counter at night for wound rash and sleeping aid  - Begin omeprazole for reflux  - Follow up in 1 week

## 2024-01-11 NOTE — PROGRESS NOTES
Plastic Surgery Clinic Visit    Patient Name: Lorraine Liu  MRN: [id number]  Date:  01/11/24     History of Present Illness  Lorraine Liu is a 53 y.o. female with a past medical history of of meningioma and R breast cancer s/p mastectomy with immediate reconstruction via tissue expander placement on 3/19/2021. S/P R breast reconstruction via EMI free flap on 12/12/23 with Dr. Watkins of Plastic Surgery. Her DEBBIE drain was removed in clinic on 12/28/23 without difficulty. She has been having wound concerns to her abdomen to which she has been treating with daily cleanings and aquacel AG dressing changes. Her Augmentin was extended one week for infection prophylaxis when she came to clinic on 1/8/24 . She is here today for repeat wound evaluation.       Subjective  Patient states she is overall doing well. Has been doing wound care to her abdomen x2 a day. Notes sleeping at nighttime has improved as well as her reflux. Continues to note concerns of abdominal contour irregularity on the left abdomen with tenderness present. Notes bowel movement every other day. Denies any fever, chills, night sweats, CP, SOB, palpitations, nausea, vomiting.     Past Medical History:   Diagnosis Date    Chest pain     Saw Dr. Mclaughlin in 05/2022    Ductal carcinoma of breast (CMS/HCC) 01/2021    Hypertension     F/W PCP    Melanoma (CMS/HCC)     Meningioma (CMS/HCC)     Shortness of breath     Ct Calcium Scoring 3/18/2022: IMPRESSION: Coronary artery calcium score of  0.    Stress incontinence      Past Surgical History:   Procedure Laterality Date    BI BREAST CYST ASPIRATION LEFT Left 07/19/2016    BI BREAST CYST ASPIRATION LEFT [facility]CILL[facility]    BI MAMMO GUIDED LOCALIZATION BREAST RIGHT Right 02/10/2021    BI MAMMO GUIDED LOCALIZATION BREAST RIGHT 2/10/2021 [facility] CLINICAL LEGACY    BI STEREOTACTIC GUIDED BREAST RIGHT LOCALIZATION AND BIOPSY Right 01/11/2021    BI STEREOTACTIC GUIDED BREAST LOCALIZATION AND BIOPSY RIGHT MARTINE  CLINICAL LEGACY    BI US GUIDED BREAST LOCALIZATION AND BIOPSY RIGHT Right 2021    BI US GUIDED BREAST LOCALIZATION AND BIOPSY RIGHT [facility]    BREAST LUMPECTOMY Right 02/10/2021     SECTION, LOW TRANSVERSE      x2    CT ABDOMEN PELVIS ANGIOGRAM W AND/OR WO IV CONTRAST  2023    CT ABDOMEN PELVIS ANGIOGRAM W AND/OR WO IV CONTRAST 2023 [id number] CT    HYSTERECTOMY      d/t heavy menses    MASTECTOMY Right     OTHER SURGICAL HISTORY  2021    Breast reconstruction    OTHER SURGICAL HISTORY      Melanoma Excision     No Known Allergies    Current Outpatient Medications:     amoxicillin-pot clavulanate (Augmentin) 875-125 mg tablet, Take 1 tablet (875 mg) by mouth 2 times a day for 7 days., Disp: 14 tablet, Rfl: 0    amoxicillin-pot clavulanate (Augmentin) 875-125 mg tablet, Take 1 tablet (875 mg) by mouth 2 times a day for 10 days., Disp: 20 tablet, Rfl: 0    aspirin 81 mg EC tablet, Take 1 tablet (81 mg) by mouth once daily for 24 days., Disp: 24 tablet, Rfl: 0    gabapentin (Neurontin) 300 mg capsule, Take 2 capsules (600 mg) by mouth every 12 hours for 7 days., Disp: 28 capsule, Rfl: 0    lisinopriL-hydrochlorothiazide 20-12.5 mg tablet, Take 2 tablets by mouth once daily, Disp: 60 tablet, Rfl: 0    methocarbamol (Robaxin) 500 mg tablet, Take 1 tablet (500 mg) by mouth every 6 hours for 3 days., Disp: 12 tablet, Rfl: 0    omeprazole OTC (PriLOSEC OTC) 20 mg EC tablet, Take 1 tablet (20 mg) by mouth once daily in the morning. Take before meals. Do not crush, chew, or split., Disp: 30 tablet, Rfl: 3   Family History   Problem Relation Name Age of Onset    Diabetes Mother      Melanoma Father      Thyroid cancer Father      Melanoma Brother      Leukemia Brother      Other (brain tumor) Brother       Social History     Tobacco Use    Smoking status: Never    Smokeless tobacco: Never   Vaping Use    Vaping Use: Never used   Substance Use Topics    Alcohol use: Never     Drug use: Never       Objective    Physical Exam   Physical Exam:   Constitutional: NAD  Eyes: EOMI, clear sclera   ENMT: Moist mucous membranes, no apparent injuries or lesions  Head/Neck: NCAT  Cardiovascular: Extremities WWP  Respiratory/Thorax: Unlabored respirations on RA  Gastrointestinal: Abdomen soft, non-distended. Abdominal suture line intact with a minor area in the midline of a superficial wound with sloughing noted in the wound. She has a few small areas of superficial open wounds on the right abdominal incision line. She has a minor rash round her incision with raised red bumps. Her umbilicus is intact. She has a contour irregularity of the left abdomen which is tender to palpation.    Extremities: MAEx4  Neurological: A&Ox3  Psychological: Appropriate mood and behavior  Skin: Warm and dry with no lesions or rashes  Breast:  Right breast with well perfused EMI flap. Suture line intact and well approximated. Small 2 cm scab present in the RUQ of the breast.         Assessment/Plan  - Continue with local wound care. Abdominal wounds improved in appearence from last week     ->Daily showers with antibacterial (non-fragrance) soap     ->Incisional and jorge-incisional care: Bacitracin to abdominal incision in AM, wipe off and/or shower, then apply hydrocortisone in PM around the incision (not directly on incision)     ->Daily to twice daily dressing changes with aquacel AG (switched from Aquacel silver)  - Continue to wear abdominal binder  - Can side sleep  - Continue lifting restrictions  - DC augmentin and start bactrim x1 week  - Benadryl over the counter at night for wound rash and sleeping aid  - Omeprazole for reflux  - [facility]   - will plan for revision of R breast reconstruction and possible L breast for asymmetry, possible liposuction and fat grafting once wound has fully healed  - Follow up next week    Patient and plan discussed with  JOJO Marinelli-C  Plastic and Reconstructive Surgery

## 2024-01-15 ENCOUNTER — OFFICE VISIT (OUTPATIENT)
Dept: PLASTIC SURGERY | Facility: CLINIC | Age: 54
End: 2024-01-15
Payer: COMMERCIAL

## 2024-01-15 ENCOUNTER — APPOINTMENT (OUTPATIENT)
Dept: NEUROSURGERY | Facility: CLINIC | Age: 54
End: 2024-01-15
Payer: COMMERCIAL

## 2024-01-15 VITALS
DIASTOLIC BLOOD PRESSURE: 71 MMHG | SYSTOLIC BLOOD PRESSURE: 115 MMHG | OXYGEN SATURATION: 99 % | BODY MASS INDEX: 32.78 KG/M2 | TEMPERATURE: 97.4 F | HEART RATE: 87 BPM | WEIGHT: 197 LBS

## 2024-01-15 DIAGNOSIS — Z98.890 S/P BREAST RECONSTRUCTION: Primary | ICD-10-CM

## 2024-01-15 DIAGNOSIS — Z48.89 ENCOUNTER FOR POSTOPERATIVE WOUND CARE: ICD-10-CM

## 2024-01-15 PROCEDURE — 3078F DIAST BP <80 MM HG: CPT

## 2024-01-15 PROCEDURE — 99024 POSTOP FOLLOW-UP VISIT: CPT

## 2024-01-15 PROCEDURE — 1036F TOBACCO NON-USER: CPT

## 2024-01-15 PROCEDURE — 3074F SYST BP LT 130 MM HG: CPT

## 2024-01-15 RX ORDER — SULFAMETHOXAZOLE AND TRIMETHOPRIM 800; 160 MG/1; MG/1
1 TABLET ORAL 2 TIMES DAILY
Qty: 14 TABLET | Refills: 0 | Status: SHIPPED | OUTPATIENT
Start: 2024-01-15 | End: 2024-01-22

## 2024-01-15 RX ORDER — MAG HYDROX/ALUMINUM HYD/SIMETH 200-200-20
SUSPENSION, ORAL (FINAL DOSE FORM) ORAL 2 TIMES DAILY
Qty: 28 G | Refills: 0 | Status: SHIPPED | OUTPATIENT
Start: 2024-01-15 | End: 2024-02-05 | Stop reason: WASHOUT

## 2024-01-15 RX ORDER — BACITRACIN 500 [USP'U]/G
OINTMENT TOPICAL
Qty: 28 G | Refills: 0 | Status: SHIPPED | OUTPATIENT
Start: 2024-01-15 | End: 2024-02-05 | Stop reason: WASHOUT

## 2024-01-15 ASSESSMENT — PAIN SCALES - GENERAL: PAINLEVEL: 0-NO PAIN

## 2024-01-15 NOTE — PATIENT INSTRUCTIONS
- Continue with local wound care     ->Daily showers with antibacterial (non-fragrance) soap     ->Bacitracin in AM, wipe off and/or shower, then apply hydrocortisone in PM around the incision (not directly on incision)     ->Change wound care from Aquacel Ag to regular Aquacel BID  - Continue to wear abdominal binder  - Can side sleep  - Continue lifting restrictions  - DC augmentin and start bactrim x1 week  - Benadryl over the counter at night for wound rash and sleeping aid  - Omeprazole for reflux  - Send pictures of wound as needed to MyChart (Dr. Watkins or nurse)   - Follow up next week

## 2024-01-16 ENCOUNTER — OFFICE VISIT (OUTPATIENT)
Dept: OBSTETRICS AND GYNECOLOGY | Facility: CLINIC | Age: 54
End: 2024-01-16
Payer: COMMERCIAL

## 2024-01-16 VITALS
WEIGHT: 198.2 LBS | SYSTOLIC BLOOD PRESSURE: 130 MMHG | BODY MASS INDEX: 33.02 KG/M2 | DIASTOLIC BLOOD PRESSURE: 81 MMHG | HEIGHT: 65 IN

## 2024-01-16 DIAGNOSIS — Z01.419 ENCOUNTER FOR GYNECOLOGICAL EXAMINATION (GENERAL) (ROUTINE) WITHOUT ABNORMAL FINDINGS: Primary | ICD-10-CM

## 2024-01-16 LAB
BILIRUBIN, POC: NEGATIVE
BLOOD URINE, POC: NEGATIVE
CLARITY, POC: CLEAR
COLOR, POC: YELLOW
GLUCOSE URINE, POC: 50
KETONES, POC: NEGATIVE
LEUKOCYTE EST, POC: NEGATIVE
NITRITE, POC: NEGATIVE
PH, POC: 5
POC APPEARANCE OF BODY FLUID: CLEAR
SPECIFIC GRAVITY, POC: 1.02
URINE PROTEIN, POC: ABNORMAL
UROBILINOGEN, POC: NEGATIVE

## 2024-01-16 PROCEDURE — 88141 CYTOPATH C/V INTERPRET: CPT | Performed by: PATHOLOGY

## 2024-01-16 PROCEDURE — 99386 PREV VISIT NEW AGE 40-64: CPT | Performed by: OBSTETRICS & GYNECOLOGY

## 2024-01-16 PROCEDURE — 1036F TOBACCO NON-USER: CPT | Performed by: OBSTETRICS & GYNECOLOGY

## 2024-01-16 PROCEDURE — 3075F SYST BP GE 130 - 139MM HG: CPT | Performed by: OBSTETRICS & GYNECOLOGY

## 2024-01-16 PROCEDURE — 3079F DIAST BP 80-89 MM HG: CPT | Performed by: OBSTETRICS & GYNECOLOGY

## 2024-01-16 PROCEDURE — 88175 CYTOPATH C/V AUTO FLUID REDO: CPT | Mod: TC,GCY,WESLAB | Performed by: OBSTETRICS & GYNECOLOGY

## 2024-01-16 ASSESSMENT — PATIENT HEALTH QUESTIONNAIRE - PHQ9
1. LITTLE INTEREST OR PLEASURE IN DOING THINGS: NOT AT ALL
SUM OF ALL RESPONSES TO PHQ9 QUESTIONS 1 AND 2: 0
2. FEELING DOWN, DEPRESSED OR HOPELESS: NOT AT ALL

## 2024-01-16 ASSESSMENT — ENCOUNTER SYMPTOMS
LOSS OF SENSATION IN FEET: 0
OCCASIONAL FEELINGS OF UNSTEADINESS: 0
DEPRESSION: 0

## 2024-01-16 ASSESSMENT — PAIN SCALES - GENERAL: PAINLEVEL: 3

## 2024-01-16 NOTE — PROGRESS NOTES
Lorraine Liu is a 53 y.o.  who presents for her annual gynecologic exam     Complains: None    Menses:   menopause   Had hysterectomy     History of breast cancer , melonoma and meningioma     Urine frequency       History of abnormal pap: yes        OB History          2    Para   2    Term   2            AB        Living   2         SAB        IAB        Ectopic        Multiple        Live Births                   Past Medical History:   Diagnosis Date    Chest pain     Saw Dr. Mclaughlin in 2022    Ductal carcinoma of breast (CMS/HCC) 2021    Hypertension     F/W PCP    Melanoma (CMS/HCC)     Meningioma (CMS/HCC)     Shortness of breath     Ct Calcium Scoring 3/18/2022: IMPRESSION: Coronary artery calcium score of  0.    Stress incontinence      Past Surgical History:   Procedure Laterality Date    BI BREAST CYST ASPIRATION LEFT Left 2016    BI BREAST CYST ASPIRATION LEFT [facility]    BI MAMMO GUIDED LOCALIZATION BREAST RIGHT Right 02/10/2021    BI MAMMO GUIDED LOCALIZATION BREAST RIGHT 2/10/2021 [facility] CLINICAL LEGACY    BI STEREOTACTIC GUIDED BREAST RIGHT LOCALIZATION AND BIOPSY Right 2021    BI STEREOTACTIC GUIDED BREAST LOCALIZATION AND BIOPSY RIGHT [facility] CLINICAL LEGACY    BI US GUIDED BREAST LOCALIZATION AND BIOPSY RIGHT Right 2021    BI US GUIDED BREAST LOCALIZATION AND BIOPSY RIGHT [facility] CLINICAL LEGACY    BREAST LUMPECTOMY Right 02/10/2021     SECTION, LOW TRANSVERSE      x2    CT ABDOMEN PELVIS ANGIOGRAM W AND/OR WO IV CONTRAST  2023    CT ABDOMEN PELVIS ANGIOGRAM W AND/OR WO IV CONTRAST 2023 [id number] CT    HYSTERECTOMY      d/t heavy menses    MASTECTOMY Right     OTHER SURGICAL HISTORY  2021    Breast reconstruction    OTHER SURGICAL HISTORY      Melanoma Excision     Family History   Problem Relation Name Age of Onset    Diabetes Mother      Melanoma Father      Thyroid cancer Father      Melanoma Brother      Leukemia  "Brother      Other (brain tumor) Brother       Social History     Tobacco Use    Smoking status: Never    Smokeless tobacco: Never   Vaping Use    Vaping Use: Never used   Substance Use Topics    Alcohol use: Never    Drug use: Never           REVIEW OF SYSTEMS  Abdomen: No abdominal pain, nausea, vomiting, diarrhea, or constipation.   No bloating, early satiety, indigestion, or increased flatulence.  Bladder: No dysuria, gross hematuria, urinary frequency, urinary urgency, or incontinence.  Breast: No breast lumps, nipple d/c, overlying skin changes, redness or skin retraction.  Allergies and current medication updated:Yes        EXAM:  /81   Ht 1.651 m (5' 5\")   Wt 89.9 kg (198 lb 3.2 oz)   LMP  (LMP Unknown) Comment: urine pregnancy negative  BMI 32.98 kg/m²   GENERAL: pleasant, female in no apparent distress  HEENT: Normocephalic, atraumatic, mucus membranes moist and no lesions  NECK: Supple, full range of motion, no adenopathy and thyroid normal  DERMATOLOGY: Normal, without lesions, non-icteric and non-hirsute  BREAST: Recent breast augmentation surgery   soft, non-tender, symmetric, no dominant mass, normal nipple-areolar complex, no lymphadenopathy and no nipple discharge  CHEST: Normal inspiratory effort  ABDOMEN: soft, non-tender and no masses  Clean dry incission   PELVIC: external genitalia normal, normal Bartholin's glands, urethra, Udall's glands, no vulvar lesions, no cervical lesions, good vaginal support, physiologic discharge present, normal appearing perineal body and perianal region  BIMANUAL: no adnexal masses and non-tender  RECTOVAGINAL: rectovaginal exam negative for any masses or nodularity.  NEURO: alert and oriented x3,exam grossly non-focal  EXTREMITIES: normal        ASSESSMENT:  Healthy female     PLAN:  Education reviewed and Recommended: Safe Sex/STD Prevention, Smoking Cessation, Self Breast Exams, Calcium Supplements, Weight Bearing Exercise, Low Fat, and Low  Cholesterol " Diet, Skin Cancer Screening, Depression Evaluation,     Hormone Replacement Therapy: Risks and Benefits Reviewed.  Mammogram ordered.        Counseled the patient on the appropriate screening modalities for life threatening condition such as breast disease-mammogram starting at 40 years of age, lipid profile, Pap smear.  Discussion of the reproductive plan/contraception.  Physical activity discussed.  Counseled the patient on health/risk behavior/injury prevention/safety belts and helmets/ occupational and recreational hazards.  Counseled on cardiovascular risk factors (Family hx, htn, dyslipidemia, obesity,diabetes, life style modifications.  Routine Health Maintenance through patient PCP  Follow up one year and as needed.  .     Jessica Oneill MD           This note was produced with voice recognition software and may contain errors in grammar, spelling and content.  If any questions, please feel free to contact me.     I was accompanied by Female Chaperone, LPN  during the exam

## 2024-01-21 DIAGNOSIS — I10 HYPERTENSION, UNSPECIFIED TYPE: ICD-10-CM

## 2024-01-21 ASSESSMENT — ENCOUNTER SYMPTOMS
SHORTNESS OF BREATH: 0
ARTHRALGIAS: 0
EYE PAIN: 0
FEVER: 0
ABDOMINAL PAIN: 0
CHILLS: 0
RHINORRHEA: 0
FATIGUE: 0
FLANK PAIN: 0
ABDOMINAL DISTENTION: 0
JOINT SWELLING: 0
DIARRHEA: 0
NAUSEA: 0
PHOTOPHOBIA: 0
MYALGIAS: 0
HEADACHES: 0
SORE THROAT: 0
DIAPHORESIS: 0
SLEEP DISTURBANCE: 0
CONFUSION: 0
LIGHT-HEADEDNESS: 0
NUMBNESS: 0
COUGH: 0
PALPITATIONS: 0
DIFFICULTY URINATING: 0
CONSTIPATION: 0
DIZZINESS: 0
VOMITING: 0
CHEST TIGHTNESS: 0
FACIAL SWELLING: 0
DYSURIA: 0
BRUISES/BLEEDS EASILY: 0

## 2024-01-21 NOTE — PROGRESS NOTES
Department of Plastic and Reconstructive Surgery  Clinic Visit Note    Patient Name: Lorraine Liu  MRN: [id number]  Date:  01/21/24     HPI    Lorraine Liu is a 53 y.o. female with a past medical history of meningioma and R breast cancer s/p mastectomy with immediate reconstruction via tissue expander placement on 3/19/2021. She expressed interest in autologous second stage breast reconstruction and is now s/p R breast reconstruction via EMI free flap on 12/12/23 with Dr. Watkins of Plastic Surgery. Her DEBBIE drain was removed in clinic on 12/28/23 without difficulty. She has been having ongoing wound concerns (including jorge-incisional cellulitis and poor wound progression) at sites along her abdominal incision for which she has been treating with daily local wound care/Aquacel AG dressing changes and PO ABX. Patient has underwent ~2 week course of PO Augmentin and most recently was prescribed an additional one week course of PO Bactrim during her last clinic visit on 1/15/24. She presents to clinic today for repeat wound evaluation.     Subjective    Patient states she is overall doing well. Has been doing wound care to her abdomen x2 a day. Tolerated full course of PO Bactrim. Notes sleeping at nighttime has improved slightly, but continues to have issues getting to sleep on occasion. Consistently wearing abdominal binder at home. Notes bowel movement every other day. Denies any fever, chills, night sweats, CP, SOB, palpitations, nausea, vomiting.     Past Medical History:   Diagnosis Date    Chest pain     Saw Dr. Mclaughlin in 05/2022    Ductal carcinoma of breast (CMS/HCC) 01/2021    Hypertension     F/W PCP    Melanoma (CMS/HCC)     Meningioma (CMS/HCC)     Shortness of breath     Ct Calcium Scoring 3/18/2022: IMPRESSION: Coronary artery calcium score of  0.    Stress incontinence      Past Surgical History:   Procedure Laterality Date    BI BREAST CYST ASPIRATION LEFT Left 07/19/2016    BI BREAST CYST  ASPIRATION LEFT [facility]    BI MAMMO GUIDED LOCALIZATION BREAST RIGHT Right 02/10/2021    BI MAMMO GUIDED LOCALIZATION BREAST RIGHT 2/10/2021 [facility] CLINICAL LEGACY    BI STEREOTACTIC GUIDED BREAST RIGHT LOCALIZATION AND BIOPSY Right 2021    BI STEREOTACTIC GUIDED BREAST LOCALIZATION AND BIOPSY RIGHT [facility]    BI US GUIDED BREAST LOCALIZATION AND BIOPSY RIGHT Right 2021    BI US GUIDED BREAST LOCALIZATION AND BIOPSY RIGHT [facility]    BREAST LUMPECTOMY Right 02/10/2021     SECTION, LOW TRANSVERSE      x2    CT ABDOMEN PELVIS ANGIOGRAM W AND/OR WO IV CONTRAST  2023    CT ABDOMEN PELVIS ANGIOGRAM W AND/OR WO IV CONTRAST 2023 [id number] CT    HYSTERECTOMY      d/t heavy menses    MASTECTOMY Right     OTHER SURGICAL HISTORY  2021    Breast reconstruction    OTHER SURGICAL HISTORY      Melanoma Excision     No Known Allergies    Current Outpatient Medications:     bacitracin 500 unit/gram ointment, Apply to affected area daily, Disp: 28 g, Rfl: 0    gabapentin (Neurontin) 300 mg capsule, Take 2 capsules (600 mg) by mouth every 12 hours for 7 days., Disp: 28 capsule, Rfl: 0    hydrocortisone 1 % ointment, Apply topically 2 times a day for 7 days., Disp: 28 g, Rfl: 0    lisinopriL-hydrochlorothiazide 20-12.5 mg tablet, Take 2 tablets by mouth once daily, Disp: 60 tablet, Rfl: 0    methocarbamol (Robaxin) 500 mg tablet, Take 1 tablet (500 mg) by mouth every 6 hours for 3 days., Disp: 12 tablet, Rfl: 0    omeprazole OTC (PriLOSEC OTC) 20 mg EC tablet, Take 1 tablet (20 mg) by mouth once daily in the morning. Take before meals. Do not crush, chew, or split., Disp: 30 tablet, Rfl: 3    sulfamethoxazole-trimethoprim (Bactrim DS) 800-160 mg tablet, Take 1 tablet by mouth 2 times a day for 7 days., Disp: 14 tablet, Rfl: 0     Family History   Problem Relation Name Age of Onset    Diabetes Mother      Melanoma Father      Thyroid cancer Father       Melanoma Brother      Leukemia Brother      Other (brain tumor) Brother       Social History     Tobacco Use    Smoking status: Never    Smokeless tobacco: Never   Vaping Use    Vaping Use: Never used   Substance Use Topics    Alcohol use: Never    Drug use: Never     Review of Systems   Constitutional:  Negative for chills, diaphoresis, fatigue and fever.   HENT:  Negative for congestion, ear pain, facial swelling, hearing loss, nosebleeds, rhinorrhea and sore throat.    Eyes:  Negative for photophobia, pain and visual disturbance.   Respiratory:  Negative for cough, chest tightness and shortness of breath.    Cardiovascular:  Negative for chest pain, palpitations and leg swelling.   Gastrointestinal:  Negative for abdominal distention, abdominal pain, constipation, diarrhea, nausea and vomiting.   Endocrine: Negative for polyuria.   Genitourinary:  Negative for decreased urine volume, difficulty urinating, dysuria and flank pain.   Musculoskeletal:  Negative for arthralgias, joint swelling and myalgias.   Skin:  Negative for rash.   Neurological:  Negative for dizziness, light-headedness, numbness and headaches.   Hematological:  Does not bruise/bleed easily.   Psychiatric/Behavioral:  Negative for confusion and sleep disturbance.       Objective   LMP  (LMP Unknown) Comment: urine pregnancy negative     Physical Exam  Constitutional: A&Ox3, calm and cooperative, NAD.  Eyes: EOMI, clear sclera.  ENMT: Moist mucous membranes, no apparent injuries or lesions.  Head/Neck: NC/AT. Neck supple.   Cardiovascular: Normal rate and regular rhythm. 2+ equal pulses of the distal extremities.  Respiratory/Thorax: Breathing comfortably with regular respirations on RA. Good symmetric chest expansion. Right breast with well perfused EMI flap. Suture line intact and well approximated.     Gastrointestinal: Abdomen soft, non-tender. Abdominal suture line intact with a pinpoint opening to midportion of incision, much improved in  size compared to prior assessment, region of slough resolved. She has a minor rash round her incision with raised red bumps. Her umbilicus is intact. She has a contour irregularity of the left abdomen which is mildly tender to palpation.     Genitourinary: Voiding independently.   Extremities: No peripheral edema. Moves all 4 extremities actively, strength appropriate.   Neurological: A&Ox3.   Psychological: Appropriate mood and behavior.   Skin: Warm and dry, no rashes.      Diagnostics   No results found for this or any previous visit (from the past 24 hour(s)).  No results found.    Assessment   Lorraine Liu is a 53 y.o. female who is s/p R breast reconstruction via EMI free flap on 12/12/23 with Dr. Watkins of Plastic Surgery. Postoperative course notable for ongoing wound concerns (including jorge-incisional cellulitis and poor wound progression) at sites along her abdominal incision for which she has been treating with daily local wound care/Aquacel AG dressing changes and PO ABX. Patient has underwent ~2 week course of PO Augmentin and most recently was prescribed an additional one week course of PO Bactrim during her last clinic visit on 1/15/24. She presents to clinic today for repeat wound evaluation.     Plan    - Abdominal dressings removed on assessment with areas of poor wound progression along abdominal incision improved in appearance from last week  - Continue with daily local wound care/dressing changes as follows:        ·   Daily showers with antibacterial (non-fragrance) soap       ·   Incisional and jorge-incisional care: Bacitracin to abdominal incision x1 week (end date 1/29)        ·   Discontinue dressing changes        ·   After completion of bacitracin, start to apply Aquaphor lotion to incision line daily to aid in scar healing   - Continue to wear abdominal binder for alleviation of postoperative abdominal edema and discomfort   - May side sleep  - Continue lifting restrictions (max  ~7lbs) until further discussed at next follow up appointment   - PO Bactrim course now complete, no further antibiotic needs considered clinically indicated at this time, wounds with improved appearance   - Benadryl over the counter at bedtime for wound rash and sleeping aid  - Send pictures of wound as needed to MyChart (Dr. Watkins or plastics OP nurse)   - Will plan for revision of R breast reconstruction and possible L breast for asymmetry, possible liposuction and fat grafting once wound(s) have fully healed  - Follow up in plastic surgery clinic in 4-6 weeks, unless concerns arise in interim     Patient evaluated and plan discussed with Dr. Watkins.    Cata Campoverde PA-C  Plastic and Reconstructive Surgery

## 2024-01-21 NOTE — PATIENT INSTRUCTIONS
It was a pleasure seeing you in follow-up today for an abdominal incisional wound check.     Please continue daily local wound care and dressing changes to your abdominal incisional wound site as follows:       ·   Daily showers with antibacterial (non-fragrance) soap       ·   Incisional and jorge-incisional care: Bacitracin to abdominal incision daily for 1 additional week (end date 1/29)        ·   Discontinue dressing changes        ·   After completion of bacitracin, start to apply Aquaphor lotion to incision line daily     You may shower but please do not vigorously scrub the wound or submerge the site for a prolonged period of time until fully healed. Additionally, do not apply any non-prescribed lotions or creams to the area until fully healed.     Continue to monitor the wound site for any worsening progression which may include any enlargement of the wound, increased drainage or deteriorating appearance. Please also continue to monitor for any worsening signs of infection which may include increased redness surrounding the wound, thick/yellow drainage, foul odors, worsening pain, swelling, or fevers/chills.     Continue to wear abdominal binder for alleviation of postoperative abdominal edema and discomfort     You may side sleep but please continue lifting restrictions (max ~7-8lbs) until you are cleared by plastic surgery office.     Bactrim course now complete, no further antibiotic needs considered clinically indicated at this time, wounds with improved appearance.     Continue taking Benadryl over the counter at bedtime for wound rash and sleeping aid. For any further prescriptions/med refills of Omeprazole, please see your primary care doctor.     Send pictures of wound as needed to [facility] (Dr. Watkins or plastics OP nurse).   Please follow-up with the plastic surgery office for wound reevaluation in 4-6 weeks.  Please contact the office sooner with any interim questions or concerns at [phone].

## 2024-01-22 ENCOUNTER — OFFICE VISIT (OUTPATIENT)
Dept: PLASTIC SURGERY | Facility: CLINIC | Age: 54
End: 2024-01-22
Payer: COMMERCIAL

## 2024-01-22 VITALS
BODY MASS INDEX: 33.12 KG/M2 | TEMPERATURE: 97 F | HEART RATE: 85 BPM | DIASTOLIC BLOOD PRESSURE: 72 MMHG | WEIGHT: 199 LBS | OXYGEN SATURATION: 99 % | SYSTOLIC BLOOD PRESSURE: 110 MMHG

## 2024-01-22 DIAGNOSIS — Z98.890 S/P BREAST RECONSTRUCTION: Primary | ICD-10-CM

## 2024-01-22 PROCEDURE — 99024 POSTOP FOLLOW-UP VISIT: CPT

## 2024-01-22 PROCEDURE — 3074F SYST BP LT 130 MM HG: CPT | Performed by: PHYSICIAN ASSISTANT

## 2024-01-22 PROCEDURE — 3078F DIAST BP <80 MM HG: CPT | Performed by: PHYSICIAN ASSISTANT

## 2024-01-22 PROCEDURE — 1036F TOBACCO NON-USER: CPT | Performed by: PHYSICIAN ASSISTANT

## 2024-01-22 RX ORDER — LISINOPRIL AND HYDROCHLOROTHIAZIDE 12.5; 2 MG/1; MG/1
2 TABLET ORAL DAILY
Qty: 60 TABLET | Refills: 1 | Status: SHIPPED | OUTPATIENT
Start: 2024-01-22 | End: 2024-03-22

## 2024-01-22 ASSESSMENT — PAIN SCALES - GENERAL: PAINLEVEL: 0-NO PAIN

## 2024-01-29 ENCOUNTER — HOSPITAL ENCOUNTER (OUTPATIENT)
Dept: RADIOLOGY | Facility: HOSPITAL | Age: 54
Discharge: HOME | End: 2024-01-29
Payer: COMMERCIAL

## 2024-01-29 VITALS — BODY MASS INDEX: 32.82 KG/M2 | HEIGHT: 65 IN | WEIGHT: 197 LBS

## 2024-01-29 DIAGNOSIS — Z12.31 ENCOUNTER FOR SCREENING MAMMOGRAM FOR MALIGNANT NEOPLASM OF BREAST: ICD-10-CM

## 2024-01-29 DIAGNOSIS — N60.91 UNSPECIFIED BENIGN MAMMARY DYSPLASIA OF RIGHT BREAST: ICD-10-CM

## 2024-01-29 PROCEDURE — 77067 SCR MAMMO BI INCL CAD: CPT | Mod: LT

## 2024-01-31 LAB
CYTOLOGY CMNT CVX/VAG CYTO-IMP: NORMAL
LAB AP HPV GENOTYPE QUESTION: YES
LAB AP HPV HR: NORMAL
LABORATORY COMMENT REPORT: NORMAL
PATH REPORT.TOTAL CANCER: NORMAL

## 2024-02-01 RX ORDER — TAMOXIFEN CITRATE 20 MG/1
20 TABLET ORAL DAILY
Qty: 90 TABLET | Refills: 1 | Status: CANCELLED | OUTPATIENT
Start: 2024-02-01 | End: 2025-01-31

## 2024-02-01 NOTE — PROGRESS NOTES
Subjective :  Patient ID: Lorraine Liu is a 53 y.o. female.    History of Present Illness: Lorraine Liu is a 53 y.o. female with a past medical history of meningioma and R breast cancer s/p mastectomy with immediate reconstruction via tissue expander placement on 3/19/2021. She expressed interest in autologous second stage breast reconstruction and is now s/p R breast reconstruction via EMI free flap on 12/12/23 with Dr. Watkins of Plastic Surgery. Her DEBBIE drain was removed in clinic on 12/28/23 without difficulty. She has been having ongoing wound concerns (including jorge-incisional cellulitis and poor wound progression) at sites along her abdominal incision for which she has been treating with daily local wound care/Aquacel AG dressing changes and PO ABX. Patient has underwent ~2 week course of PO Augmentin and most recently was prescribed an additional one week course of PO Bactrim during her last clinic visit on 1/15/24. She presents to clinic today for repeat wound evaluation.     Today, her incisions appear c/d/intact and are healing well. R side of abdominal incision is more hyperpigmented compared to the left. Denies fever, chest pain, SOB, abd pain, n/v/d.    Objective :  Physical Exam  Constitutional: NAD  Eyes: EOMI, clear sclera   ENMT: Moist mucous membranes, no apparent injuries or lesions  Head/Neck: NCAT  Cardiovascular: Extremities WWP  Respiratory/Thorax: Unlabored respirations on RA  Gastrointestinal: Abdomen soft, non-distended, non-tender. Incision c/d/intact. R side of incision more hyperpigmented compared to left side. She has a contour irregularity of the left abdomen which is intermittently mildly tender to palpation.     Extremities: MAEx4  Neurological: A&Ox3  Psychological: Appropriate mood and behavior  Skin: Warm and dry with no lesions or rashes  Breast: Right breast with well perfused EMI flap. Suture line intact and well approximated.       Assessment/Plan :    #S/P R breast  reconstruction via EMI free flap on 12/12/23  - R breast and abdominal incision healing well. Continue daily aquaphor and initiate scar massage  - OK to bathe  - Wear abdominal binder as needed  - Plan for revision in November (R breast fat grafting at the superior medial pole, left breast reduction, liposuction of abdomen and lateral axillary folds, dog ear removal at abdomen, revision of reconstructed breast).  - Follow up in Oct to discuss surgery

## 2024-02-03 DIAGNOSIS — C50.411 MALIGNANT NEOPLASM OF UPPER-OUTER QUADRANT OF RIGHT BREAST IN FEMALE, ESTROGEN RECEPTOR NEGATIVE (MULTI): Primary | ICD-10-CM

## 2024-02-03 DIAGNOSIS — Z17.1 MALIGNANT NEOPLASM OF UPPER-OUTER QUADRANT OF RIGHT BREAST IN FEMALE, ESTROGEN RECEPTOR NEGATIVE (MULTI): Primary | ICD-10-CM

## 2024-02-05 ENCOUNTER — OFFICE VISIT (OUTPATIENT)
Dept: HEMATOLOGY/ONCOLOGY | Facility: CLINIC | Age: 54
End: 2024-02-05
Payer: COMMERCIAL

## 2024-02-05 VITALS
SYSTOLIC BLOOD PRESSURE: 154 MMHG | WEIGHT: 199.74 LBS | DIASTOLIC BLOOD PRESSURE: 99 MMHG | BODY MASS INDEX: 33.24 KG/M2 | OXYGEN SATURATION: 96 % | RESPIRATION RATE: 18 BRPM | HEART RATE: 98 BPM | TEMPERATURE: 97.5 F

## 2024-02-05 DIAGNOSIS — C50.411 MALIGNANT NEOPLASM OF UPPER-OUTER QUADRANT OF RIGHT BREAST IN FEMALE, ESTROGEN RECEPTOR NEGATIVE (MULTI): Primary | ICD-10-CM

## 2024-02-05 DIAGNOSIS — Z17.1 MALIGNANT NEOPLASM OF UPPER-OUTER QUADRANT OF RIGHT BREAST IN FEMALE, ESTROGEN RECEPTOR NEGATIVE (MULTI): Primary | ICD-10-CM

## 2024-02-05 LAB
BASOPHILS # BLD AUTO: 0.05 X10*3/UL (ref 0–0.1)
BASOPHILS NFR BLD AUTO: 0.5 %
EOSINOPHIL # BLD AUTO: 0.3 X10*3/UL (ref 0–0.7)
EOSINOPHIL NFR BLD AUTO: 2.9 %
ERYTHROCYTE [DISTWIDTH] IN BLOOD BY AUTOMATED COUNT: 12.5 % (ref 11.5–14.5)
HCT VFR BLD AUTO: 36.4 % (ref 36–46)
HGB BLD-MCNC: 11.8 G/DL (ref 12–16)
IMM GRANULOCYTES # BLD AUTO: 0.04 X10*3/UL (ref 0–0.7)
IMM GRANULOCYTES NFR BLD AUTO: 0.4 % (ref 0–0.9)
LYMPHOCYTES # BLD AUTO: 2.59 X10*3/UL (ref 1.2–4.8)
LYMPHOCYTES NFR BLD AUTO: 25.4 %
MCH RBC QN AUTO: 29.4 PG (ref 26–34)
MCHC RBC AUTO-ENTMCNC: 32.4 G/DL (ref 32–36)
MCV RBC AUTO: 91 FL (ref 80–100)
MONOCYTES # BLD AUTO: 0.53 X10*3/UL (ref 0.1–1)
MONOCYTES NFR BLD AUTO: 5.2 %
NEUTROPHILS # BLD AUTO: 6.7 X10*3/UL (ref 1.2–7.7)
NEUTROPHILS NFR BLD AUTO: 65.6 %
NRBC BLD-RTO: 0 /100 WBCS (ref 0–0)
PLATELET # BLD AUTO: 311 X10*3/UL (ref 150–450)
RBC # BLD AUTO: 4.01 X10*6/UL (ref 4–5.2)
WBC # BLD AUTO: 10.2 X10*3/UL (ref 4.4–11.3)

## 2024-02-05 PROCEDURE — 85025 COMPLETE CBC W/AUTO DIFF WBC: CPT | Performed by: NURSE PRACTITIONER

## 2024-02-05 PROCEDURE — 1036F TOBACCO NON-USER: CPT | Performed by: NURSE PRACTITIONER

## 2024-02-05 PROCEDURE — 99213 OFFICE O/P EST LOW 20 MIN: CPT | Performed by: NURSE PRACTITIONER

## 2024-02-05 PROCEDURE — 3080F DIAST BP >= 90 MM HG: CPT | Performed by: NURSE PRACTITIONER

## 2024-02-05 PROCEDURE — 3077F SYST BP >= 140 MM HG: CPT | Performed by: NURSE PRACTITIONER

## 2024-02-05 RX ORDER — TAMOXIFEN CITRATE 20 MG/1
20 TABLET ORAL DAILY
Qty: 90 TABLET | Refills: 0 | Status: SHIPPED | OUTPATIENT
Start: 2024-02-05 | End: 2024-05-10 | Stop reason: SDUPTHER

## 2024-02-05 ASSESSMENT — PATIENT HEALTH QUESTIONNAIRE - PHQ9: 2. FEELING DOWN, DEPRESSED OR HOPELESS: NOT AT ALL

## 2024-02-05 ASSESSMENT — ENCOUNTER SYMPTOMS
OCCASIONAL FEELINGS OF UNSTEADINESS: 0
LOSS OF SENSATION IN FEET: 0

## 2024-02-05 ASSESSMENT — COLUMBIA-SUICIDE SEVERITY RATING SCALE - C-SSRS
6. HAVE YOU EVER DONE ANYTHING, STARTED TO DO ANYTHING, OR PREPARED TO DO ANYTHING TO END YOUR LIFE?: NO
1. IN THE PAST MONTH, HAVE YOU WISHED YOU WERE DEAD OR WISHED YOU COULD GO TO SLEEP AND NOT WAKE UP?: NO
2. HAVE YOU ACTUALLY HAD ANY THOUGHTS OF KILLING YOURSELF?: NO

## 2024-02-05 ASSESSMENT — PAIN SCALES - GENERAL: PAINLEVEL: 0-NO PAIN

## 2024-02-05 NOTE — PROGRESS NOTES
Patient ID: Lorraine Liu is a 53 y.o. female.    Cancer History:  1. Melanoma on the left calf 0.7 mm superficial spreading melanoma diagnosed in 1997 in the setting of a mole that had changed. Stage T1N0M0 with negative sentinel lymph node and no ulceration or lesion.  2. Risk factors are genetic predisposition of multiple nevi. Brother with an oligodendroglioma. No other malignancy in the family.  3. Status post hysterectomy transvaginally without oophorectomy on 07/20/2020 for heavy menses.  4. 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 ER/AK positive. HER-2/bienvenido pending.  5. 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 ER/AK negative and HER-2/bienvenido positive.  6. Status post lumpectomy on 02/10/2021 for the diagnosis of ER/AK negative, HER-2/bienvenido positive, microinvasive DCIS and multi-focal LCIS as well.  7. Status post right simple mastectomy on 03/19/2021, which confirmed microinvasive DCIS and multi-focal LCIS with no microinvasion greater than 0.1 cm.  8. Started tamoxifen on 05/05/2021.    Interval History:  Patient presents for routine follow up evaluation for history of breast cancer. She is doing well on Tamoxifen. Since our last visit she underwent EMI free flap on December 12, 2023 with Dr Watkins. Unfortunately she has had complicated healing of abdominal wound. And has been on several rounds of antibiotics. Still with occasional small amount of drainage with activity. On assessment looking as though is healing well at this time, well approximated wound.   Today she denies any fever, chills or night sweats.  No cough, chest pain or shortness of breath.  No nausea or vomiting.  No constipation or diarrhea.  No breast complaints.  She does have history of malignant melanoma is followed by dermatology (due for appt). She also has a meningioma for which she follows with neurology (appointment scheduled - overdue for  imaging).    Review of Systems:  A review of systems has been completed and are negative for complaints except what is stated in the HPI and/or past medical history    Allergies:  NKDA    Medications:  Medication list reviewed with patient and updated in EMR    Social History:  Never smoker    Vital Signs:  BP (!) 154/99 (BP Location: Left arm, Patient Position: Sitting, BP Cuff Size: Adult long)   Pulse 98   Temp 36.4 °C (97.5 °F) (Temporal)   Resp 18   Wt 90.6 kg (199 lb 11.8 oz)   LMP  (LMP Unknown) Comment: urine pregnancy negative  SpO2 96%   BMI 33.24 kg/m²     Physical Exam:  ECOG:  Pain:  Constitutional: Well developed, awake/alert/oriented x3, no distress, alert and cooperative  Eyes: PER. sclera anicteric  ENMT: Oral mucosa moist  Respiratory/Thorax: Breathing is non-labored. Lungs are clear to auscultation bilaterally. No adventitious breath sounds  Cardiovascular: S1-S2. Regular rate and rhythm. No murmurs, rubs, or gallops appreciated  Gastrointestinal: Abdomen soft nontender, nondistended, normal active bowel sounds. Well approximated wound, no drainage  Musculoskeletal: ROM intact, no joint swelling, normal strength  Extremities: normal extremities, no cyanosis, no edema, no clubbing  Breast: Right: well healed mastectomy incision with no abnormal nodules.  Left: benign, no abnormal nodules  Lymphatics: no palpable lymphadenopathy   Neurologic: alert and oriented x3. Nonfocal exam. No myoclonus  Psychological: Pleasant, appropriate and easily engaged     Radiology Results:    Mammogram 01/29/2024   FINDINGS:  2D and tomosynthesis images were reviewed at 1 mm slice thickness.      Density:  The breast tissue is extremely dense, which may limit the  sensitivity of mammography.      No suspicious masses or calcifications are identified.  Scattered  benign appearing calcifications are present.      IMPRESSION:  No mammographic evidence of malignancy.      BI-RADS CATEGORY:      BI-RADS Category:  2  Benign.  Recommendation:  Routine Screening Mammogram in 1 Year.  Recommended Date:  1 Year.    Assessment:  Breast Cancer:  - Continue Tamoxifen, tolerating well  - - goal 5 years, May 2026  - last GYN 1/16/2024  Malignant Melanoma  - due for follow up with dermatology encouraged appointment    Meningioma   - neurology (appointment scheduled - overdue for imaging).      Plan:  - 6 month follow-up  - refill on tamoxifen placed       DEEPAK Arteaga-CNP

## 2024-02-08 ENCOUNTER — OFFICE VISIT (OUTPATIENT)
Dept: PRIMARY CARE | Facility: CLINIC | Age: 54
End: 2024-02-08
Payer: COMMERCIAL

## 2024-02-08 VITALS
TEMPERATURE: 98.6 F | WEIGHT: 199 LBS | OXYGEN SATURATION: 98 % | RESPIRATION RATE: 18 BRPM | SYSTOLIC BLOOD PRESSURE: 130 MMHG | DIASTOLIC BLOOD PRESSURE: 74 MMHG | BODY MASS INDEX: 33.15 KG/M2 | HEART RATE: 78 BPM | HEIGHT: 65 IN

## 2024-02-08 DIAGNOSIS — Z12.11 ENCOUNTER FOR SCREENING FOR MALIGNANT NEOPLASM OF COLON: ICD-10-CM

## 2024-02-08 DIAGNOSIS — I10 PRIMARY HYPERTENSION: ICD-10-CM

## 2024-02-08 DIAGNOSIS — E78.5 HYPERLIPIDEMIA, UNSPECIFIED HYPERLIPIDEMIA TYPE: Primary | ICD-10-CM

## 2024-02-08 DIAGNOSIS — Z12.11 SCREENING FOR COLON CANCER: ICD-10-CM

## 2024-02-08 PROCEDURE — 3078F DIAST BP <80 MM HG: CPT | Performed by: FAMILY MEDICINE

## 2024-02-08 PROCEDURE — 1036F TOBACCO NON-USER: CPT | Performed by: FAMILY MEDICINE

## 2024-02-08 PROCEDURE — 99213 OFFICE O/P EST LOW 20 MIN: CPT | Performed by: FAMILY MEDICINE

## 2024-02-08 PROCEDURE — 3075F SYST BP GE 130 - 139MM HG: CPT | Performed by: FAMILY MEDICINE

## 2024-02-08 ASSESSMENT — PATIENT HEALTH QUESTIONNAIRE - PHQ9
2. FEELING DOWN, DEPRESSED OR HOPELESS: NOT AT ALL
SUM OF ALL RESPONSES TO PHQ9 QUESTIONS 1 AND 2: 0
1. LITTLE INTEREST OR PLEASURE IN DOING THINGS: NOT AT ALL

## 2024-02-08 ASSESSMENT — PAIN SCALES - GENERAL: PAINLEVEL: 0-NO PAIN

## 2024-02-08 NOTE — PROGRESS NOTES
"Subjective   Patient ID: Lorraine Liu is a 53 y.o. female who presents for medication follow up (Pt here for medication follow up. Pt states she called EMS last night for chest pain).    HPI     Review of Systems    Objective   /74 (BP Location: Left arm, Patient Position: Sitting, BP Cuff Size: Large adult)   Pulse 78   Temp 37 °C (98.6 °F) (Temporal)   Resp 18   Ht 1.651 m (5' 5\")   Wt 90.3 kg (199 lb)   LMP  (LMP Unknown) Comment: urine pregnancy negative  SpO2 98%   BMI 33.12 kg/m²     Physical Exam  Constitutional:       General: She is not in acute distress.     Appearance: Normal appearance.   Cardiovascular:      Rate and Rhythm: Normal rate and regular rhythm.      Heart sounds: No murmur heard.  Pulmonary:      Breath sounds: Normal breath sounds. No wheezing.   Neurological:      Mental Status: She is alert.         Assessment/Plan   Problem List Items Addressed This Visit             ICD-10-CM    Hypertension I10    Relevant Orders    Comprehensive Metabolic Panel    TSH with reflex to Free T4 if abnormal    Hyperlipidemia - Primary E78.5    Relevant Orders    Lipid Panel    Encounter for screening for malignant neoplasm of colon Z12.11     Other Visit Diagnoses         Codes    Screening for colon cancer     Z12.11    Relevant Orders    Referral to Gastroenterology               "

## 2024-02-09 ENCOUNTER — CLINICAL SUPPORT (OUTPATIENT)
Dept: PRIMARY CARE | Facility: CLINIC | Age: 54
End: 2024-02-09
Payer: COMMERCIAL

## 2024-02-09 DIAGNOSIS — I10 PRIMARY HYPERTENSION: ICD-10-CM

## 2024-02-09 DIAGNOSIS — E78.5 HYPERLIPIDEMIA, UNSPECIFIED HYPERLIPIDEMIA TYPE: ICD-10-CM

## 2024-02-09 LAB
ALBUMIN SERPL-MCNC: 4.1 G/DL (ref 3.5–5)
ALP BLD-CCNC: 73 U/L (ref 35–125)
ALT SERPL-CCNC: 51 U/L (ref 5–40)
ANION GAP SERPL CALC-SCNC: 14 MMOL/L
AST SERPL-CCNC: 61 U/L (ref 5–40)
BILIRUB SERPL-MCNC: 0.4 MG/DL (ref 0.1–1.2)
BUN SERPL-MCNC: 18 MG/DL (ref 8–25)
CALCIUM SERPL-MCNC: 9.2 MG/DL (ref 8.5–10.4)
CHLORIDE SERPL-SCNC: 100 MMOL/L (ref 97–107)
CHOLEST SERPL-MCNC: 222 MG/DL (ref 133–200)
CHOLEST/HDLC SERPL: 5 {RATIO}
CO2 SERPL-SCNC: 22 MMOL/L (ref 24–31)
CREAT SERPL-MCNC: 1 MG/DL (ref 0.4–1.6)
EGFRCR SERPLBLD CKD-EPI 2021: 67 ML/MIN/1.73M*2
GLUCOSE SERPL-MCNC: 132 MG/DL (ref 65–99)
HDLC SERPL-MCNC: 44 MG/DL
LDLC SERPL CALC-MCNC: 151 MG/DL (ref 65–130)
POTASSIUM SERPL-SCNC: 4.3 MMOL/L (ref 3.4–5.1)
PROT SERPL-MCNC: 7.6 G/DL (ref 5.9–7.9)
SODIUM SERPL-SCNC: 136 MMOL/L (ref 133–145)
TRIGL SERPL-MCNC: 134 MG/DL (ref 40–150)
TSH SERPL DL<=0.05 MIU/L-ACNC: 2.01 MIU/L (ref 0.27–4.2)

## 2024-02-09 PROCEDURE — 36415 COLL VENOUS BLD VENIPUNCTURE: CPT

## 2024-02-09 PROCEDURE — 84443 ASSAY THYROID STIM HORMONE: CPT

## 2024-02-09 PROCEDURE — 80053 COMPREHEN METABOLIC PANEL: CPT

## 2024-02-09 PROCEDURE — 80061 LIPID PANEL: CPT

## 2024-02-10 ENCOUNTER — SPECIALTY PHARMACY (OUTPATIENT)
Dept: PHARMACY | Facility: CLINIC | Age: 54
End: 2024-02-10

## 2024-02-10 PROCEDURE — RXMED WILLOW AMBULATORY MEDICATION CHARGE

## 2024-02-13 ENCOUNTER — PHARMACY VISIT (OUTPATIENT)
Dept: PHARMACY | Facility: CLINIC | Age: 54
End: 2024-02-13
Payer: COMMERCIAL

## 2024-02-15 ENCOUNTER — TELEPHONE (OUTPATIENT)
Dept: PRIMARY CARE | Facility: CLINIC | Age: 54
End: 2024-02-15
Payer: COMMERCIAL

## 2024-02-15 DIAGNOSIS — R73.9 HYPERGLYCEMIA: ICD-10-CM

## 2024-02-16 ENCOUNTER — CLINICAL SUPPORT (OUTPATIENT)
Dept: PRIMARY CARE | Facility: CLINIC | Age: 54
End: 2024-02-16
Payer: COMMERCIAL

## 2024-02-16 DIAGNOSIS — R73.9 HYPERGLYCEMIA: Primary | ICD-10-CM

## 2024-02-16 LAB — POC HEMOGLOBIN A1C: 6.5 % (ref 4.2–6.5)

## 2024-02-16 PROCEDURE — 83036 HEMOGLOBIN GLYCOSYLATED A1C: CPT

## 2024-02-16 PROCEDURE — 83036 HEMOGLOBIN GLYCOSYLATED A1C: CPT | Mod: 91,QW | Performed by: FAMILY MEDICINE

## 2024-02-20 ENCOUNTER — OFFICE VISIT (OUTPATIENT)
Dept: NEUROSURGERY | Facility: CLINIC | Age: 54
End: 2024-02-20
Payer: COMMERCIAL

## 2024-02-20 VITALS
HEIGHT: 65 IN | HEART RATE: 76 BPM | SYSTOLIC BLOOD PRESSURE: 128 MMHG | RESPIRATION RATE: 20 BRPM | BODY MASS INDEX: 33.15 KG/M2 | WEIGHT: 199 LBS | DIASTOLIC BLOOD PRESSURE: 80 MMHG

## 2024-02-20 DIAGNOSIS — D32.9 MENINGIOMA (MULTI): Primary | ICD-10-CM

## 2024-02-20 PROCEDURE — 3074F SYST BP LT 130 MM HG: CPT | Performed by: NEUROLOGICAL SURGERY

## 2024-02-20 PROCEDURE — 1036F TOBACCO NON-USER: CPT | Performed by: NEUROLOGICAL SURGERY

## 2024-02-20 PROCEDURE — 3079F DIAST BP 80-89 MM HG: CPT | Performed by: NEUROLOGICAL SURGERY

## 2024-02-20 PROCEDURE — 99212 OFFICE O/P EST SF 10 MIN: CPT | Performed by: NEUROLOGICAL SURGERY

## 2024-02-20 ASSESSMENT — PAIN SCALES - GENERAL: PAINLEVEL: 0-NO PAIN

## 2024-02-20 NOTE — PROGRESS NOTES
History of left convexity dural mass, malignant melanoma and breast cancer. Last seen 7/8/2020. Today is here for follow up. Denies headaches. Has not had MRI.    53-year-old woman with known left frontal convexity dural based mass returns for routine follow-up.  She denies any headaches, visual changes, or focal sensorimotor changes.    On exam, the patient is alert and interactive.  Extraocular movements are intact.  Face moves symmetrically.  She moves all extremities symmetrically.    The patient has not had any imaging in several years.  I like to obtain a new MRI of the brain with and without contrast to see if there has been any growth of the mass before determining whether or not any surgical intervention is warranted.

## 2024-02-26 ENCOUNTER — OFFICE VISIT (OUTPATIENT)
Dept: PLASTIC SURGERY | Facility: CLINIC | Age: 54
End: 2024-02-26
Payer: COMMERCIAL

## 2024-02-26 VITALS
WEIGHT: 199.6 LBS | HEART RATE: 88 BPM | DIASTOLIC BLOOD PRESSURE: 79 MMHG | BODY MASS INDEX: 33.26 KG/M2 | HEIGHT: 65 IN | SYSTOLIC BLOOD PRESSURE: 122 MMHG

## 2024-02-26 DIAGNOSIS — N65.1 DEFORMITY AND DISPROPORTION OF RECONSTRUCTED BREAST: Primary | ICD-10-CM

## 2024-02-26 DIAGNOSIS — N65.0 DEFORMITY AND DISPROPORTION OF RECONSTRUCTED BREAST: Primary | ICD-10-CM

## 2024-02-26 DIAGNOSIS — L98.7 EXCESSIVE SKIN AND SUBCUTANEOUS TISSUE: ICD-10-CM

## 2024-02-26 PROCEDURE — 1036F TOBACCO NON-USER: CPT

## 2024-02-26 PROCEDURE — 99024 POSTOP FOLLOW-UP VISIT: CPT

## 2024-02-26 PROCEDURE — 3078F DIAST BP <80 MM HG: CPT

## 2024-02-26 PROCEDURE — 3074F SYST BP LT 130 MM HG: CPT

## 2024-03-18 ENCOUNTER — APPOINTMENT (OUTPATIENT)
Dept: NEUROSURGERY | Facility: CLINIC | Age: 54
End: 2024-03-18
Payer: COMMERCIAL

## 2024-03-22 DIAGNOSIS — I10 HYPERTENSION, UNSPECIFIED TYPE: ICD-10-CM

## 2024-03-22 RX ORDER — LISINOPRIL AND HYDROCHLOROTHIAZIDE 12.5; 2 MG/1; MG/1
2 TABLET ORAL DAILY
Qty: 180 TABLET | Refills: 3 | Status: SHIPPED | OUTPATIENT
Start: 2024-03-22

## 2024-05-10 DIAGNOSIS — C50.411 MALIGNANT NEOPLASM OF UPPER-OUTER QUADRANT OF RIGHT BREAST IN FEMALE, ESTROGEN RECEPTOR NEGATIVE (MULTI): ICD-10-CM

## 2024-05-10 DIAGNOSIS — Z17.1 MALIGNANT NEOPLASM OF UPPER-OUTER QUADRANT OF RIGHT BREAST IN FEMALE, ESTROGEN RECEPTOR NEGATIVE (MULTI): ICD-10-CM

## 2024-05-14 PROCEDURE — RXMED WILLOW AMBULATORY MEDICATION CHARGE

## 2024-05-14 RX ORDER — TAMOXIFEN CITRATE 20 MG/1
20 TABLET ORAL DAILY
Qty: 90 TABLET | Refills: 0 | Status: SHIPPED | OUTPATIENT
Start: 2024-05-14 | End: 2024-08-20

## 2024-05-20 ENCOUNTER — SPECIALTY PHARMACY (OUTPATIENT)
Dept: PHARMACY | Facility: CLINIC | Age: 54
End: 2024-05-20

## 2024-05-22 ENCOUNTER — PHARMACY VISIT (OUTPATIENT)
Dept: PHARMACY | Facility: CLINIC | Age: 54
End: 2024-05-22
Payer: COMMERCIAL

## 2024-06-03 ENCOUNTER — HOSPITAL ENCOUNTER (OUTPATIENT)
Dept: RADIOLOGY | Facility: HOSPITAL | Age: 54
Discharge: HOME | End: 2024-06-03
Payer: COMMERCIAL

## 2024-06-03 DIAGNOSIS — D32.9 MENINGIOMA (MULTI): ICD-10-CM

## 2024-06-03 PROCEDURE — 70553 MRI BRAIN STEM W/O & W/DYE: CPT | Performed by: RADIOLOGY

## 2024-06-03 PROCEDURE — A9575 INJ GADOTERATE MEGLUMI 0.1ML: HCPCS | Performed by: NEUROLOGICAL SURGERY

## 2024-06-03 PROCEDURE — 70553 MRI BRAIN STEM W/O & W/DYE: CPT

## 2024-06-03 PROCEDURE — 2550000001 HC RX 255 CONTRASTS: Performed by: NEUROLOGICAL SURGERY

## 2024-06-03 RX ORDER — GADOTERATE MEGLUMINE 376.9 MG/ML
18 INJECTION INTRAVENOUS
Status: COMPLETED | OUTPATIENT
Start: 2024-06-03 | End: 2024-06-03

## 2024-06-03 RX ADMIN — GADOTERATE MEGLUMINE 18 ML: 376.9 INJECTION INTRAVENOUS at 16:18

## 2024-07-15 DIAGNOSIS — N65.0 DEFORMITY AND DISPROPORTION OF RECONSTRUCTED BREAST: Primary | ICD-10-CM

## 2024-07-15 DIAGNOSIS — N65.1 DEFORMITY AND DISPROPORTION OF RECONSTRUCTED BREAST: Primary | ICD-10-CM

## 2024-07-15 DIAGNOSIS — L98.7 EXCESSIVE SKIN AND SUBCUTANEOUS TISSUE: ICD-10-CM

## 2024-08-01 ENCOUNTER — APPOINTMENT (OUTPATIENT)
Dept: HEMATOLOGY/ONCOLOGY | Facility: CLINIC | Age: 54
End: 2024-08-01
Payer: COMMERCIAL

## 2024-08-05 ENCOUNTER — OFFICE VISIT (OUTPATIENT)
Dept: HEMATOLOGY/ONCOLOGY | Facility: CLINIC | Age: 54
End: 2024-08-05
Payer: COMMERCIAL

## 2024-08-05 VITALS
TEMPERATURE: 96.8 F | RESPIRATION RATE: 16 BRPM | OXYGEN SATURATION: 97 % | HEART RATE: 85 BPM | WEIGHT: 195.66 LBS | DIASTOLIC BLOOD PRESSURE: 76 MMHG | HEIGHT: 65 IN | BODY MASS INDEX: 32.6 KG/M2 | SYSTOLIC BLOOD PRESSURE: 115 MMHG

## 2024-08-05 DIAGNOSIS — C50.411 MALIGNANT NEOPLASM OF UPPER-OUTER QUADRANT OF RIGHT BREAST IN FEMALE, ESTROGEN RECEPTOR NEGATIVE (MULTI): ICD-10-CM

## 2024-08-05 DIAGNOSIS — Z79.810 LONG-TERM CURRENT USE OF TAMOXIFEN: Primary | ICD-10-CM

## 2024-08-05 DIAGNOSIS — Z17.1 MALIGNANT NEOPLASM OF UPPER-OUTER QUADRANT OF RIGHT BREAST IN FEMALE, ESTROGEN RECEPTOR NEGATIVE (MULTI): ICD-10-CM

## 2024-08-05 LAB
ESTRADIOL SERPL-MCNC: 22 PG/ML
FSH SERPL-ACNC: 51.2 IU/L

## 2024-08-05 PROCEDURE — 99213 OFFICE O/P EST LOW 20 MIN: CPT | Performed by: NURSE PRACTITIONER

## 2024-08-05 PROCEDURE — 83001 ASSAY OF GONADOTROPIN (FSH): CPT | Performed by: NURSE PRACTITIONER

## 2024-08-05 PROCEDURE — 82670 ASSAY OF TOTAL ESTRADIOL: CPT | Performed by: NURSE PRACTITIONER

## 2024-08-05 PROCEDURE — 3078F DIAST BP <80 MM HG: CPT | Performed by: NURSE PRACTITIONER

## 2024-08-05 PROCEDURE — 3008F BODY MASS INDEX DOCD: CPT | Performed by: NURSE PRACTITIONER

## 2024-08-05 PROCEDURE — 3074F SYST BP LT 130 MM HG: CPT | Performed by: NURSE PRACTITIONER

## 2024-08-05 RX ORDER — TAMOXIFEN CITRATE 20 MG/1
20 TABLET ORAL DAILY
Qty: 90 TABLET | Refills: 0 | Status: SHIPPED | OUTPATIENT
Start: 2024-08-05 | End: 2024-11-03

## 2024-08-05 ASSESSMENT — PAIN SCALES - GENERAL: PAINLEVEL: 0-NO PAIN

## 2024-08-05 ASSESSMENT — COLUMBIA-SUICIDE SEVERITY RATING SCALE - C-SSRS
2. HAVE YOU ACTUALLY HAD ANY THOUGHTS OF KILLING YOURSELF?: NO
1. IN THE PAST MONTH, HAVE YOU WISHED YOU WERE DEAD OR WISHED YOU COULD GO TO SLEEP AND NOT WAKE UP?: NO
6. HAVE YOU EVER DONE ANYTHING, STARTED TO DO ANYTHING, OR PREPARED TO DO ANYTHING TO END YOUR LIFE?: NO

## 2024-08-05 ASSESSMENT — PATIENT HEALTH QUESTIONNAIRE - PHQ9
2. FEELING DOWN, DEPRESSED OR HOPELESS: NOT AT ALL
1. LITTLE INTEREST OR PLEASURE IN DOING THINGS: NOT AT ALL
SUM OF ALL RESPONSES TO PHQ9 QUESTIONS 1 AND 2: 0

## 2024-08-05 NOTE — PROGRESS NOTES
Patient ID: Lorraine Liu is a 53 y.o. female.    Cancer History:  1. Melanoma on the left calf 0.7 mm superficial spreading melanoma diagnosed in 1997 in the setting of a mole that had changed. Stage T1N0M0 with negative sentinel lymph node and no ulceration or lesion.  2. Risk factors are genetic predisposition of multiple nevi. Brother with an oligodendroglioma. No other malignancy in the family.  3. Status post hysterectomy transvaginally without oophorectomy on 07/20/2020 for heavy menses.  4. 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 ER/WA positive. HER-2/bienvenido pending.  5. 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 ER/WA negative and HER-2/bienvenido positive.  6. Status post lumpectomy on 02/10/2021 for the diagnosis of ER/WA negative, HER-2/bienvenido positive, microinvasive DCIS and multi-focal LCIS as well.  7. Status post right simple mastectomy on 03/19/2021, which confirmed microinvasive DCIS and multi-focal LCIS with no microinvasion greater than 0.1 cm.  8. Started tamoxifen on 05/05/2021.    Interval History:  Patient presents for routine follow up evaluation for history of breast cancer. She is doing well on Tamoxifen. She underwent EMI free flap on December 12, 2023 with Dr Watkins. She has had complicated healing of abdominal wound. Requiring several rounds of antibiotics. Plans for additional reconstruction in November 2024.  Today she denies any fever, chills or night sweats.  No cough, chest pain or shortness of breath.  No nausea or vomiting.  No constipation or diarrhea.  Her primary complaint today is pain in her left breast at the 6 o'clock position. She reports this wakes her from sleep at night. She has had multiple biopsies on left breast (starting 2014). Cyst drained in 2016 left breast.     Review of Systems:  A review of systems has been completed and are negative for complaints except what is stated in the HPI  "and/or past medical history    Allergies:  NKDA    Medications:  Medication list reviewed with patient and updated in EMR    Social History:  Never smoker    Vital Signs:  /76 (BP Location: Left arm, Patient Position: Sitting, BP Cuff Size: Adult long)   Pulse 85   Temp 36 °C (96.8 °F) (Temporal)   Resp 16   Ht (S) 1.649 m (5' 4.92\")   Wt 88.7 kg (195 lb 10.5 oz)   LMP  (LMP Unknown) Comment: urine pregnancy negative  SpO2 97%   BMI 32.64 kg/m²     Physical Exam:  ECO  Pain: 0  Constitutional: Well developed, awake/alert/oriented x3, no distress, alert and cooperative  Eyes: PER. sclera anicteric  ENMT: Oral mucosa moist  Respiratory/Thorax: Breathing is non-labored.   Gastrointestinal: Abdomen soft nontender, nondistended  Musculoskeletal: ROM intact, no joint swelling, normal strength  Extremities: normal extremities, no cyanosis, no edema, no clubbing  Breast: Right: well healed mastectomy incision with no abnormal nodules.  Left: benign, no abnormal nodules  Lymphatics: no palpable lymphadenopathy   Neurologic: alert and oriented x3. Nonfocal exam. No myoclonus  Psychological: Pleasant, appropriate and easily engaged     Radiology Results:    Mammogram 2024   Interpreted By:  Aaron Wilkins,   STUDY:  BI MAMMO LEFT SCREENING TOMOSYNTHESIS;  2024 10:29 am      ACCESSION NUMBER(S):  [id number]      ORDERING CLINICIAN:  NAHOMI GILL      INDICATION:  Screening. Right mastectomy . Left cyst aspiration .      COMPARISON:  , , ,       FINDINGS:  2D and tomosynthesis images were reviewed at 1 mm slice thickness.      Density:    The breast tissue is extremely dense, which may limit the sensitivity of mammography.      No suspicious masses or calcifications are identified.  Scattered benign appearing calcifications are present.      IMPRESSION:  No mammographic evidence of malignancy.      BI-RADS CATEGORY:      BI-RADS Category:  2 Benign.  Recommendation:  " Routine Screening Mammogram in 1 Year.  Recommended Date:  1 Year.  Laterality:  Bilateral.      For any future breast imaging appointments, please call [phone]). The patient has been entered into a computer reminder system with a target due date for the next exam.        MACRO:  None      Signed by: Aaron Wilkins 1/29/2024 1:01 PM  Dictation workstation:   [id number]      Assessment:  Breast Cancer:  - Continue Tamoxifen, tolerating well  - - - goal 5 years, May 2026  - last GYN 1/16/2024  - labs today for FSH and Estradiol, patient questioning menopausal state. Could change to AI in future if pt becomes post menopausal     Malignant Melanoma:  - follows with dermatology    Meningioma:   - follows with neurology, seen February 2024      Plan:  - 6 month follow-up  - refill on tamoxifen placed   - Diagnostic left mammogram ultrasound if needed order left breast due to pain at the 6 o'clock position       DEEPAK Arteaga-CNP

## 2024-08-14 ENCOUNTER — SPECIALTY PHARMACY (OUTPATIENT)
Dept: PHARMACY | Facility: CLINIC | Age: 54
End: 2024-08-14

## 2024-08-14 DIAGNOSIS — Z17.1 MALIGNANT NEOPLASM OF UPPER-OUTER QUADRANT OF RIGHT BREAST IN FEMALE, ESTROGEN RECEPTOR NEGATIVE (MULTI): ICD-10-CM

## 2024-08-14 DIAGNOSIS — C50.411 MALIGNANT NEOPLASM OF UPPER-OUTER QUADRANT OF RIGHT BREAST IN FEMALE, ESTROGEN RECEPTOR NEGATIVE (MULTI): ICD-10-CM

## 2024-08-14 RX ORDER — TAMOXIFEN CITRATE 20 MG/1
20 TABLET ORAL DAILY
Qty: 90 TABLET | Refills: 0 | Status: SHIPPED | OUTPATIENT
Start: 2024-08-14 | End: 2024-11-12

## 2024-08-19 ENCOUNTER — HOSPITAL ENCOUNTER (OUTPATIENT)
Dept: RADIOLOGY | Facility: HOSPITAL | Age: 54
Discharge: HOME | End: 2024-08-19
Payer: COMMERCIAL

## 2024-08-19 VITALS — BODY MASS INDEX: 32.49 KG/M2 | WEIGHT: 195 LBS | HEIGHT: 65 IN

## 2024-08-19 DIAGNOSIS — C50.411 MALIGNANT NEOPLASM OF UPPER-OUTER QUADRANT OF RIGHT BREAST IN FEMALE, ESTROGEN RECEPTOR NEGATIVE (MULTI): ICD-10-CM

## 2024-08-19 DIAGNOSIS — Z17.1 MALIGNANT NEOPLASM OF UPPER-OUTER QUADRANT OF RIGHT BREAST IN FEMALE, ESTROGEN RECEPTOR NEGATIVE (MULTI): ICD-10-CM

## 2024-08-19 PROCEDURE — 77061 BREAST TOMOSYNTHESIS UNI: CPT | Mod: LEFT SIDE | Performed by: STUDENT IN AN ORGANIZED HEALTH CARE EDUCATION/TRAINING PROGRAM

## 2024-08-19 PROCEDURE — 77061 BREAST TOMOSYNTHESIS UNI: CPT | Mod: LT

## 2024-08-19 PROCEDURE — 77065 DX MAMMO INCL CAD UNI: CPT | Mod: LEFT SIDE | Performed by: STUDENT IN AN ORGANIZED HEALTH CARE EDUCATION/TRAINING PROGRAM

## 2024-08-19 PROCEDURE — 76642 ULTRASOUND BREAST LIMITED: CPT | Mod: LEFT SIDE | Performed by: STUDENT IN AN ORGANIZED HEALTH CARE EDUCATION/TRAINING PROGRAM

## 2024-08-19 PROCEDURE — 76982 USE 1ST TARGET LESION: CPT | Mod: LT

## 2024-08-19 PROCEDURE — 76642 ULTRASOUND BREAST LIMITED: CPT | Mod: LT

## 2024-09-10 NOTE — PROGRESS NOTES
Subjective :  Patient ID: Lorraine Liu is a 53 y.o. female.    History of Present Illness: Lorraine Liu is a 53 y.o. female with a past medical history of meningioma and R breast cancer s/p mastectomy with immediate reconstruction via tissue expander placement on 3/19/2021. She expressed interest in autologous second stage breast reconstruction and is now s/p R breast reconstruction via EMI free flap on 12/12/23 with Dr. Watkins of Plastic Surgery. She presents today pre-operatively to discuss her EMI revision scheduled for 11/19/24.    Review of Systems  ROS: All 10 systems were reviewed and are unremarkable except for those mentioned in HPI.     Objective :  Physical Exam  Constitutional: NAD  Eyes: EOMI, clear sclera   ENMT: Moist mucous membranes, no apparent injuries or lesions  Head/Neck: NCAT  Cardiovascular: Extremities WWP  Respiratory/Thorax: Unlabored respirations on RA  Gastrointestinal: Abdomen soft, non-distended, non-tender. Incision c/d/intact. R side of incision more hyperpigmented compared to left side. She has a contour irregularity of the left abdomen which is intermittently mildly tender to palpation.     Extremities: MAEx4  Neurological: A&Ox3  Psychological: Appropriate mood and behavior  Skin: Warm and dry with no lesions or rashes  Breast: Right breast with well perfused EMI flap. Suture line intact and well approximated.       Left breast grade III ptosis, hallowing at the superomedial aspect of the right lucas-breast, Right breast, with excess skin and subcutaneous tissue at lateral chest bi;laterally.  Generalized excess subcutaneous tissue at her abdomen with left side dog ear 2x1 cm.     Assessment/Plan :    Lorraine presents today with her  for pre-operative visit prior to EMI flap revision on 11/19/24. We discussed areas of concern and patient's preference for revision. We discussed a superolateral rotation of the tail of the flap and de epithelialization of that section to  reduce the width of the breast and enhance the definition of the lateral breast.    We also discussed filling the hallowing at the superior medial aspect of the breast with fat grafting. And we discussed  liposuction of the lateral aspect of the chest and abdomen for fat grafting. We then discussed left breast reduction and lift with vertical approach. Finally we discussed removal of dog ear at left abdomen.   We have reviewed the above discussed procedures in detail, including perioperative course, length of hospital stay, possible complications of reaction to medication, infection, seroma, bleeding and hematoma, wound healing issues, fat resorption, contour irregularities, asymmetry, dissatisfaction with results, numbness and tingling, need for additional procedures.      Patient and her  expressed good understanding and would like to proceed.     Plan: Pre OP photos.             No lifting greater than 10lbs for 6 weeks

## 2024-09-30 ENCOUNTER — APPOINTMENT (OUTPATIENT)
Dept: PLASTIC SURGERY | Facility: CLINIC | Age: 54
End: 2024-09-30
Payer: COMMERCIAL

## 2024-09-30 VITALS
RESPIRATION RATE: 16 BRPM | WEIGHT: 195 LBS | BODY MASS INDEX: 32.49 KG/M2 | HEART RATE: 73 BPM | HEIGHT: 65 IN | SYSTOLIC BLOOD PRESSURE: 126 MMHG | DIASTOLIC BLOOD PRESSURE: 84 MMHG

## 2024-09-30 DIAGNOSIS — N65.1 DEFORMITY AND DISPROPORTION OF RECONSTRUCTED BREAST: ICD-10-CM

## 2024-09-30 DIAGNOSIS — N65.1 BREAST ASYMMETRY BETWEEN NATIVE BREAST AND RECONSTRUCTED BREAST: Primary | ICD-10-CM

## 2024-09-30 DIAGNOSIS — L98.7 EXCESS SKIN OF ABDOMEN: ICD-10-CM

## 2024-09-30 DIAGNOSIS — L98.7 EXCESS SKIN OF BREAST: ICD-10-CM

## 2024-09-30 DIAGNOSIS — N65.0 DEFORMITY AND DISPROPORTION OF RECONSTRUCTED BREAST: ICD-10-CM

## 2024-09-30 PROCEDURE — 3079F DIAST BP 80-89 MM HG: CPT

## 2024-09-30 PROCEDURE — 1036F TOBACCO NON-USER: CPT

## 2024-09-30 PROCEDURE — 3074F SYST BP LT 130 MM HG: CPT

## 2024-09-30 PROCEDURE — 3008F BODY MASS INDEX DOCD: CPT

## 2024-09-30 PROCEDURE — 99213 OFFICE O/P EST LOW 20 MIN: CPT

## 2024-09-30 ASSESSMENT — PAIN SCALES - GENERAL: PAINLEVEL: 0-NO PAIN

## 2024-10-03 ENCOUNTER — SPECIALTY PHARMACY (OUTPATIENT)
Dept: PHARMACY | Facility: CLINIC | Age: 54
End: 2024-10-03

## 2024-10-14 PROCEDURE — RXMED WILLOW AMBULATORY MEDICATION CHARGE

## 2024-10-21 ENCOUNTER — SPECIALTY PHARMACY (OUTPATIENT)
Dept: PHARMACY | Facility: CLINIC | Age: 54
End: 2024-10-21

## 2024-10-25 ENCOUNTER — PHARMACY VISIT (OUTPATIENT)
Dept: PHARMACY | Facility: CLINIC | Age: 54
End: 2024-10-25
Payer: COMMERCIAL

## 2024-11-18 ENCOUNTER — ANESTHESIA EVENT (OUTPATIENT)
Dept: OPERATING ROOM | Facility: HOSPITAL | Age: 54
End: 2024-11-18
Payer: COMMERCIAL

## 2024-11-19 ENCOUNTER — ANESTHESIA (OUTPATIENT)
Dept: OPERATING ROOM | Facility: HOSPITAL | Age: 54
End: 2024-11-19
Payer: COMMERCIAL

## 2024-11-19 ENCOUNTER — HOSPITAL ENCOUNTER (OUTPATIENT)
Facility: HOSPITAL | Age: 54
Discharge: HOME | End: 2024-11-20
Payer: COMMERCIAL

## 2024-11-19 DIAGNOSIS — N65.1 DEFORMITY AND DISPROPORTION OF RECONSTRUCTED BREAST: ICD-10-CM

## 2024-11-19 DIAGNOSIS — N65.0 DEFORMITY AND DISPROPORTION OF RECONSTRUCTED BREAST: ICD-10-CM

## 2024-11-19 DIAGNOSIS — L98.7 EXCESSIVE SKIN AND SUBCUTANEOUS TISSUE: ICD-10-CM

## 2024-11-19 DIAGNOSIS — Z98.890 H/O MASTOPEXY: ICD-10-CM

## 2024-11-19 DIAGNOSIS — G89.18 POST-OPERATIVE PAIN: Primary | ICD-10-CM

## 2024-11-19 LAB
ABO GROUP (TYPE) IN BLOOD: NORMAL
ANTIBODY SCREEN: NORMAL
PREGNANCY TEST URINE, POC: NEGATIVE
RH FACTOR (ANTIGEN D): NORMAL

## 2024-11-19 PROCEDURE — 7100000002 HC RECOVERY ROOM TIME - EACH INCREMENTAL 1 MINUTE

## 2024-11-19 PROCEDURE — 81025 URINE PREGNANCY TEST: CPT | Performed by: ANESTHESIOLOGY

## 2024-11-19 PROCEDURE — 2500000001 HC RX 250 WO HCPCS SELF ADMINISTERED DRUGS (ALT 637 FOR MEDICARE OP)

## 2024-11-19 PROCEDURE — 36415 COLL VENOUS BLD VENIPUNCTURE: CPT | Performed by: ANESTHESIOLOGY

## 2024-11-19 PROCEDURE — 86901 BLOOD TYPING SEROLOGIC RH(D): CPT | Performed by: ANESTHESIOLOGY

## 2024-11-19 PROCEDURE — 2500000004 HC RX 250 GENERAL PHARMACY W/ HCPCS (ALT 636 FOR OP/ED): Performed by: STUDENT IN AN ORGANIZED HEALTH CARE EDUCATION/TRAINING PROGRAM

## 2024-11-19 PROCEDURE — 2500000004 HC RX 250 GENERAL PHARMACY W/ HCPCS (ALT 636 FOR OP/ED): Performed by: ANESTHESIOLOGY

## 2024-11-19 PROCEDURE — 15772 GRFG AUTOL FAT LIPO EA ADDL: CPT

## 2024-11-19 PROCEDURE — 2500000004 HC RX 250 GENERAL PHARMACY W/ HCPCS (ALT 636 FOR OP/ED)

## 2024-11-19 PROCEDURE — 0753T DGTZ GLS MCRSCP SLD LEVEL IV: CPT | Mod: TC,SUR

## 2024-11-19 PROCEDURE — 99232 SBSQ HOSP IP/OBS MODERATE 35: CPT

## 2024-11-19 PROCEDURE — 3600000009 HC OR TIME - EACH INCREMENTAL 1 MINUTE - PROCEDURE LEVEL FOUR

## 2024-11-19 PROCEDURE — 19316 MASTOPEXY: CPT

## 2024-11-19 PROCEDURE — 2500000001 HC RX 250 WO HCPCS SELF ADMINISTERED DRUGS (ALT 637 FOR MEDICARE OP): Performed by: ANESTHESIOLOGY

## 2024-11-19 PROCEDURE — 19380 REVJ RECONSTRUCTED BREAST: CPT

## 2024-11-19 PROCEDURE — 2720000007 HC OR 272 NO HCPCS

## 2024-11-19 PROCEDURE — 3700000001 HC GENERAL ANESTHESIA TIME - INITIAL BASE CHARGE

## 2024-11-19 PROCEDURE — 99212 OFFICE O/P EST SF 10 MIN: CPT | Performed by: STUDENT IN AN ORGANIZED HEALTH CARE EDUCATION/TRAINING PROGRAM

## 2024-11-19 PROCEDURE — 11402 EXC TR-EXT B9+MARG 1.1-2 CM: CPT

## 2024-11-19 PROCEDURE — 88305 TISSUE EXAM BY PATHOLOGIST: CPT | Performed by: SPECIALIST

## 2024-11-19 PROCEDURE — 7100000011 HC EXTENDED STAY RECOVERY HOURLY - NURSING UNIT

## 2024-11-19 PROCEDURE — 7100000001 HC RECOVERY ROOM TIME - INITIAL BASE CHARGE

## 2024-11-19 PROCEDURE — 3700000002 HC GENERAL ANESTHESIA TIME - EACH INCREMENTAL 1 MINUTE

## 2024-11-19 PROCEDURE — 3600000004 HC OR TIME - INITIAL BASE CHARGE - PROCEDURE LEVEL FOUR

## 2024-11-19 PROCEDURE — 12032 INTMD RPR S/A/T/EXT 2.6-7.5: CPT

## 2024-11-19 PROCEDURE — 15771 GRFG AUTOL FAT LIPO 50 CC/<: CPT

## 2024-11-19 RX ORDER — FENTANYL CITRATE 50 UG/ML
50 INJECTION, SOLUTION INTRAMUSCULAR; INTRAVENOUS EVERY 5 MIN PRN
Status: DISCONTINUED | OUTPATIENT
Start: 2024-11-19 | End: 2024-11-19 | Stop reason: HOSPADM

## 2024-11-19 RX ORDER — MAGNESIUM SULFATE HEPTAHYDRATE 500 MG/ML
INJECTION, SOLUTION INTRAMUSCULAR; INTRAVENOUS AS NEEDED
Status: DISCONTINUED | OUTPATIENT
Start: 2024-11-19 | End: 2024-11-19

## 2024-11-19 RX ORDER — PHENYLEPHRINE HCL IN 0.9% NACL 0.4MG/10ML
SYRINGE (ML) INTRAVENOUS AS NEEDED
Status: DISCONTINUED | OUTPATIENT
Start: 2024-11-19 | End: 2024-11-19

## 2024-11-19 RX ORDER — ONDANSETRON 4 MG/1
4 TABLET, FILM COATED ORAL EVERY 8 HOURS PRN
Status: DISCONTINUED | OUTPATIENT
Start: 2024-11-19 | End: 2024-11-20 | Stop reason: HOSPADM

## 2024-11-19 RX ORDER — FENTANYL CITRATE 50 UG/ML
INJECTION, SOLUTION INTRAMUSCULAR; INTRAVENOUS AS NEEDED
Status: DISCONTINUED | OUTPATIENT
Start: 2024-11-19 | End: 2024-11-19

## 2024-11-19 RX ORDER — CEFAZOLIN SODIUM 1 G/50ML
1 SOLUTION INTRAVENOUS EVERY 8 HOURS
Status: DISCONTINUED | OUTPATIENT
Start: 2024-11-19 | End: 2024-11-20 | Stop reason: HOSPADM

## 2024-11-19 RX ORDER — PROPOFOL 10 MG/ML
INJECTION, EMULSION INTRAVENOUS AS NEEDED
Status: DISCONTINUED | OUTPATIENT
Start: 2024-11-19 | End: 2024-11-19

## 2024-11-19 RX ORDER — CEPHALEXIN 500 MG/1
500 CAPSULE ORAL 2 TIMES DAILY
Status: CANCELLED | OUTPATIENT
Start: 2024-11-19

## 2024-11-19 RX ORDER — ACETAMINOPHEN 325 MG/1
975 TABLET ORAL EVERY 6 HOURS SCHEDULED
Status: DISCONTINUED | OUTPATIENT
Start: 2024-11-19 | End: 2024-11-20 | Stop reason: HOSPADM

## 2024-11-19 RX ORDER — CEFAZOLIN 1 G/1
INJECTION, POWDER, FOR SOLUTION INTRAVENOUS AS NEEDED
Status: DISCONTINUED | OUTPATIENT
Start: 2024-11-19 | End: 2024-11-19

## 2024-11-19 RX ORDER — TRAMADOL HYDROCHLORIDE 50 MG/1
50 TABLET ORAL EVERY 6 HOURS SCHEDULED
Status: DISCONTINUED | OUTPATIENT
Start: 2024-11-19 | End: 2024-11-20 | Stop reason: HOSPADM

## 2024-11-19 RX ORDER — ONDANSETRON HYDROCHLORIDE 2 MG/ML
4 INJECTION, SOLUTION INTRAVENOUS EVERY 8 HOURS PRN
Status: DISCONTINUED | OUTPATIENT
Start: 2024-11-19 | End: 2024-11-20 | Stop reason: HOSPADM

## 2024-11-19 RX ORDER — ONDANSETRON HYDROCHLORIDE 2 MG/ML
4 INJECTION, SOLUTION INTRAVENOUS ONCE AS NEEDED
Status: COMPLETED | OUTPATIENT
Start: 2024-11-19 | End: 2024-11-19

## 2024-11-19 RX ORDER — FENTANYL CITRATE 50 UG/ML
25 INJECTION, SOLUTION INTRAMUSCULAR; INTRAVENOUS EVERY 5 MIN PRN
Status: DISCONTINUED | OUTPATIENT
Start: 2024-11-19 | End: 2024-11-19 | Stop reason: HOSPADM

## 2024-11-19 RX ORDER — ACETAMINOPHEN 325 MG/1
975 TABLET ORAL ONCE
Status: COMPLETED | OUTPATIENT
Start: 2024-11-19 | End: 2024-11-19

## 2024-11-19 RX ORDER — CEPHALEXIN 500 MG/1
500 CAPSULE ORAL 2 TIMES DAILY
Qty: 20 CAPSULE | Refills: 0 | Status: SHIPPED | OUTPATIENT
Start: 2024-11-19 | End: 2024-11-29

## 2024-11-19 RX ORDER — MIDAZOLAM HYDROCHLORIDE 1 MG/ML
INJECTION INTRAMUSCULAR; INTRAVENOUS AS NEEDED
Status: DISCONTINUED | OUTPATIENT
Start: 2024-11-19 | End: 2024-11-19

## 2024-11-19 RX ORDER — ACETAMINOPHEN 500 MG
1000 TABLET ORAL EVERY 6 HOURS PRN
Qty: 30 TABLET | Refills: 0 | Status: SHIPPED | OUTPATIENT
Start: 2024-11-19

## 2024-11-19 RX ORDER — OXYCODONE HYDROCHLORIDE 5 MG/1
5 TABLET ORAL EVERY 4 HOURS PRN
Status: DISCONTINUED | OUTPATIENT
Start: 2024-11-19 | End: 2024-11-19 | Stop reason: HOSPADM

## 2024-11-19 RX ORDER — HYDRALAZINE HYDROCHLORIDE 20 MG/ML
5 INJECTION INTRAMUSCULAR; INTRAVENOUS EVERY 30 MIN PRN
Status: DISCONTINUED | OUTPATIENT
Start: 2024-11-19 | End: 2024-11-19 | Stop reason: HOSPADM

## 2024-11-19 RX ORDER — ONDANSETRON HYDROCHLORIDE 2 MG/ML
INJECTION, SOLUTION INTRAVENOUS AS NEEDED
Status: DISCONTINUED | OUTPATIENT
Start: 2024-11-19 | End: 2024-11-19

## 2024-11-19 RX ORDER — METHOCARBAMOL 100 MG/ML
1000 INJECTION, SOLUTION INTRAMUSCULAR; INTRAVENOUS EVERY 8 HOURS
Status: DISCONTINUED | OUTPATIENT
Start: 2024-11-19 | End: 2024-11-20 | Stop reason: HOSPADM

## 2024-11-19 RX ORDER — PHENYLEPHRINE 10 MG/250 ML(40 MCG/ML)IN 0.9 % SOD.CHLORIDE INTRAVENOUS
CONTINUOUS PRN
Status: DISCONTINUED | OUTPATIENT
Start: 2024-11-19 | End: 2024-11-19

## 2024-11-19 RX ORDER — LIDOCAINE HYDROCHLORIDE 10 MG/ML
0.1 INJECTION, SOLUTION INFILTRATION; PERINEURAL ONCE
Status: DISCONTINUED | OUTPATIENT
Start: 2024-11-19 | End: 2024-11-19 | Stop reason: HOSPADM

## 2024-11-19 RX ORDER — LABETALOL HYDROCHLORIDE 5 MG/ML
5 INJECTION, SOLUTION INTRAVENOUS ONCE AS NEEDED
Status: DISCONTINUED | OUTPATIENT
Start: 2024-11-19 | End: 2024-11-19 | Stop reason: HOSPADM

## 2024-11-19 RX ORDER — POLYETHYLENE GLYCOL 3350 17 G/17G
17 POWDER, FOR SOLUTION ORAL DAILY
Status: DISCONTINUED | OUTPATIENT
Start: 2024-11-19 | End: 2024-11-20 | Stop reason: HOSPADM

## 2024-11-19 RX ORDER — TRAMADOL HYDROCHLORIDE 50 MG/1
50 TABLET ORAL EVERY 6 HOURS PRN
Qty: 15 TABLET | Refills: 0 | Status: SHIPPED | OUTPATIENT
Start: 2024-11-19 | End: 2024-11-24

## 2024-11-19 RX ORDER — LIDOCAINE HYDROCHLORIDE 20 MG/ML
INJECTION, SOLUTION INFILTRATION; PERINEURAL AS NEEDED
Status: DISCONTINUED | OUTPATIENT
Start: 2024-11-19 | End: 2024-11-19

## 2024-11-19 RX ORDER — ROPIVACAINE HYDROCHLORIDE 5 MG/ML
INJECTION, SOLUTION EPIDURAL; INFILTRATION; PERINEURAL AS NEEDED
Status: DISCONTINUED | OUTPATIENT
Start: 2024-11-19 | End: 2024-11-19

## 2024-11-19 RX ORDER — TAMOXIFEN CITRATE 20 MG/1
20 TABLET ORAL DAILY
Status: DISCONTINUED | OUTPATIENT
Start: 2024-11-19 | End: 2024-11-20 | Stop reason: HOSPADM

## 2024-11-19 RX ORDER — DROPERIDOL 2.5 MG/ML
0.62 INJECTION, SOLUTION INTRAMUSCULAR; INTRAVENOUS ONCE AS NEEDED
Status: DISCONTINUED | OUTPATIENT
Start: 2024-11-19 | End: 2024-11-19 | Stop reason: HOSPADM

## 2024-11-19 RX ORDER — METHOCARBAMOL 500 MG/1
500 TABLET, FILM COATED ORAL EVERY 6 HOURS PRN
Qty: 28 TABLET | Refills: 0 | Status: SHIPPED | OUTPATIENT
Start: 2024-11-19 | End: 2024-11-26

## 2024-11-19 RX ORDER — METHOCARBAMOL 500 MG/1
500 TABLET, FILM COATED ORAL EVERY 6 HOURS SCHEDULED
Status: CANCELLED | OUTPATIENT
Start: 2024-11-19

## 2024-11-19 RX ORDER — HYDROMORPHONE HYDROCHLORIDE 1 MG/ML
INJECTION, SOLUTION INTRAMUSCULAR; INTRAVENOUS; SUBCUTANEOUS AS NEEDED
Status: DISCONTINUED | OUTPATIENT
Start: 2024-11-19 | End: 2024-11-19

## 2024-11-19 RX ORDER — ROCURONIUM BROMIDE 10 MG/ML
INJECTION, SOLUTION INTRAVENOUS AS NEEDED
Status: DISCONTINUED | OUTPATIENT
Start: 2024-11-19 | End: 2024-11-19

## 2024-11-19 SDOH — SOCIAL STABILITY: SOCIAL INSECURITY
WITHIN THE LAST YEAR, HAVE YOU BEEN RAPED OR FORCED TO HAVE ANY KIND OF SEXUAL ACTIVITY BY YOUR PARTNER OR EX-PARTNER?: NO

## 2024-11-19 SDOH — SOCIAL STABILITY: SOCIAL INSECURITY: WITHIN THE LAST YEAR, HAVE YOU BEEN AFRAID OF YOUR PARTNER OR EX-PARTNER?: NO

## 2024-11-19 SDOH — SOCIAL STABILITY: SOCIAL INSECURITY: WITHIN THE LAST YEAR, HAVE YOU BEEN HUMILIATED OR EMOTIONALLY ABUSED IN OTHER WAYS BY YOUR PARTNER OR EX-PARTNER?: NO

## 2024-11-19 SDOH — ECONOMIC STABILITY: FOOD INSECURITY: HOW HARD IS IT FOR YOU TO PAY FOR THE VERY BASICS LIKE FOOD, HOUSING, MEDICAL CARE, AND HEATING?: PATIENT DECLINED

## 2024-11-19 SDOH — SOCIAL STABILITY: SOCIAL INSECURITY: DO YOU FEEL UNSAFE GOING BACK TO THE PLACE WHERE YOU ARE LIVING?: NO

## 2024-11-19 SDOH — SOCIAL STABILITY: SOCIAL INSECURITY
WITHIN THE LAST YEAR, HAVE YOU BEEN KICKED, HIT, SLAPPED, OR OTHERWISE PHYSICALLY HURT BY YOUR PARTNER OR EX-PARTNER?: NO

## 2024-11-19 SDOH — ECONOMIC STABILITY: HOUSING INSECURITY: IN THE LAST 12 MONTHS, WAS THERE A TIME WHEN YOU WERE NOT ABLE TO PAY THE MORTGAGE OR RENT ON TIME?: NO

## 2024-11-19 SDOH — ECONOMIC STABILITY: HOUSING INSECURITY: AT ANY TIME IN THE PAST 12 MONTHS, WERE YOU HOMELESS OR LIVING IN A SHELTER (INCLUDING NOW)?: NO

## 2024-11-19 SDOH — SOCIAL STABILITY: SOCIAL INSECURITY: DOES ANYONE TRY TO KEEP YOU FROM HAVING/CONTACTING OTHER FRIENDS OR DOING THINGS OUTSIDE YOUR HOME?: NO

## 2024-11-19 SDOH — SOCIAL STABILITY: SOCIAL INSECURITY: ABUSE: ADULT

## 2024-11-19 SDOH — ECONOMIC STABILITY: FOOD INSECURITY: WITHIN THE PAST 12 MONTHS, THE FOOD YOU BOUGHT JUST DIDN'T LAST AND YOU DIDN'T HAVE MONEY TO GET MORE.: NEVER TRUE

## 2024-11-19 SDOH — SOCIAL STABILITY: SOCIAL INSECURITY: HAVE YOU HAD THOUGHTS OF HARMING ANYONE ELSE?: NO

## 2024-11-19 SDOH — ECONOMIC STABILITY: FOOD INSECURITY: WITHIN THE PAST 12 MONTHS, YOU WORRIED THAT YOUR FOOD WOULD RUN OUT BEFORE YOU GOT THE MONEY TO BUY MORE.: NEVER TRUE

## 2024-11-19 SDOH — ECONOMIC STABILITY: INCOME INSECURITY: IN THE PAST 12 MONTHS HAS THE ELECTRIC, GAS, OIL, OR WATER COMPANY THREATENED TO SHUT OFF SERVICES IN YOUR HOME?: NO

## 2024-11-19 SDOH — SOCIAL STABILITY: SOCIAL INSECURITY: HAVE YOU HAD ANY THOUGHTS OF HARMING ANYONE ELSE?: NO

## 2024-11-19 SDOH — SOCIAL STABILITY: SOCIAL INSECURITY: WERE YOU ABLE TO COMPLETE ALL THE BEHAVIORAL HEALTH SCREENINGS?: YES

## 2024-11-19 SDOH — SOCIAL STABILITY: SOCIAL INSECURITY: HAS ANYONE EVER THREATENED TO HURT YOUR FAMILY OR YOUR PETS?: NO

## 2024-11-19 SDOH — ECONOMIC STABILITY: TRANSPORTATION INSECURITY: IN THE PAST 12 MONTHS, HAS LACK OF TRANSPORTATION KEPT YOU FROM MEDICAL APPOINTMENTS OR FROM GETTING MEDICATIONS?: NO

## 2024-11-19 SDOH — SOCIAL STABILITY: SOCIAL INSECURITY: ARE YOU OR HAVE YOU BEEN THREATENED OR ABUSED PHYSICALLY, EMOTIONALLY, OR SEXUALLY BY ANYONE?: NO

## 2024-11-19 SDOH — SOCIAL STABILITY: SOCIAL INSECURITY: DO YOU FEEL ANYONE HAS EXPLOITED OR TAKEN ADVANTAGE OF YOU FINANCIALLY OR OF YOUR PERSONAL PROPERTY?: NO

## 2024-11-19 SDOH — ECONOMIC STABILITY: HOUSING INSECURITY: IN THE PAST 12 MONTHS, HOW MANY TIMES HAVE YOU MOVED WHERE YOU WERE LIVING?: 0

## 2024-11-19 SDOH — SOCIAL STABILITY: SOCIAL INSECURITY: ARE THERE ANY APPARENT SIGNS OF INJURIES/BEHAVIORS THAT COULD BE RELATED TO ABUSE/NEGLECT?: NO

## 2024-11-19 ASSESSMENT — PAIN SCALES - GENERAL
PAINLEVEL_OUTOF10: 0 - NO PAIN
PAINLEVEL_OUTOF10: 6
PAINLEVEL_OUTOF10: 5 - MODERATE PAIN
PAIN_LEVEL: 1
PAINLEVEL_OUTOF10: 4
PAINLEVEL_OUTOF10: 3
PAINLEVEL_OUTOF10: 7
PAINLEVEL_OUTOF10: 5 - MODERATE PAIN
PAINLEVEL_OUTOF10: 5 - MODERATE PAIN
PAINLEVEL_OUTOF10: 0 - NO PAIN
PAINLEVEL_OUTOF10: 5 - MODERATE PAIN
PAINLEVEL_OUTOF10: 0 - NO PAIN
PAINLEVEL_OUTOF10: 0 - NO PAIN
PAINLEVEL_OUTOF10: 6

## 2024-11-19 ASSESSMENT — ACTIVITIES OF DAILY LIVING (ADL)
HEARING - RIGHT EAR: FUNCTIONAL
ADEQUATE_TO_COMPLETE_ADL: NO
LACK_OF_TRANSPORTATION: NO
PATIENT'S MEMORY ADEQUATE TO SAFELY COMPLETE DAILY ACTIVITIES?: NO
LACK_OF_TRANSPORTATION: NO
WALKS IN HOME: INDEPENDENT
BATHING: INDEPENDENT
GROOMING: INDEPENDENT
TOILETING: INDEPENDENT
JUDGMENT_ADEQUATE_SAFELY_COMPLETE_DAILY_ACTIVITIES: NO
DRESSING YOURSELF: INDEPENDENT
HEARING - LEFT EAR: FUNCTIONAL
FEEDING YOURSELF: INDEPENDENT

## 2024-11-19 ASSESSMENT — COLUMBIA-SUICIDE SEVERITY RATING SCALE - C-SSRS
2. HAVE YOU ACTUALLY HAD ANY THOUGHTS OF KILLING YOURSELF?: NO
6. HAVE YOU EVER DONE ANYTHING, STARTED TO DO ANYTHING, OR PREPARED TO DO ANYTHING TO END YOUR LIFE?: NO
1. IN THE PAST MONTH, HAVE YOU WISHED YOU WERE DEAD OR WISHED YOU COULD GO TO SLEEP AND NOT WAKE UP?: NO

## 2024-11-19 ASSESSMENT — COGNITIVE AND FUNCTIONAL STATUS - GENERAL
WALKING IN HOSPITAL ROOM: A LITTLE
DRESSING REGULAR UPPER BODY CLOTHING: A LITTLE
EATING MEALS: A LITTLE
PATIENT BASELINE BEDBOUND: NO
TOILETING: A LITTLE
MOVING FROM LYING ON BACK TO SITTING ON SIDE OF FLAT BED WITH BEDRAILS: A LITTLE
MOBILITY SCORE: 18
DRESSING REGULAR LOWER BODY CLOTHING: A LITTLE
TURNING FROM BACK TO SIDE WHILE IN FLAT BAD: A LITTLE
CLIMB 3 TO 5 STEPS WITH RAILING: A LITTLE
HELP NEEDED FOR BATHING: A LITTLE
PERSONAL GROOMING: A LITTLE
MOBILITY SCORE: 18
DRESSING REGULAR LOWER BODY CLOTHING: A LITTLE
MOVING TO AND FROM BED TO CHAIR: A LITTLE
HELP NEEDED FOR BATHING: A LITTLE
TOILETING: A LITTLE
MOVING TO AND FROM BED TO CHAIR: A LITTLE
CLIMB 3 TO 5 STEPS WITH RAILING: A LITTLE
DAILY ACTIVITIY SCORE: 18
WALKING IN HOSPITAL ROOM: A LITTLE
DRESSING REGULAR UPPER BODY CLOTHING: A LITTLE
TURNING FROM BACK TO SIDE WHILE IN FLAT BAD: A LITTLE
STANDING UP FROM CHAIR USING ARMS: A LITTLE
MOVING FROM LYING ON BACK TO SITTING ON SIDE OF FLAT BED WITH BEDRAILS: A LITTLE
PERSONAL GROOMING: A LITTLE
STANDING UP FROM CHAIR USING ARMS: A LITTLE
DAILY ACTIVITIY SCORE: 19

## 2024-11-19 ASSESSMENT — PAIN - FUNCTIONAL ASSESSMENT
PAIN_FUNCTIONAL_ASSESSMENT: UNABLE TO SELF-REPORT
PAIN_FUNCTIONAL_ASSESSMENT: 0-10
PAIN_FUNCTIONAL_ASSESSMENT: 0-10
PAIN_FUNCTIONAL_ASSESSMENT: UNABLE TO SELF-REPORT
PAIN_FUNCTIONAL_ASSESSMENT: 0-10
PAIN_FUNCTIONAL_ASSESSMENT: 0-10
PAIN_FUNCTIONAL_ASSESSMENT: UNABLE TO SELF-REPORT
PAIN_FUNCTIONAL_ASSESSMENT: 0-10

## 2024-11-19 ASSESSMENT — PATIENT HEALTH QUESTIONNAIRE - PHQ9
SUM OF ALL RESPONSES TO PHQ9 QUESTIONS 1 & 2: 0
1. LITTLE INTEREST OR PLEASURE IN DOING THINGS: NOT AT ALL
2. FEELING DOWN, DEPRESSED OR HOPELESS: NOT AT ALL

## 2024-11-19 ASSESSMENT — LIFESTYLE VARIABLES
SKIP TO QUESTIONS 9-10: 1
HOW OFTEN DO YOU HAVE 6 OR MORE DRINKS ON ONE OCCASION: NEVER
HOW OFTEN DO YOU HAVE A DRINK CONTAINING ALCOHOL: NEVER
HOW MANY STANDARD DRINKS CONTAINING ALCOHOL DO YOU HAVE ON A TYPICAL DAY: PATIENT DOES NOT DRINK
AUDIT-C TOTAL SCORE: 0
AUDIT-C TOTAL SCORE: 0

## 2024-11-19 NOTE — ANESTHESIA PROCEDURE NOTES
Peripheral Block    Patient location during procedure: pre-op  Start time: 11/19/2024 6:48 AM  End time: 11/19/2024 7:01 AM  Reason for block: at surgeon's request and post-op pain management  Staffing  Performed: resident   Authorized by: Shellie Montiel MD    Performed by: Anamika Sepulveda DO  Preanesthetic Checklist  Completed: patient identified, IV checked, site marked, risks and benefits discussed, surgical consent, monitors and equipment checked, pre-op evaluation and timeout performed   Timeout performed at: 11/19/2024 6:48 AM  Peripheral Block  Patient position: sitting  Prep: ChloraPrep  Patient monitoring: heart rate and continuous pulse ox  Block type: JUANIS (R paravertebral)  Injection technique: catheter  Guidance: ultrasound guided  Local infiltration: ropivacaine  Needle  Needle type: short-bevel   Needle gauge: 22 G  Needle length: 8 cm  Needle localization: ultrasound guidance     image stored in chart  Assessment  Injection assessment: negative aspiration for heme, no paresthesia on injection, incremental injection and local visualized surrounding nerve on ultrasound  Additional Notes  Erector spinae plane block: Informed consent obtained.  Risks, benefits, and alternatives discussed.  ASA monitors placed, and timeout performed.  Patient positioned, prepped with chlorhexidine, and draped with sterile towels. Anatomical landmarks clearly marked.    Ultrasound guidance used to visualize the erector spine a muscle above the TP/costal junction at T3.  Good visualization of the needle throughout duration of the procedure.  Aspiration was negative. A total of 30cc was divided and injected bilaterally. Patient tolerated procedure well.    Timeout by INOCENCIO Salguero

## 2024-11-19 NOTE — ANESTHESIA PROCEDURE NOTES
Airway  Date/Time: 11/19/2024 7:53 AM  Urgency: elective    Airway not difficult    Staffing  Performed: SELENE   Authorized by: Vick Ascencio MD    Performed by: NATHALIA Nicole  Patient location during procedure: OR    Indications and Patient Condition  Indications for airway management: anesthesia  Spontaneous Ventilation: absent  Sedation level: deep  Preoxygenated: yes  Patient position: sniffing  MILS maintained throughout  Mask difficulty assessment: 1 - vent by mask    Final Airway Details  Final airway type: endotracheal airway      Successful airway: ETT  Cuffed: yes   Successful intubation technique: direct laryngoscopy  Facilitating devices/methods: intubating stylet  Endotracheal tube insertion site: oral  Blade: Bailey  Blade size: #3  ETT size (mm): 7.0  Cormack-Lehane Classification: grade I - full view of glottis  Placement verified by: chest auscultation and capnometry   Measured from: lips  ETT to lips (cm): 22  Number of attempts at approach: 1

## 2024-11-19 NOTE — BRIEF OP NOTE
Date: 2024  OR Location: [facility] OR    Name: Lorraine Liu, : 1970, Age: 54 y.o., MRN: [id number], Sex: female    Diagnosis  Pre-op Diagnosis      * Deformity and disproportion of reconstructed breast [N65.0, N65.1]     * Excessive skin and subcutaneous tissue [L98.7] Post-op Diagnosis     * Deformity and disproportion of reconstructed breast [N65.0, N65.1]     * Excessive skin and subcutaneous tissue [L98.7]     Procedures  RIGHT BREAST REVISION OF RECONSTRUCTED BREAST  95418 - NC REVISION OF RECONSTRUCTED BREAST    REDUCTION LEFT BREAST  30253 - NC MASTOPEXY    FAT HARVEST WITH GRAFT TO RIGHT BREAST/EXCISION OF EXTRA SKIN FROM ABDOMEN  07268 - NC GRAFTING OF AUTOLOGOUS FAT BY LIPO EA ADDL 50 CC      Surgeons      * Leigh Watkins - Primary    Resident/Fellow/Other Assistant:  Surgeons and Role:     * Kiersten Boucher MD - Resident - Assisting    Staff:   Circulator: Alexander Dorado Person: Kain Dorado Person: Junior Hale Scrub: Amy Hale Circulator: Amy    Anesthesia Staff: Anesthesiologist: Vick Ascencio MD  C-AA: NATHALIA Nicole; NATHALIA Breen    Procedure Summary  Anesthesia: General  ASA: III  Estimated Blood Loss: 20mL  Intra-op Medications:   Administrations occurring from 0700 to 1300 on 24:   Medication Name Total Dose   ceFAZolin (Ancef) vial 1 g 4 g   dexAMETHasone (Decadron) injection 4 mg/mL 10 mg   fentaNYL (Sublimaze) injection 50 mcg/mL 100 mcg   HYDROmorphone (Dilaudid) injection 1 mg/mL 1 mg   LR bolus Cannot be calculated   lidocaine (Xylocaine) injection 2 % 100 mg   magnesium sulfate 50 % injection 2 g   midazolam PF (Versed) injection 1 mg/mL 2 mg   ondansetron (Zofran) 2 mg/mL injection 4 mg   phenylephrine (Wilfredo-Synephrine) 10 mg in sodium chloride 0.9% 250 mL (0.04 mg/mL) infusion (premix) 0.21 mg   phenylephrine 40 mcg/mL syringe 10 mL 280 mcg   propofol (Diprivan) injection 10 mg/mL 400 mg   rocuronium (ZeMuron) 50 mg/5 mL injection 170 mg               Anesthesia Record               Intraprocedure I/O Totals          Intake    Phenylephrine Drip 0.00 mL    The total shown is the total volume documented since Anesthesia Start was filed.    Total Intake 0 mL       Output    Urine 190 mL    Total Output 190 mL       Net    Net Volume -190 mL          Specimen:   ID Type Source Tests Collected by Time   1 : LEFT BREAST TISSUE Tissue BREAST MASTECTOMY LEFT SURGICAL PATHOLOGY EXAM Leigh Watkins MD 11/19/2024 1026                  Findings: R prior EMI flap revision, L mastopexy, liposuction to abdomen and BL axillae, fat grafting to R superior breast    Complications:  None; patient tolerated the procedure well.     Disposition: PACU - hemodynamically stable.  Condition: stable  Specimens Collected:   ID Type Source Tests Collected by Time   1 : LEFT BREAST TISSUE Tissue BREAST MASTECTOMY LEFT SURGICAL PATHOLOGY EXAM Leigh Watkins MD 11/19/2024 1023     Attending Attestation:     Leigh Watkins  Phone Number: [phone]

## 2024-11-19 NOTE — ANESTHESIA PREPROCEDURE EVALUATION
"Patient: Lorraine Liu    Procedure Information       Date/Time: 11/19/24 0700    Procedures:       Breast Reconstruction Revision w/Free Flap (Right)      Mastopexy (Left)      Insertion Adipose Tissue Breast (Right)    Location: [facility] OR 10 / [facility]    Surgeons: Leigh Watkins MD          54F w/ PMH of meningioma & R breast cancer s/p mastectomy w/ EMI flap undergoing procedure listed above    Relevant Problems   Cardiac   (+) Hyperlipidemia   (+) Hypertension      GYN   (+) Malignant neoplasm of upper-outer quadrant of right breast in female, estrogen receptor negative       Clinical information reviewed:   Tobacco  Allergies  Meds   Med Hx  Surg Hx  OB Status  Fam Hx  Soc   Hx        NPO Detail:  NPO/Void Status  Carbohydrate Drink Given Prior to Surgery? : N  Date of Last Liquid: 11/19/24  Time of Last Liquid: 0500  Date of Last Solid: 11/18/24  Time of Last Solid: 2000  Last Intake Type: Clear fluids  Time of Last Void: 0600    Visit Vitals  /80 (Patient Position: Sitting)   Pulse 83   Temp 36.1 °C (97 °F) (Temporal)   Resp 16   Ht 1.651 m (5' 5\")   Wt 87.8 kg (193 lb 9 oz)   LMP  (LMP Unknown) Comment: urine pregnancy negative   SpO2 97%   BMI 32.21 kg/m²   OB Status Hysterectomy   Smoking Status Never   BSA 2.01 m²        Lab Results   Component Value Date    ALBUMIN 4.1 02/09/2024    ALT 51 (H) 02/09/2024    AST 61 (H) 02/09/2024    BUN 18 02/09/2024    CALCIUM 9.2 02/09/2024     02/09/2024    CHOL 222 (H) 02/09/2024    CO2 22 (L) 02/09/2024    CREATININE 1.00 02/09/2024    HDL 44.0 (L) 02/09/2024    HCT 36.4 02/05/2024    HGB 11.8 (L) 02/05/2024    HGBA1C 6.5 02/16/2024    MG 2.23 12/16/2023    PHOS 4.1 12/18/2023     02/05/2024    K 4.3 02/09/2024     02/09/2024    TRIG 134 02/09/2024    WBC 10.2 02/05/2024       Scheduled medications    Continuous medications    PRN medications      No current facility-administered medications on file prior to " encounter.     Current Outpatient Medications on File Prior to Encounter   Medication Sig Dispense Refill    lisinopriL-hydrochlorothiazide 20-12.5 mg tablet Take 2 tablets by mouth once daily 180 tablet 3           Physical Exam    Airway  Mallampati: II  TM distance: >3 FB     Cardiovascular - normal exam     Dental - normal exam     Pulmonary - normal exam     Abdominal - normal exam             Anesthesia Plan    History of general anesthesia?: yes  History of complications of general anesthesia?: no    ASA 3     general     intravenous induction   Postoperative administration of opioids is intended.  Trial extubation is planned.  Anesthetic plan and risks discussed with patient.  Use of blood products discussed with patient who consented to blood products.    Plan discussed with CAA and attending.

## 2024-11-19 NOTE — ANESTHESIA POSTPROCEDURE EVALUATION
Patient: Lorraine Liu    Procedure Summary       Date: 11/19/24 Room / Location: [facility] OR 10 / [facility] OR    Anesthesia Start: 0745 Anesthesia Stop: 1259    Procedures:       RIGHT BREAST REVISION OF RECONSTRUCTED BREAST (Right)      REDUCTION LEFT BREAST (Left)      FAT HARVEST WITH GRAFT TO RIGHT BREAST/EXCISION OF EXTRA SKIN FROM ABDOMEN (Right) Diagnosis:       Deformity and disproportion of reconstructed breast      Excessive skin and subcutaneous tissue      (Deformity and disproportion of reconstructed breast [N65.0, N65.1])      (Excessive skin and subcutaneous tissue [L98.7])    Surgeons: Leigh Watkins MD Responsible Provider: Vick Ascencio MD    Anesthesia Type: general ASA Status: 3            Anesthesia Type: general    Vitals Value Taken Time   /65 11/19/24 1245   Temp 36 °C (96.8 °F) 11/19/24 1245   Pulse 82 11/19/24 1256   Resp 14 11/19/24 1256   SpO2 100 % 11/19/24 1256   Vitals shown include unfiled device data.    Anesthesia Post Evaluation    Patient location during evaluation: PACU  Patient participation: complete - patient participated  Level of consciousness: awake  Pain score: 1  Pain management: adequate  Multimodal analgesia pain management approach  Airway patency: patent  Two or more strategies used to mitigate risk of obstructive sleep apnea  Cardiovascular status: acceptable  Respiratory status: acceptable, airway suctioned and face mask  Hydration status: acceptable  Postoperative Nausea and Vomiting: none    No notable events documented.

## 2024-11-19 NOTE — PROGRESS NOTES
"  Department of Plastic and Reconstructive Surgery  Daily Progress Note/Post op check    Patient Name: Lorraine Liu  MRN: [id number]  Date:  11/19/24     Subjective  Found resting comfortably in PACU now s/p right breast revision of reconstruction, reduction of left breast, and fat harvest with graft to right breast/excision of extra skin from abdomen. Currently rates pain 5-7/10, described as soreness on left abdomen. Reports emesis ~500cc shortly after coming out of surgery, described as clear. Does not feel like she is ready for discharge today, but would prefer to stay tonight. Denies any fever, chills, night sweats, CP, SOB, palpitations, current nausea    Objective    Vital Signs  /72   Pulse 84   Temp 36 °C (96.8 °F) (Temporal)   Resp 14   Ht 1.651 m (5' 5\")   Wt 87.8 kg (193 lb 9 oz)   LMP  (LMP Unknown) Comment: urine pregnancy negative  SpO2 95%   BMI 32.21 kg/m²      Physical Exam   Constitutional: Alert, awake, calm and cooperative,  at bedside. NAD  Eyes: EOMI, PERRL  HENT: MMM, no apparent injuries or lesions  Head/neck: NC/AT  Cardiovascular: Normal rate and regular rhythm. 2+ equal pulses of the distal extremities.  Respiratory/Thorax: CTAB, regular respirations on RA. Good symmetric chest expansion.   Gastrointestinal: Abdomen soft, appropriately tender, slightly edematous. B/L steri strips at liposites, left with scant serous drainage. ABD binder in place.   Genitourinary: voiding independently   Extremities: No peripheral edema. Moving all 4 extremities actively.   Neurological: A&Ox3.   Psychological: Appropriate mood and behavior.    B/L Breast: Right breast with transverse incision dressed with steri strips, no areas of drainage present. Left breast with t-point and jorge-areolar region dressed with steri strips, no areas of drainage/strikethrough  Diagnostics   Results for orders placed or performed during the hospital encounter of 11/19/24 (from the past 24 hours)   Type " And Screen   Result Value Ref Range    ABO TYPE B     Rh TYPE POS     ANTIBODY SCREEN NEG    POCT pregnancy, urine manually resulted   Result Value Ref Range    Preg Test, Ur Negative Negative     No results found.    Current Medications  Scheduled medications  lidocaine, 0.1 mL, subcutaneous, Once      Continuous medications     PRN medications  PRN medications: droperidol, fentaNYL PF, fentaNYL PF, hydrALAZINE, HYDROmorphone, labetaloL, oxyCODONE, oxygen, promethazine     Assessment  Lorraine Liu is a 53 y.o. female with a past medical history of meningioma and R breast cancer s/p mastectomy with immediate reconstruction via tissue expander placement on 3/19/2021. She expressed interest in autologous second stage breast reconstruction and is now s/p R breast reconstruction via EMI free flap on 12/12/23 with Dr. Watkins of Plastic Surgery. She is now s/p right breast revision of reconstruction, reduction of left breast, and fat harvest with graft to right breast    Plan/Recommendations  #Right breast revision of reconstruction, reduction of left breast and fat harvest with graft to right breast  - Admit to inpatient under plastic surgery service for post operative pain management  - Continue surgical bra and abdominal binder  - OK to be OOB as tolerated  - Regular diet   - Vsq8h   - Encourage use of IS  - Ancef while admitted to hospital, anticipate discharge with 10 days keflex    #Acute post op pain  - Scheduled tylenol  975 q6 hours  - Robaxin IV scheduled  - Tramadol 50mg scheduled  - Miralax scheduled for opoid/anesthesia induced constipation  - Zofran PRN for post op nausea    #History of breast cancer  - Continue home tamoxifen 20mg daily    #HTN  - Stable, home lisinopril/hydrochlorothiazide held in the immediate post op period, will assess need to resume    FEN/GI:   - Encourage PO fluid intake   - Regular diet   - Bowel regimen with Miralax      DVT ppx:   - Risk score assessed   - Encourage  ambulation/activity   - SCDs while in bed     Disposition: Admit to plastic surgery service on RNF for postoperative surveillance and pain control. Anticipate DC 11/19 if pain controlled.     Patient and plan discussed with Dr. Theodore, DEEPAK-CNP  Plastic and Reconstructive Surgery   Available via Epic chat, pager: [phone] or team phones: [phone]

## 2024-11-19 NOTE — DISCHARGE INSTRUCTIONS
Your incisions are covered in skin glue and tape called steristrips, these will fall off on their own over time, no need to remove.     Wear soft, non-compressive bras. Wear the abdominal binder for comfort.     For pain control, alternate Tylenol, Tramadol, and Robaxin.    You may shower in 48 hours after your surgery. No soaking (baths, swimming) until you have seen Dr. Watkins for follow up. No heavy lifting anything greater than 7 pounds.    Follow up with Dr. Watkins 12/2/2024 for a post operative visit, please call if you have any concerns including uncontrolled pain, fevers, drainage from your incisions.

## 2024-11-19 NOTE — CONSULTS
Lorraine Liu is a 54 y.o. year old female patient who presents for Breast Reconstruction Revision w/Free Flap (Right) with Dr. Watkins on . Acute Pain consulted for block for postoperative pain control.     Anticipated Postop Pain Issues -   Palliative: typically relieved with IV analgesics and regional local anesthetics  Provocative: typically with movement  Quality: typically burning and aching  Radiation: typically none  Severity: typically severe 8-1010  Timing: typically constant    Past Medical History:   Diagnosis Date    Chest pain     Saw Dr. Mclaughlin in 2022    Ductal carcinoma of breast (Multi) 2021    Hypertension     F/W PCP    Melanoma (Multi)     Meningioma (Multi)     Shortness of breath     Ct Calcium Scoring 3/18/2022: IMPRESSION: Coronary artery calcium score of  0.    Stress incontinence         Past Surgical History:   Procedure Laterality Date    BI MAMMO GUIDED BREAST RIGHT LOCALIZATION Right 02/10/2021    BI MAMMO GUIDED LOCALIZATION BREAST RIGHT 2/10/2021 [facility]GAC[facility]    BI STEREOTACTIC GUIDED BREAST RIGHT LOCALIZATION AND BIOPSY Right 2021    BI STEREOTACTIC GUIDED BREAST LOCALIZATION AND BIOPSY RIGHT [facility]    BI US GUIDED BREAST LEFT CYST ASPIRATION Left 2016    BI BREAST CYST ASPIRATION LEFT [facility]    BI US GUIDED BREAST LOCALIZATION AND BIOPSY RIGHT Right 2021    BI US GUIDED BREAST LOCALIZATION AND BIOPSY RIGHT [facility]    BREAST LUMPECTOMY Right 02/10/2021     SECTION, LOW TRANSVERSE      x2    CT ABDOMEN PELVIS ANGIOGRAM W AND/OR WO IV CONTRAST  2023    CT ABDOMEN PELVIS ANGIOGRAM W AND/OR WO IV CONTRAST 2023 [id number] CT    HYSTERECTOMY      d/t heavy menses    MASTECTOMY Right     OTHER SURGICAL HISTORY  2021    Breast reconstruction    OTHER SURGICAL HISTORY      Melanoma Excision        Family History   Problem Relation Name Age of Onset    Diabetes Mother      Melanoma  Father      Thyroid cancer Father      Melanoma Brother      Leukemia Brother      Other (brain tumor) Brother          Social History     Socioeconomic History    Marital status:      Spouse name: Not on file    Number of children: Not on file    Years of education: Not on file    Highest education level: Not on file   Occupational History    Not on file   Tobacco Use    Smoking status: Never     Passive exposure: Never    Smokeless tobacco: Never   Vaping Use    Vaping status: Never Used   Substance and Sexual Activity    Alcohol use: Never    Drug use: Never    Sexual activity: Yes     Partners: Male   Other Topics Concern    Not on file   Social History Narrative    Not on file     Social Drivers of Health     Financial Resource Strain: Low Risk  (12/17/2023)    Overall Financial Resource Strain (CARDIA)     Difficulty of Paying Living Expenses: Not hard at all   Food Insecurity: Not on file   Transportation Needs: No Transportation Needs (12/17/2023)    PRAPARE - Transportation     Lack of Transportation (Medical): No     Lack of Transportation (Non-Medical): No   Physical Activity: Not on file   Stress: Not on file   Social Connections: Not on file   Intimate Partner Violence: Not on file   Housing Stability: Low Risk  (12/17/2023)    Housing Stability Vital Sign     Unable to Pay for Housing in the Last Year: No     Number of Places Lived in the Last Year: 1     Unstable Housing in the Last Year: No      No Known Allergies      Review of Systems  Gen: No fatigue, anorexia, insomnia, fever.   Eyes: No vision loss, double vision, drainage, eye pain.   ENT: No pharyngitis, dry mouth, no hearing changes or ear discharge  Cardiac: No chest pain, palpitations, syncope, near syncope.   Pulmonary: No shortness of breath, cough, hemoptysis.   Heme/lymph: No swollen glands, fever, bleeding.   GI: No abdominal pain, change in bowel habits, melena, hematemesis, hematochezia, nausea, vomiting, diarrhea.   : No  discharge, dysuria, frequency, urgency, hematuria.  Endo: No polyuria or weight loss.   Musculoskeletal: Negative for any pain or loss of ROM/weakness  Skin: No rashes or lesions  Neuro: Normal speech, no numbness or weakness. No gait difficulties  Review of systems is otherwise negative unless stated above or in history of present illness.    Physical Exam:  Constitutional:  no distress, alert and cooperative  Eyes: clear sclera  Head/Neck: No apparent injury, trachea midline  Respiratory/Thorax: Patent airways, thorax symmetric, breathing comfortably  Cardiovascular: no pitting edema  Gastrointestinal: Nondistended  Musculoskeletal: ROM intact  Extremities: no clubbing  Neurological: alert, sánchez x4  Psychological: Appropriate affect    No results found for this or any previous visit (from the past 24 hours).     Lorraine Liu is a 54 y.o. year old female patient who presents for Breast Reconstruction Revision w/Free Flap (Right) with Dr. Watkins on 11/19. Acute Pain consulted for block for postoperative pain control.     Plan:  - R paravertebral and JUANIS and L JUANIS single shot blocks performed preoperatively on 11/19  - Pain medications per primary team  - Will see on POD1 if inpatient    Acute Pain Team  pg [phone] ph [phone].

## 2024-11-19 NOTE — H&P
Updated History and Physical  Patient seen in the preoperative area. Patient reports no issues since seen in clinic. She had a sore throat a few weeks ago which has since resolved. She denies any issues with her incisions and denies any pain. Consent signed and questions answered. Please see clinic note copied below.    Patient discussed with Dr. Watkins.    Kiersten Boucher MD  General Surgery PGY2      Clinic Note:  Patient ID: Lorraine Liu is a 53 y.o. female.     History of Present Illness: Lorraine Liu is a 53 y.o. female with a past medical history of meningioma and R breast cancer s/p mastectomy with immediate reconstruction via tissue expander placement on 3/19/2021. She expressed interest in autologous second stage breast reconstruction and is now s/p R breast reconstruction via EMI free flap on 12/12/23 with Dr. Watkins of Plastic Surgery. She presents today pre-operatively to discuss her EMI revision scheduled for 11/19/24.     Review of Systems  ROS: All 10 systems were reviewed and are unremarkable except for those mentioned in HPI.         Objective  :  Physical Exam  Constitutional: NAD  Eyes: EOMI, clear sclera   ENMT: Moist mucous membranes, no apparent injuries or lesions  Head/Neck: NCAT  Cardiovascular: Extremities WWP  Respiratory/Thorax: Unlabored respirations on RA  Gastrointestinal: Abdomen soft, non-distended, non-tender. Incision c/d/intact. R side of incision more hyperpigmented compared to left side. She has a contour irregularity of the left abdomen which is intermittently mildly tender to palpation.     Extremities: MAEx4  Neurological: A&Ox3  Psychological: Appropriate mood and behavior  Skin: Warm and dry with no lesions or rashes  Breast: Right breast with well perfused EMI flap. Suture line intact and well approximated.        Left breast grade III ptosis, hallowing at the superomedial aspect of the right lucas-breast, Right breast, with excess skin and subcutaneous tissue at  lateral chest bi;laterally.  Generalized excess subcutaneous tissue at her abdomen with left side dog ear 2x1 cm.         Assessment/Plan  :     Lorraine presents today with her  for pre-operative visit prior to EMI flap revision on 11/19/24. We discussed areas of concern and patient's preference for revision. We discussed a superolateral rotation of the tail of the flap and de epithelialization of that section to reduce the width of the breast and enhance the definition of the lateral breast.    We also discussed filling the hallowing at the superior medial aspect of the breast with fat grafting. And we discussed  liposuction of the lateral aspect of the chest and abdomen for fat grafting. We then discussed left breast reduction and lift with vertical approach. Finally we discussed removal of dog ear at left abdomen.   We have reviewed the above discussed procedures in detail, including perioperative course, length of hospital stay, possible complications of reaction to medication, infection, seroma, bleeding and hematoma, wound healing issues, fat resorption, contour irregularities, asymmetry, dissatisfaction with results, numbness and tingling, need for additional procedures.        Patient and her  expressed good understanding and would like to proceed.      Plan: Pre OP photos.

## 2024-11-20 VITALS
DIASTOLIC BLOOD PRESSURE: 82 MMHG | RESPIRATION RATE: 16 BRPM | TEMPERATURE: 97.3 F | BODY MASS INDEX: 32.25 KG/M2 | OXYGEN SATURATION: 94 % | SYSTOLIC BLOOD PRESSURE: 131 MMHG | WEIGHT: 193.56 LBS | HEART RATE: 80 BPM | HEIGHT: 65 IN

## 2024-11-20 PROCEDURE — 99231 SBSQ HOSP IP/OBS SF/LOW 25: CPT | Performed by: STUDENT IN AN ORGANIZED HEALTH CARE EDUCATION/TRAINING PROGRAM

## 2024-11-20 PROCEDURE — 2500000004 HC RX 250 GENERAL PHARMACY W/ HCPCS (ALT 636 FOR OP/ED): Mod: JZ

## 2024-11-20 PROCEDURE — 2500000001 HC RX 250 WO HCPCS SELF ADMINISTERED DRUGS (ALT 637 FOR MEDICARE OP)

## 2024-11-20 PROCEDURE — 2500000004 HC RX 250 GENERAL PHARMACY W/ HCPCS (ALT 636 FOR OP/ED)

## 2024-11-20 PROCEDURE — 7100000011 HC EXTENDED STAY RECOVERY HOURLY - NURSING UNIT

## 2024-11-20 PROCEDURE — 99238 HOSP IP/OBS DSCHRG MGMT 30/<: CPT

## 2024-11-20 ASSESSMENT — PAIN - FUNCTIONAL ASSESSMENT
PAIN_FUNCTIONAL_ASSESSMENT: 0-10
PAIN_FUNCTIONAL_ASSESSMENT: 0-10

## 2024-11-20 ASSESSMENT — PAIN SCALES - GENERAL
PAINLEVEL_OUTOF10: 3
PAINLEVEL_OUTOF10: 3

## 2024-11-20 ASSESSMENT — PAIN DESCRIPTION - ORIENTATION
ORIENTATION: LEFT;LOWER
ORIENTATION: LEFT;LOWER

## 2024-11-20 ASSESSMENT — PAIN DESCRIPTION - LOCATION
LOCATION: ABDOMEN
LOCATION: ABDOMEN

## 2024-11-20 NOTE — CARE PLAN
The patient's goals for the shift include        Problem: Pain  Goal: Takes deep breaths with improved pain control throughout the shift  11/20/2024 0146 by Ninoska Arreaga RN  Outcome: Progressing  11/20/2024 0145 by Ninoska Arreaga RN  Outcome: Progressing  Goal: Turns in bed with improved pain control throughout the shift  11/20/2024 0146 by Ninoska Arreaga RN  Outcome: Progressing  11/20/2024 0145 by Ninoska Arreaga RN  Outcome: Progressing  Goal: Walks with improved pain control throughout the shift  11/20/2024 0146 by Ninoska Arreaga RN  Outcome: Progressing  11/20/2024 0145 by Ninoska Arreaga RN  Outcome: Progressing  Goal: Performs ADL's with improved pain control throughout shift  11/20/2024 0146 by Ninoska Arreaga RN  Outcome: Progressing  11/20/2024 0145 by Ninoska Arreaga RN  Outcome: Progressing  Goal: Participates in PT with improved pain control throughout the shift  11/20/2024 0146 by Ninoska Arreaga RN  Outcome: Progressing  11/20/2024 0145 by Ninoska Arreaga RN  Outcome: Progressing  Goal: Free from opioid side effects throughout the shift  11/20/2024 0146 by Ninoska Arreaga RN  Outcome: Progressing  11/20/2024 0145 by Ninoska Arreaga RN  Outcome: Progressing  Goal: Free from acute confusion related to pain meds throughout the shift  11/20/2024 0146 by Ninoska Arreaga RN  Outcome: Progressing  11/20/2024 0145 by Ninoska Arreaga RN  Outcome: Progressing

## 2024-11-20 NOTE — PROGRESS NOTES
Postop Pain HPI -   Palliative: relieved with IV analgesics and regional local anesthetics  Provocative: movement  Quality:  burning and aching  Radiation:  none  Severity:  0/10  Timing: constant    24-HOUR OPIOID CONSUMPTION:  Tylenol 975 x2, tramadol 50 x2, robaxin 1000 x2    Scheduled medications  acetaminophen, 975 mg, oral, q6h VISH  ceFAZolin, 1 g, intravenous, q8h  [Held by provider] lisinopril 20 mg, hydroCHLOROthiazide 12.5 mg for Zestoretic/Prinizide, , oral, Daily  methocarbamol, 1,000 mg, intravenous, q8h  polyethylene glycol, 17 g, oral, Daily  tamoxifen, 20 mg, oral, Daily  traMADol, 50 mg, oral, q6h VISH      Continuous medications     PRN medications  PRN medications: ondansetron **OR** ondansetron     Physical Exam:  Constitutional:  no distress, alert and cooperative  Eyes: clear sclera  Head/Neck: No apparent injury, trachea midline  Respiratory/Thorax: Patent airways, thorax symmetric, breathing comfortably  Cardiovascular: no pitting edema  Gastrointestinal: Nondistended  Musculoskeletal: ROM intact  Extremities: no clubbing  Neurological: alert, sánchez x4  Psychological: Appropriate affect    No results found. However, due to the size of the patient record, not all encounters were searched. Please check Results Review for a complete set of results.      Lorraine Liu is a 54 y.o. year old female patient who presents for Breast Reconstruction Revision w/Free Flap (Right) with Dr. Watkins on 11/19. Acute Pain consulted for block for postoperative pain control.      Plan:  - R paravertebral and JUANIS and L JUANIS single shot blocks performed preoperatively on 11/19  - Pain medications per primary team  - Acute pain to sign off      Acute Pain Team  pg [phone] ph [phone].

## 2024-11-20 NOTE — CARE PLAN
Patient afebrile. Vitals stable. Minimal pain. No nausea or vomiting. Some dizziness able to walk.

## 2024-11-20 NOTE — HOSPITAL COURSE
BRIEF HISTORY:    Lorraine Liu is a 54 y.o. female with a past medical history of meningioma and R breast cancer s/p mastectomy with immediate reconstruction via tissue expander placement on 3/19/2021. She expressed interest in autologous second stage breast reconstruction and is now s/p R breast reconstruction via EMI free flap on 12/12/23 with Dr. Watkins of Plastic Surgery. She is now s/p right breast revision of reconstruction, reduction of left breast, and fat harvest with graft to right breast.     HOSPITAL COURSE:    Admitted for post-op pain control and monitoring    DAY OF DISCHARGE:    On the day of discharge, the patient was seen and evaluated by the Plastic Surgery team and deemed suitable for discharge to home.  There were no significant events overnight. Vitals were reviewed and within normal limits. Labs were stable at discharge. On day of discharge the patient was tolerating a diet, pain was controlled on PO pain medication, was ambulating well and voiding spontaneously. The patient was given detailed discharge instructions and were scheduled to follow up as an outpatient.

## 2024-11-20 NOTE — DISCHARGE SUMMARY
Discharge Diagnosis  Acute post-operative pain    Issues Requiring Follow-Up  Follow up appointment (Scheduled 12/2)    Test Results Pending At Discharge  Pending Labs       Order Current Status    Surgical Pathology Exam In process            Hospital Course  BRIEF HISTORY:    Lorraine Liu is a 54 y.o. female with a past medical history of meningioma and R breast cancer s/p mastectomy with immediate reconstruction via tissue expander placement on 3/19/2021. She expressed interest in autologous second stage breast reconstruction and is now s/p R breast reconstruction via EMI free flap on 12/12/23 with Dr. Watkins of Plastic Surgery. She is now s/p right breast revision of reconstruction, reduction of left breast, and fat harvest with graft to right breast.     HOSPITAL COURSE:    Admitted for post-op pain control and monitoring    DAY OF DISCHARGE:    On the day of discharge, the patient was seen and evaluated by the Plastic Surgery team and deemed suitable for discharge to home.  There were no significant events overnight. Vitals were reviewed and within normal limits. Labs were stable at discharge. On day of discharge the patient was tolerating a diet, pain was controlled on PO pain medication, was ambulating well and voiding spontaneously. The patient was given detailed discharge instructions and were scheduled to follow up as an outpatient.       Pertinent Physical Exam At Time of Discharge  Physical Exam  Constitutional: Alert, awake, calm and cooperative,  at bedside. NAD  Eyes: EOMI, PERRL  HENT: MMM, no apparent injuries or lesions  Head/neck: NC/AT  Cardiovascular: Normal rate and regular rhythm. 2+ equal pulses of the distal extremities.  Respiratory/Thorax: CTAB, regular respirations on RA. Good symmetric chest expansion.   Gastrointestinal: Abdomen soft, appropriately tender, slightly edematous. B/L steri strips at liposites, left with scant serous drainage. ABD binder in place.    Genitourinary: voiding independently   Extremities: No peripheral edema. Moving all 4 extremities actively.   Neurological: A&Ox3.   Psychological: Appropriate mood and behavior.    B/L Breast: Right breast with transverse incision dressed with steri strips, no areas of drainage or dehiscencepresent. Left breast with t-point and jorge-areolar incision dressed with steri strips, no areas of drainage/strikethrough. Mild ecchymosis present around incision.  Home Medications     Medication List      START taking these medications     acetaminophen 500 mg tablet; Commonly known as: Tylenol; Take 2 tablets   (1,000 mg) by mouth every 6 hours if needed for mild pain (1 - 3).   cephalexin 500 mg capsule; Commonly known as: Keflex; Take 1 capsule   (500 mg) by mouth 2 times a day for 10 days.   methocarbamol 500 mg tablet; Commonly known as: Robaxin; Take 1 tablet   (500 mg) by mouth every 6 hours if needed for muscle spasms (For moderate   to severe pain) for up to 7 days.   traMADol 50 mg tablet; Commonly known as: Ultram; Take 1 tablet (50 mg)   by mouth every 6 hours if needed for severe pain (7 - 10) for up to 5   days.     CONTINUE taking these medications     lisinopriL-hydrochlorothiazide 20-12.5 mg tablet; Take 2 tablets by   mouth once daily   tamoxifen 20 mg tablet; Commonly known as: Nolvadex; Take 1 tablet (20   mg) by mouth once a day       Outpatient Follow-Up  Future Appointments   Date Time Provider Department Center   12/2/2024  8:15 AM Leigh Watkins MD [id number]   2/6/2025 10:00 AM Amy Foy PA-C [id number]       Lenny Thapa, APRN-CNP

## 2024-11-20 NOTE — PROGRESS NOTES
11/20/24 1100   Discharge Planning   Living Arrangements Spouse/significant other   Support Systems Spouse/significant other   Assistance Needed none   Type of Residence Private residence   Who is requesting discharge planning? Provider   Home or Post Acute Services None   Expected Discharge Disposition Home     This SW/TCC met with pt to introduce self and role.  Per the medical team, this patient has no anticipated discharge needs; full assessment deferred at this time.  Please advise SW/TCC if discharge planning needs arise.  Jammie Villasenor RN TCC

## 2024-11-20 NOTE — PROGRESS NOTES
Pharmacy Medication History Review    Lorraine Liu is a 54 y.o. female admitted for Acute post-operative pain. Pharmacy reviewed the patient's [id number] medications and allergies for accuracy.    The list below reflects the updated PTA list.   Prior to Admission Medications   Prescriptions Last Dose Informant   lisinopriL-hydrochlorothiazide 20-12.5 mg tablet 11/18/2024 Evening Self   Sig: Take 2 tablets by mouth once dailyPt has been taking differently: 1 everyday     tamoxifen (Nolvadex) 20 mg tablet Past Week Evening Self   Sig: Take 1 tablet (20 mg) by mouth once a day      Facility-Administered Medications: None        The list below reflects the updated allergy list. Please review each documented allergy for additional clarification and justification.  Allergies  Reviewed by Meng Lopez PharmD on 11/20/2024   No Known Allergies         Patient declines M2B at discharge. Pharmacy has been updated to [facility] in [place].    Sources used to complete the med history include:    Pharmacy dispense history  Patient interview Good historian  Chart Review  Care Everywhere     Below are additional concerns with the patient's PTA list.  . Patient has been taking Lisinopril/hydrochlorothiazide 1 tab daily isntead of 2 tabs.    Medications ADDED:  None   Medications CHANGED:  None   Medications REMOVED:   None     Meng Lopez PharmD  Transitions of Care Pharmacist  [facility]  Please reach out via Secure Chat for questions, or if no response call [facility] or [facility]era [facility]

## 2024-11-21 NOTE — PROGRESS NOTES
Subjective   Patient ID: Lorraine Liu is a 54 y.o. female presenting for post-operative visit.    HPI Lorraine Liu is a 54 y.o. female with a past medical history of meningioma and R breast cancer s/p mastectomy with immediate reconstruction via tissue expander placement on 3/19/2021. She expressed interest in autologous second stage breast reconstruction and is now s/p R breast reconstruction via EMI free flap on 12/12/23 with Dr. Watkins of Plastic Surgery. She is now s/p right breast revision of reconstruction, reduction of left breast, and fat harvest with graft to right breast 11/19/24.     Patient is doing good post operatively, has some soreness where the fat was harvested.     Review of Systems  ROS: All 10 systems were reviewed and are unremarkable except for those mentioned in HPI.     Objective   Physical Exam  Vitals and nursing note reviewed. Exam conducted with a chaperone present.   Constitutional:       General: She is not in acute distress.     Appearance: She is not ill-appearing.   Eyes:      Extraocular Movements: Extraocular movements intact.      Conjunctiva/sclera: Conjunctivae normal.      Pupils: Pupils are equal, round, and reactive to light.   Cardiovascular:      Rate and Rhythm: Normal rate and regular rhythm.      Pulses: Normal pulses.   Pulmonary:      Effort: Pulmonary effort is normal.      Breath sounds: Normal breath sounds.   Abdominal:      Palpations: Abdomen is soft. There is no mass.      Tenderness: There is no abdominal tenderness.      Hernia: No hernia is present.   Musculoskeletal:         General: No swelling or tenderness.      Cervical back: Normal range of motion and neck supple.   Skin:     Capillary Refill: Capillary refill takes less than 2 seconds.      Coloration: Skin is not jaundiced.      Findings: No bruising or rash.   Neurological:      General: No focal deficit present.      Mental Status: She is oriented to person, place, and time.   Psychiatric:          Mood and Affect: Mood normal.         Behavior: Behavior normal.         Thought Content: Thought content normal.         Judgment: Judgment normal.     Surgical wound at the right chest-flap upper border interface, and the inferior vertical wound are all well healed with normal surrounding skin.   Left breast contour and projection correction is notable. Ptosis is resolved. Residual swelling is noted.   Improved but not resolved asymmetry between the native breast and reconstructed breast.   Assessment/Plan   Patient presents for post operative follow up. She has been doing well post surgery. She experienced minimal bruising, but disclosed numbness at the abdomen and armpits were liposuction was performed. Symmetry has significantly improved, however, the right reconstructed breast still needs additional conning. Residual swelling and edema can still be appreciated in the left breast.     Plan/Recommendations:  - Your incisions are covered in skin glue and tape called steristrips, these will fall off on their own over time.  - Wear soft, non-compressive bras.  - Wear the abdominal binder for comfort.   - No pushing, pulling or lifting objects greater than 5 pounds for 6-8 weeks  - Avoid application of creams, lotions or ointments to surgical site, no soaking or scrubbing of surgical sites.   - Please do not apply ice or heat directly to the skin as feeling to the area may be diminished.   - Please notify our office immediately if developing signs of infection which include increased redness, swelling, fever/chills, green/yellow drainage, or foul odor from wound.   - 3 weeks after surgery you may begin to massage your incisions with body lotion, BioOil, or scar cream.   - Do not use 100% vitamin E as it can cause skin irritation.   - Avoid exposing scars to sun for at least 12 months.   - Always use a strong sunblock if sun exposure is unavoidable (SPF 30 or greater)  -OK for tanning.     F/U: 4 months to  discuss right breast revision.

## 2024-11-27 LAB
LABORATORY COMMENT REPORT: NORMAL
PATH REPORT.FINAL DX SPEC: NORMAL
PATH REPORT.GROSS SPEC: NORMAL
PATH REPORT.RELEVANT HX SPEC: NORMAL
PATH REPORT.TOTAL CANCER: NORMAL

## 2024-11-27 NOTE — OP NOTE
RIGHT BREAST REVISION OF RECONSTRUCTED BREAST (R), REDUCTION LEFT BREAST (L), FAT HARVEST WITH GRAFT TO RIGHT BREAST/EXCISION OF EXTRA SKIN FROM ABDOMEN (R) Operative Note     Date: 2024  OR Location: [facility] OR    Name: Lorraine Liu, : 1970, Age: 54 y.o., MRN: [id number], Sex: female    Diagnosis  Pre-op Diagnosis      * Deformity and disproportion of reconstructed breast [N65.0, N65.1]     * Excessive skin and subcutaneous tissue [L98.7] Post-op Diagnosis     * Deformity and disproportion of reconstructed breast [N65.0, N65.1]     * Excessive skin and subcutaneous tissue [L98.7]     Procedures  RIGHT BREAST REVISION OF RECONSTRUCTED BREAST  78421 - IN REVISION OF RECONSTRUCTED BREAST    REDUCTION LEFT BREAST  14423 - IN MASTOPEXY    FAT HARVEST WITH GRAFT TO RIGHT BREAST/EXCISION OF EXTRA SKIN FROM ABDOMEN  76964 - IN GRAFTING OF AUTOLOGOUS FAT BY LIPO EA ADDL 50 CC    23075    15557 Excision of excess kin 2 cm in diameter and intermediate repair 98552.   Surgeons      * Leigh Watkins - Primary    Resident/Fellow/Other Assistant:  Surgeons and Role:     * Kiersten Boucher MD - Resident - Assisting    Staff:   Circulator: Alexander Dorado Person: Kain Holtub Person: Junior Hale Scrub: Amy Hale Circulator: Amy    Anesthesia Staff: Anesthesiologist: Vick Ascencio MD  C-AA: NATHALIA Nicole; NATHALIA Breen    Procedure Summary  Anesthesia: General  ASA: III  Estimated Blood Loss: 20mL  Intra-op Medications:   Administrations occurring from 0700 to 1300 on 24:   Medication Name Total Dose   ceFAZolin (Ancef) vial 1 g 4 g   dexAMETHasone (Decadron) injection 4 mg/mL 10 mg   fentaNYL (Sublimaze) injection 50 mcg/mL 100 mcg   HYDROmorphone (Dilaudid) injection 1 mg/mL 1 mg   LR bolus Cannot be calculated   lidocaine (Xylocaine) injection 2 % 100 mg   magnesium sulfate 50 % injection 2 g   midazolam PF (Versed) injection 1 mg/mL 2 mg   ondansetron (Zofran) 2 mg/mL injection  4 mg   phenylephrine (Wilfredo-Synephrine) 10 mg in sodium chloride 0.9% 250 mL (0.04 mg/mL) infusion (premix) 0.21 mg   phenylephrine 40 mcg/mL syringe 10 mL 280 mcg   propofol (Diprivan) injection 10 mg/mL 400 mg   rocuronium (ZeMuron) 50 mg/5 mL injection 170 mg   sugammadex (Bridion) 200 mg/2 mL injection 200 mg              Anesthesia Record               Intraprocedure I/O Totals          Intake    LR bolus 1400.00 mL    Phenylephrine Drip 0.00 mL    The total shown is the total volume documented since Anesthesia Start was filed.    Total Intake 1400 mL       Output    Urine 230 mL    Est. Blood Loss 35 mL    Total Output 265 mL       Net    Net Volume 1135 mL          Specimen:   ID Type Source Tests Collected by Time   1 : LEFT BREAST TISSUE Tissue BREAST REDUCTION MAMMOPLASTY LEFT SURGICAL PATHOLOGY EXAM Leigh Watkins MD 11/19/2024 1023                 Drains and/or Catheters:   [REMOVED] Urethral Catheter 16 Fr. (Removed)       Tourniquet Times: N/A        Implants: None    Findings: 120 ml of fat graft was transferred to right breast.    Indications: Lorraine Liu is an 54 y.o. female who is having surgery for Deformity and disproportion of reconstructed breast [N65.0, N65.1]  Excessive skin and subcutaneous tissue [L98.7]. Patient presents today for revision after right EMI flap surgery and balancing native and reconstructed breast. She is indicated fro flap revision, left breast mastopexy with small reduction, fat grafting to the right reconstructed breast, and excision of excess skin at the left edge of the inferior transverse wound after EMI flap harvest.     The patient was seen in the preoperative area. The risks, benefits, complications, treatment options, non-operative alternatives, expected recovery and outcomes were discussed with the patient. The possibilities of reaction to medication, pulmonary aspiration, injury to surrounding structures, bleeding, recurrent infection, the need for additional  procedures, failure to diagnose a condition, and creating a complication requiring transfusion or operation were discussed with the patient. The patient concurred with the proposed plan, giving informed consent.  The site of surgery was properly noted/marked if necessary per policy. The patient has been actively warmed in preoperative area. Preoperative antibiotics have been ordered and given within 1 hours of incision. Venous thrombosis prophylaxis have been ordered including bilateral sequential compression devices    Procedure Details:     Standard safety huddle was performed. Patient was identified by three identifiers.     I proceeded with left breast lift and small reduction. Manzanares pattern design was marked preoperatively in upright stand position. Breast reduction was also planned at the lateral and medial inferior pole while maintaining blood supply to nipple areola intercentrally. I proceeded with de-epithelialization of the inferior pedicle after circumference reduction of the areola to 42 mm Jackson. Next, the breast flaps were elevated to the level of the superficial fascia according to Manzanares design, and then the excess breast tissue at the inferior pole was excised down to the level of chest wall fascia. After that the breast skin flaps were redraped and repaired in inverted T using 2/0 PDS for the repair of the superficial fascial layer, and 3/0 Monocryl for deep dermal repair and followed by 4/0 srtratafix . Next, the location of the nipple areola was marked, and the skin was excised and the nipple areola was delivered and repaired to surrounding breast tissue using double armed 4/0 stratafix.     Next, I shifted my attention to the right reconstructed breast. Excess flap tissue at the superior pole was marked and excised with 15 blade scalpel and electrocautery, after that, an inferior central wedge was marked on the flap and excised to cone the breast flap furthermore. The wounds were then closed in  layers using 2/0 PDS for superficial fascial layer repair, 3/0 Monocryl for deep dermal repair, and 4/0 stratafix for subcuticular repair.    Next, I proceeded with fat grafting. Tumescent solution was infiltrated into the areas marked for fat graft harvest (lateral chest and flanks and abdomen). Infiltration was achieved size 14 cannula introduced via short stab incision placed in  the inferior transverse abdominal scar. Next, 4 mm cannula was introduced to proceed with fat graft, cannula was attached to power-assisted liposuction machine. A total of 1800 ml were infiltrated (solution preparation: 1 L NS +20 ml of lidocaine 1% + 1 ml epinephrine 1:1000. Patient received 380 mg of lidocaine topically), and 1700 ml were aspirated. The aspirated fat was processed via [employer] system, and 170 ml were prepared for infiltration into the targeted areas of the right reconstructed breast. Infiltration and injection wounds wee closed in two layers with 4/0 Monocryl for deep dermal repair and 5/0 Monocryl for skin closure in simple interrupted fashion.     Finally, I shifted my attention to the area of excess skin at the tail of the left side of the inferior transverse incision after EMI flap harvest. The excess skin was marked and measured 6 cm x 2 cm. It was excised in elliptical fashion with 15 blade scalpel and electrocautery down to the level of campers's fascia and then closed in layers with 3/0 Monocryl for the repair of the deep dermal layer and 4/0 Monocryl for subcuticular repair.     Wounds were dressed with dermabond. Patient was put in surgical bra and abdominal binder was applied to the abdomen.     Complications:  None; patient tolerated the procedure well.      Disposition: PACU - hemodynamically stable.    Condition: Stable    Post OP orders: patient can be admitted over night for pain management. Multimodal pain control, encourage early mobilization, avoid manipulating areas of fat grafting, and also avoid  pressuring or compressing the areas of fat grafting. Plan a return visit after 2 weeks.     Attending Attestation: I performed the procedure.    Leigh Watkins  Phone Number: [phone]

## 2024-12-02 ENCOUNTER — APPOINTMENT (OUTPATIENT)
Dept: PLASTIC SURGERY | Facility: CLINIC | Age: 54
End: 2024-12-02
Payer: COMMERCIAL

## 2024-12-02 VITALS
BODY MASS INDEX: 32.65 KG/M2 | WEIGHT: 196 LBS | HEART RATE: 85 BPM | SYSTOLIC BLOOD PRESSURE: 140 MMHG | RESPIRATION RATE: 16 BRPM | HEIGHT: 65 IN | DIASTOLIC BLOOD PRESSURE: 84 MMHG

## 2024-12-02 DIAGNOSIS — Z48.89 ENCOUNTER FOR POSTOPERATIVE WOUND CARE: Primary | ICD-10-CM

## 2024-12-02 PROCEDURE — 3079F DIAST BP 80-89 MM HG: CPT

## 2024-12-02 PROCEDURE — 3077F SYST BP >= 140 MM HG: CPT

## 2024-12-02 PROCEDURE — 3008F BODY MASS INDEX DOCD: CPT

## 2024-12-02 PROCEDURE — 99024 POSTOP FOLLOW-UP VISIT: CPT

## 2024-12-02 ASSESSMENT — PAIN SCALES - GENERAL: PAINLEVEL_OUTOF10: 0-NO PAIN

## 2024-12-05 DIAGNOSIS — Z48.89 ENCOUNTER FOR POSTOPERATIVE WOUND CARE: Primary | ICD-10-CM

## 2024-12-05 RX ORDER — SILVER/HYDROCOLLOID DRESSING 1.2-3.5X4"
1 BANDAGE TOPICAL DAILY
Qty: 30 EACH | Refills: 0 | Status: SHIPPED | OUTPATIENT
Start: 2024-12-05

## 2024-12-05 RX ORDER — BACITRACIN 500 [USP'U]/G
OINTMENT TOPICAL
Qty: 14 G | Refills: 0 | Status: SHIPPED | OUTPATIENT
Start: 2024-12-05 | End: 2025-12-05

## 2024-12-05 RX ORDER — CEPHALEXIN 500 MG/1
500 CAPSULE ORAL 3 TIMES DAILY
Qty: 21 CAPSULE | Refills: 0 | Status: SHIPPED | OUTPATIENT
Start: 2024-12-05 | End: 2024-12-12

## 2024-12-12 ENCOUNTER — TELEPHONE (OUTPATIENT)
Dept: PLASTIC SURGERY | Facility: CLINIC | Age: 54
End: 2024-12-12
Payer: COMMERCIAL

## 2024-12-12 DIAGNOSIS — L53.9 REDNESS: ICD-10-CM

## 2024-12-16 RX ORDER — HYDROCORTISONE 1 %
CREAM (GRAM) TOPICAL 2 TIMES DAILY
Qty: 30 G | Refills: 0 | Status: SHIPPED | OUTPATIENT
Start: 2024-12-16 | End: 2024-12-23

## 2025-02-04 ENCOUNTER — APPOINTMENT (OUTPATIENT)
Dept: HEMATOLOGY/ONCOLOGY | Facility: CLINIC | Age: 55
End: 2025-02-04
Payer: COMMERCIAL

## 2025-02-05 NOTE — PROGRESS NOTES
Patient ID: Lorraine Liu is a 54 y.o. female.    Cancer History:  1. Melanoma on the left calf 0.7 mm superficial spreading melanoma diagnosed in 1997 in the setting of a mole that had changed. Stage T1N0M0 with negative sentinel lymph node and no ulceration or lesion.  2. Risk factors are genetic predisposition of multiple nevi. Brother with an oligodendroglioma. No other malignancy in the family.  3. Status post hysterectomy transvaginally without oophorectomy on 07/20/2020 for heavy menses.  4. 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 ER/WV positive. HER-2/bienvenido pending.  5. 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 ER/WV negative and HER-2/bienvenido positive.  6. Status post lumpectomy on 02/10/2021 for the diagnosis of ER/WV negative, HER-2/bienvenido positive, microinvasive DCIS and multi-focal LCIS as well.  7. Status post right simple mastectomy on 03/19/2021, which confirmed microinvasive DCIS and multi-focal LCIS with none greater than 0.1 cm.  8. Started tamoxifen on 05/05/2021.    Interval History:  Ms. Liu presents today for follow-up, treatment and surveillance. She is complaint on tamoxifen. Her  is with her.     She denies any new breast cancer concerns. She underwent revision surgery to the right breast and had a mastopexy to the left in Nov 2024, recovering well.     She denies any chest pain or breathing issues.     She denies any vision changes or headache issues, dizziness, loss of balance or falls.     She denies any new or unexplained bone aches or pains.    She denies any skin lesions or masses.    She reports a normal appetite.    She denies any issues with sleep, fatigue, hot flashes or mood swings.     Has been a year since her last blood work, over due to see her PCP.     Review of Systems:  ROS 14 points performed, See HPI for exceptions    Allergies:  NKDA    Medications:  Medication list reviewed  with patient and updated in EMR    Social History:  Never smoker    Vital Signs:  /83   Pulse 73   Temp 35.4 °C (95.7 °F) (Temporal)   Resp 17   Wt 87.9 kg (193 lb 12.6 oz)   LMP  (LMP Unknown) Comment: urine pregnancy negative  SpO2 98%   BMI 32.25 kg/m²     Performance Status:  The ECOG performance scale today is ECO- Fully active, able to carry on all pre-disease performance w/o restriction.     Physical Exam  Vitals reviewed.   Constitutional:       General: She is awake. She is not in acute distress.     Appearance: Normal appearance. She is not toxic-appearing.   HENT:      Head: Normocephalic and atraumatic.      Mouth/Throat:      Mouth: Mucous membranes are moist.      Pharynx: Oropharynx is clear.   Eyes:      Conjunctiva/sclera: Conjunctivae normal.      Pupils: Pupils are equal, round, and reactive to light.   Neck:      Trachea: Trachea and phonation normal. No tracheal tenderness.   Cardiovascular:      Rate and Rhythm: Normal rate and regular rhythm.      Pulses: Normal pulses.      Heart sounds: Normal heart sounds. No murmur heard.  Pulmonary:      Effort: Pulmonary effort is normal. No respiratory distress.      Breath sounds: Normal breath sounds.   Chest:      Chest wall: No mass or deformity.   Breasts:     Right: Absent.      Left: No swelling, mass, nipple discharge, skin change or tenderness.      Comments: S/p right mastectomy with EMI reconstruction. S/p left mastopexy  Abdominal:      General: Bowel sounds are normal. There is no distension.      Palpations: Abdomen is soft. There is no mass.      Tenderness: There is no abdominal tenderness. There is no guarding.   Musculoskeletal:         General: Normal range of motion.      Cervical back: Normal range of motion.   Lymphadenopathy:      Upper Body:      Right upper body: No supraclavicular, axillary or pectoral adenopathy.      Left upper body: No supraclavicular, axillary or pectoral adenopathy.   Skin:     General:  Skin is warm and dry.      Capillary Refill: Capillary refill takes less than 2 seconds.      Findings: No rash.   Neurological:      General: No focal deficit present.      Mental Status: She is alert and oriented to person, place, and time.      Motor: No weakness.   Psychiatric:         Mood and Affect: Mood normal.         Behavior: Behavior normal.         Thought Content: Thought content normal.         Judgment: Judgment normal.          BI US breast limited left, BI mammo left diagnostic tomosynthesis  Narrative: Interpreted By:  Matt Mckeon,   STUDY:  BI MAMMO LEFT DIAGNOSTIC TOMOSYNTHESIS; BI US BREAST LIMITED LEFT;  8/19/2024 8:25 am; 8/19/2024 9:23 am      ACCESSION NUMBER(S):  [id number]; [id number]      ORDERING CLINICIAN:  NAHOMI GILL      INDICATION:  Diagnostic evaluation of pain in the inferior left breast. History of  right mastectomy and left excision of benign cyst.      COMPARISON:  01/29/2024 and all relevant prior breast imaging exams available at  the time of dictation.      FINDINGS:  MAMMOGRAPHY: 2D and tomosynthesis images were reviewed at 1 mm slice  thickness.      Density:  The breast tissue is extremely dense, which may limit the  sensitivity of mammography.      A biopsy tissue marker is noted in central lateral breast at  posterior depth. There are benign calcifications and multiple  circumscribed masses fluctuating in size and number consistent with a  changing cystic pattern. A focal asymmetry was initially questioned  in central breast at posterior depth which resolved on additional  mammographic views consistent with superimposition of benign  fibroglandular tissue. No suspicious masses or calcifications are  identified in the left breast.      ULTRASOUND: Targeted ultrasound with elastography was performed by a  registered sonographer in the entire inferior and central left  breast. No suspicious sonographic abnormalities are identified.  Benign cyst are identified  at 12 o'clock, 7 cm from the nipple  measuring 1.1 x 0.8 x 0.9 cm and at 6 o'clock, 6 cm from the nipple  measuring 1.9 x 0.8 x 1.2 cm. The cysts are soft on elastography.      Impression: Benign cysts with no suspicious mammographic or sonographic finding  in the left breast. Clinical follow-up is recommended for breast pain.      BI-RADS CATEGORY:  BI-RADS Category:  2 Benign.  Recommendation:  Clinical Follow-up and Continued Annual Screening.  Recommended Date:  Immediate.  Laterality:  Left.      For any future breast imaging appointments, please call [phone]).          MACRO:  None      Signed by: Matt Mckeon 8/19/2024 9:39 AM  Dictation workstation:   [id number]       BI US breast limited left 08/19/2024  BI mammo left diagnostic tomosynthesis 08/19/2024    Narrative  Interpreted By:  Matt Mckeon,  STUDY:  BI MAMMO LEFT DIAGNOSTIC TOMOSYNTHESIS; BI US BREAST LIMITED LEFT;  8/19/2024 8:25 am; 8/19/2024 9:23 am    ACCESSION NUMBER(S):  [id number]; [id number]    ORDERING CLINICIAN:  NAHOMI GILL    INDICATION:  Diagnostic evaluation of pain in the inferior left breast. History of  right mastectomy and left excision of benign cyst.    COMPARISON:  01/29/2024 and all relevant prior breast imaging exams available at  the time of dictation.    FINDINGS:  MAMMOGRAPHY: 2D and tomosynthesis images were reviewed at 1 mm slice  thickness.    Density:  The breast tissue is extremely dense, which may limit the  sensitivity of mammography.    A biopsy tissue marker is noted in central lateral breast at  posterior depth. There are benign calcifications and multiple  circumscribed masses fluctuating in size and number consistent with a  changing cystic pattern. A focal asymmetry was initially questioned  in central breast at posterior depth which resolved on additional  mammographic views consistent with superimposition of benign  fibroglandular tissue. No suspicious masses or calcifications  are  identified in the left breast.    ULTRASOUND: Targeted ultrasound with elastography was performed by a  registered sonographer in the entire inferior and central left  breast. No suspicious sonographic abnormalities are identified.  Benign cyst are identified at 12 o'clock, 7 cm from the nipple  measuring 1.1 x 0.8 x 0.9 cm and at 6 o'clock, 6 cm from the nipple  measuring 1.9 x 0.8 x 1.2 cm. The cysts are soft on elastography.    Impression  Benign cysts with no suspicious mammographic or sonographic finding  in the left breast. Clinical follow-up is recommended for breast pain.    BI-RADS CATEGORY:  BI-RADS Category:  2 Benign.  Recommendation:  Clinical Follow-up and Continued Annual Screening.  Recommended Date:  Immediate.  Laterality:  Left.    For any future breast imaging appointments, please call [phone]).      MACRO:  None    Signed by: Matt Mckeon 8/19/2024 9:39 AM  Dictation workstation:   [id number]      Assessment:  Breast Cancer:  - Continue Tamoxifen, tolerating well - to be completed May 2026   - s/p left mastopexy in 11/2024 with Dr. Watkins   - Due for a yearly visit - order CBC and CMP for today     Malignant Melanoma:  - Follows with dermatology    Meningioma:   - Follows with neurology, last seen February 2024    Plan:  - RTC in 6 months with Amy Foy PA-C   - Encouraged to call with any questions, concerns or treatment intolerance prior to next office visit     Amy Foy PA-C

## 2025-02-06 ENCOUNTER — OFFICE VISIT (OUTPATIENT)
Dept: HEMATOLOGY/ONCOLOGY | Facility: CLINIC | Age: 55
End: 2025-02-06
Payer: COMMERCIAL

## 2025-02-06 VITALS
SYSTOLIC BLOOD PRESSURE: 133 MMHG | BODY MASS INDEX: 32.25 KG/M2 | RESPIRATION RATE: 17 BRPM | WEIGHT: 193.78 LBS | HEART RATE: 73 BPM | OXYGEN SATURATION: 98 % | DIASTOLIC BLOOD PRESSURE: 83 MMHG | TEMPERATURE: 95.7 F

## 2025-02-06 DIAGNOSIS — Z12.31 SCREENING MAMMOGRAM FOR BREAST CANCER: Primary | ICD-10-CM

## 2025-02-06 DIAGNOSIS — N65.0 DEFORMITY AND DISPROPORTION OF RECONSTRUCTED BREAST: ICD-10-CM

## 2025-02-06 DIAGNOSIS — N65.1 DEFORMITY AND DISPROPORTION OF RECONSTRUCTED BREAST: ICD-10-CM

## 2025-02-06 DIAGNOSIS — C50.411 MALIGNANT NEOPLASM OF UPPER-OUTER QUADRANT OF RIGHT BREAST IN FEMALE, ESTROGEN RECEPTOR NEGATIVE: ICD-10-CM

## 2025-02-06 DIAGNOSIS — L98.7 EXCESSIVE SKIN AND SUBCUTANEOUS TISSUE: ICD-10-CM

## 2025-02-06 DIAGNOSIS — Z17.1 MALIGNANT NEOPLASM OF UPPER-OUTER QUADRANT OF RIGHT BREAST IN FEMALE, ESTROGEN RECEPTOR NEGATIVE: ICD-10-CM

## 2025-02-06 LAB
ABO GROUP (TYPE) IN BLOOD: NORMAL
ALBUMIN SERPL BCP-MCNC: 3.9 G/DL (ref 3.4–5)
ALP SERPL-CCNC: 76 U/L (ref 33–110)
ALT SERPL W P-5'-P-CCNC: 48 U/L (ref 7–45)
ANION GAP SERPL CALC-SCNC: 15 MMOL/L (ref 10–20)
ANTIBODY SCREEN: NORMAL
AST SERPL W P-5'-P-CCNC: 76 U/L (ref 9–39)
BASOPHILS # BLD AUTO: 0.06 X10*3/UL (ref 0–0.1)
BASOPHILS NFR BLD AUTO: 0.6 %
BILIRUB SERPL-MCNC: 0.5 MG/DL (ref 0–1.2)
BUN SERPL-MCNC: 11 MG/DL (ref 6–23)
CALCIUM SERPL-MCNC: 8.9 MG/DL (ref 8.6–10.3)
CHLORIDE SERPL-SCNC: 100 MMOL/L (ref 98–107)
CO2 SERPL-SCNC: 22 MMOL/L (ref 21–32)
CREAT SERPL-MCNC: 0.88 MG/DL (ref 0.5–1.05)
EGFRCR SERPLBLD CKD-EPI 2021: 78 ML/MIN/1.73M*2
EOSINOPHIL # BLD AUTO: 0.16 X10*3/UL (ref 0–0.7)
EOSINOPHIL NFR BLD AUTO: 1.7 %
ERYTHROCYTE [DISTWIDTH] IN BLOOD BY AUTOMATED COUNT: 11.9 % (ref 11.5–14.5)
GLUCOSE SERPL-MCNC: 279 MG/DL (ref 74–99)
HCT VFR BLD AUTO: 35.6 % (ref 36–46)
HGB BLD-MCNC: 12.1 G/DL (ref 12–16)
IMM GRANULOCYTES # BLD AUTO: 0.04 X10*3/UL (ref 0–0.7)
IMM GRANULOCYTES NFR BLD AUTO: 0.4 % (ref 0–0.9)
LYMPHOCYTES # BLD AUTO: 2.7 X10*3/UL (ref 1.2–4.8)
LYMPHOCYTES NFR BLD AUTO: 28 %
MCH RBC QN AUTO: 30.8 PG (ref 26–34)
MCHC RBC AUTO-ENTMCNC: 34 G/DL (ref 32–36)
MCV RBC AUTO: 91 FL (ref 80–100)
MONOCYTES # BLD AUTO: 0.43 X10*3/UL (ref 0.1–1)
MONOCYTES NFR BLD AUTO: 4.5 %
NEUTROPHILS # BLD AUTO: 6.24 X10*3/UL (ref 1.2–7.7)
NEUTROPHILS NFR BLD AUTO: 64.8 %
NRBC BLD-RTO: 0 /100 WBCS (ref 0–0)
PLATELET # BLD AUTO: 204 X10*3/UL (ref 150–450)
POTASSIUM SERPL-SCNC: 4.1 MMOL/L (ref 3.5–5.3)
PROT SERPL-MCNC: 7.4 G/DL (ref 6.4–8.2)
RBC # BLD AUTO: 3.93 X10*6/UL (ref 4–5.2)
RH FACTOR (ANTIGEN D): NORMAL
SODIUM SERPL-SCNC: 133 MMOL/L (ref 136–145)
WBC # BLD AUTO: 9.6 X10*3/UL (ref 4.4–11.3)

## 2025-02-06 PROCEDURE — 99214 OFFICE O/P EST MOD 30 MIN: CPT

## 2025-02-06 PROCEDURE — 85025 COMPLETE CBC W/AUTO DIFF WBC: CPT

## 2025-02-06 PROCEDURE — 80053 COMPREHEN METABOLIC PANEL: CPT

## 2025-02-06 PROCEDURE — 3079F DIAST BP 80-89 MM HG: CPT

## 2025-02-06 PROCEDURE — 3075F SYST BP GE 130 - 139MM HG: CPT

## 2025-02-06 PROCEDURE — 86901 BLOOD TYPING SEROLOGIC RH(D): CPT

## 2025-02-06 ASSESSMENT — PAIN SCALES - GENERAL: PAINLEVEL_OUTOF10: 0-NO PAIN

## 2025-02-06 NOTE — PROGRESS NOTES
Patient here with her  for follow up with Amy URIBE. She is accompanied by her . Medications and allergies reviewed with patient.     Patient states that she continues to have numbness in her abdomen following surgery. Patient compliant on Tamoxifen, she denies joint pain or hot flashes, denies pain, nausea, vomiting, diarrhea, constipation, difficulty eating or weight loss.    Labs obtained today.   Per orders she is to follow up in 1 year, mammogram due in August 2025.     Patient verbalized understanding using the teach back method, no further questions at this time.

## 2025-02-17 ENCOUNTER — OFFICE VISIT (OUTPATIENT)
Dept: PRIMARY CARE | Facility: CLINIC | Age: 55
End: 2025-02-17
Payer: COMMERCIAL

## 2025-02-17 ENCOUNTER — CLINICAL SUPPORT (OUTPATIENT)
Dept: PRIMARY CARE | Facility: CLINIC | Age: 55
End: 2025-02-17
Payer: COMMERCIAL

## 2025-02-17 VITALS
DIASTOLIC BLOOD PRESSURE: 62 MMHG | TEMPERATURE: 96.8 F | RESPIRATION RATE: 18 BRPM | SYSTOLIC BLOOD PRESSURE: 122 MMHG | WEIGHT: 195.4 LBS | BODY MASS INDEX: 32.55 KG/M2 | HEIGHT: 65 IN | HEART RATE: 83 BPM | OXYGEN SATURATION: 99 %

## 2025-02-17 DIAGNOSIS — R73.09 ELEVATED GLUCOSE: ICD-10-CM

## 2025-02-17 DIAGNOSIS — E11.9 TYPE 2 DIABETES MELLITUS WITHOUT COMPLICATION, UNSPECIFIED WHETHER LONG TERM INSULIN USE (MULTI): ICD-10-CM

## 2025-02-17 DIAGNOSIS — E78.2 MIXED HYPERLIPIDEMIA: ICD-10-CM

## 2025-02-17 DIAGNOSIS — Z00.00 ANNUAL PHYSICAL EXAM: Primary | ICD-10-CM

## 2025-02-17 DIAGNOSIS — I10 PRIMARY HYPERTENSION: ICD-10-CM

## 2025-02-17 PROBLEM — Z85.3 HISTORY OF MALIGNANT NEOPLASM OF BREAST: Status: ACTIVE | Noted: 2025-02-17

## 2025-02-17 LAB — POC HEMOGLOBIN A1C: 10 % (ref 4.2–6.5)

## 2025-02-17 PROCEDURE — 1036F TOBACCO NON-USER: CPT | Performed by: FAMILY MEDICINE

## 2025-02-17 PROCEDURE — RXMED WILLOW AMBULATORY MEDICATION CHARGE

## 2025-02-17 PROCEDURE — 83036 HEMOGLOBIN GLYCOSYLATED A1C: CPT | Performed by: FAMILY MEDICINE

## 2025-02-17 PROCEDURE — 3074F SYST BP LT 130 MM HG: CPT | Performed by: FAMILY MEDICINE

## 2025-02-17 PROCEDURE — 3008F BODY MASS INDEX DOCD: CPT | Performed by: FAMILY MEDICINE

## 2025-02-17 PROCEDURE — 99396 PREV VISIT EST AGE 40-64: CPT | Performed by: FAMILY MEDICINE

## 2025-02-17 PROCEDURE — 3078F DIAST BP <80 MM HG: CPT | Performed by: FAMILY MEDICINE

## 2025-02-17 RX ORDER — TIRZEPATIDE 2.5 MG/.5ML
2.5 INJECTION, SOLUTION SUBCUTANEOUS
Qty: 2 ML | Refills: 0 | Status: SHIPPED | OUTPATIENT
Start: 2025-02-17

## 2025-02-17 RX ORDER — TAMOXIFEN CITRATE 10 MG/1
TABLET ORAL DAILY
COMMUNITY

## 2025-02-17 RX ORDER — TIRZEPATIDE 5 MG/.5ML
5 INJECTION, SOLUTION SUBCUTANEOUS
Qty: 2 ML | Refills: 2 | Status: SHIPPED | OUTPATIENT
Start: 2025-02-17

## 2025-02-17 ASSESSMENT — PAIN SCALES - GENERAL: PAINLEVEL_OUTOF10: 0-NO PAIN

## 2025-02-17 NOTE — PROGRESS NOTES
Patient received the following medication education on Mounjaro:    - Counseled patient on MOA, expectations, side effects, duration of therapy, contraindications, administration, and monitoring parameters.  - Provided detailed dosing and administration counseling to ensure proper technique.   - Reviewed Mounjaro titration schedule, starting with 2.5 mg once weekly to 5mg starting on the 5th week. Patient verbalized understanding  - Counseled patient on the benefits of GLP-1ra glycemic control and weight loss  - Reviewed storage requirements of Mounjaro when not in use, and when to administer the medication if a dose is missed.  - Advised patient that they may experience improved satiety after meals and portion sizes of meals may be reduced as doses of Mounjaro increase.    Medication education provided by SHANNAN Velasco, PharmD, BCPS

## 2025-02-17 NOTE — PROGRESS NOTES
"Subjective   Patient ID: Lorraine Liu is a 54 y.o. female who presents for Annual Exam.    HPI she is fasting     Review of Systems    Objective   /62   Pulse 83   Temp 36 °C (96.8 °F)   Resp 18   Ht 1.651 m (5' 5\")   Wt 88.6 kg (195 lb 6.4 oz)   LMP  (LMP Unknown) Comment: urine pregnancy negative  SpO2 99%   BMI 32.52 kg/m²     Physical Exam  Vitals and nursing note reviewed.   Constitutional:       General: She is not in acute distress.  HENT:      Right Ear: Tympanic membrane and ear canal normal.      Left Ear: Tympanic membrane and ear canal normal.      Nose: Nose normal. No rhinorrhea.      Mouth/Throat:      Pharynx: Oropharynx is clear. No oropharyngeal exudate or posterior oropharyngeal erythema.      Comments: Dentition wnl  Eyes:      Extraocular Movements: Extraocular movements intact.      Conjunctiva/sclera: Conjunctivae normal.      Pupils: Pupils are equal, round, and reactive to light.   Neck:      Vascular: No carotid bruit.   Cardiovascular:      Rate and Rhythm: Normal rate and regular rhythm.      Heart sounds: Normal heart sounds. No murmur heard.  Pulmonary:      Breath sounds: Normal breath sounds. No wheezing or rhonchi.   Abdominal:      General: Bowel sounds are normal. There is no distension.      Palpations: Abdomen is soft. There is no mass.      Tenderness: There is no abdominal tenderness. There is no guarding or rebound.      Hernia: No hernia is present.   Musculoskeletal:         General: No swelling or tenderness. Normal range of motion.      Cervical back: Normal range of motion and neck supple.   Lymphadenopathy:      Cervical: No cervical adenopathy.   Skin:     General: Skin is warm.      Findings: No rash.   Neurological:      General: No focal deficit present.      Mental Status: She is alert.         Assessment/Plan   Problem List Items Addressed This Visit             ICD-10-CM    Hypertension I10    Relevant Orders    CBC and Auto Differential    " Comprehensive Metabolic Panel    Lipid Panel    TSH with reflex to Free T4 if abnormal    Hyperlipidemia E78.5    Relevant Orders    CBC and Auto Differential    Comprehensive Metabolic Panel    Lipid Panel    TSH with reflex to Free T4 if abnormal     Other Visit Diagnoses         Codes    Annual physical exam    -  Primary Z00.00    Relevant Orders    CBC and Auto Differential    Comprehensive Metabolic Panel    Lipid Panel    TSH with reflex to Free T4 if abnormal    Elevated glucose     R73.09    Relevant Orders    POCT glycosylated hemoglobin (Hb A1C) manually resulted (Completed)    Type 2 diabetes mellitus without complication, unspecified whether long term insulin use (Multi)     E11.9    Relevant Orders    Comprehensive Metabolic Panel    Referral to Clinical Pharmacy

## 2025-02-18 ENCOUNTER — PATIENT MESSAGE (OUTPATIENT)
Dept: PRIMARY CARE | Facility: CLINIC | Age: 55
End: 2025-02-18
Payer: COMMERCIAL

## 2025-02-18 LAB
ALBUMIN SERPL-MCNC: 4.7 G/DL (ref 3.6–5.1)
ALP SERPL-CCNC: 77 U/L (ref 37–153)
ALT SERPL-CCNC: 56 U/L (ref 6–29)
ANION GAP SERPL CALCULATED.4IONS-SCNC: 12 MMOL/L (CALC) (ref 7–17)
AST SERPL-CCNC: 101 U/L (ref 10–35)
BASOPHILS # BLD AUTO: 84 CELLS/UL (ref 0–200)
BASOPHILS NFR BLD AUTO: 1.1 %
BILIRUB SERPL-MCNC: 0.7 MG/DL (ref 0.2–1.2)
BUN SERPL-MCNC: 13 MG/DL (ref 7–25)
CALCIUM SERPL-MCNC: 9.4 MG/DL (ref 8.6–10.4)
CHLORIDE SERPL-SCNC: 102 MMOL/L (ref 98–110)
CHOLEST SERPL-MCNC: 230 MG/DL
CHOLEST/HDLC SERPL: 4.5 (CALC)
CO2 SERPL-SCNC: 24 MMOL/L (ref 20–32)
CREAT SERPL-MCNC: 0.97 MG/DL (ref 0.5–1.03)
EGFRCR SERPLBLD CKD-EPI 2021: 69 ML/MIN/1.73M2
EOSINOPHIL # BLD AUTO: 122 CELLS/UL (ref 15–500)
EOSINOPHIL NFR BLD AUTO: 1.6 %
ERYTHROCYTE [DISTWIDTH] IN BLOOD BY AUTOMATED COUNT: 11.7 % (ref 11–15)
GLUCOSE SERPL-MCNC: 247 MG/DL (ref 65–99)
HCT VFR BLD AUTO: 40 % (ref 35–45)
HDLC SERPL-MCNC: 51 MG/DL
HGB BLD-MCNC: 13.3 G/DL (ref 11.7–15.5)
LDLC SERPL CALC-MCNC: 150 MG/DL (CALC)
LYMPHOCYTES # BLD AUTO: 2440 CELLS/UL (ref 850–3900)
LYMPHOCYTES NFR BLD AUTO: 32.1 %
MCH RBC QN AUTO: 30 PG (ref 27–33)
MCHC RBC AUTO-ENTMCNC: 33.3 G/DL (ref 32–36)
MCV RBC AUTO: 90.3 FL (ref 80–100)
MONOCYTES # BLD AUTO: 380 CELLS/UL (ref 200–950)
MONOCYTES NFR BLD AUTO: 5 %
NEUTROPHILS # BLD AUTO: 4575 CELLS/UL (ref 1500–7800)
NEUTROPHILS NFR BLD AUTO: 60.2 %
NONHDLC SERPL-MCNC: 179 MG/DL (CALC)
PLATELET # BLD AUTO: 207 THOUSAND/UL (ref 140–400)
PMV BLD REES-ECKER: 12 FL (ref 7.5–12.5)
POTASSIUM SERPL-SCNC: 4.1 MMOL/L (ref 3.5–5.3)
PROT SERPL-MCNC: 7.8 G/DL (ref 6.1–8.1)
RBC # BLD AUTO: 4.43 MILLION/UL (ref 3.8–5.1)
SERVICE CMNT-IMP: NORMAL
SODIUM SERPL-SCNC: 138 MMOL/L (ref 135–146)
TRIGL SERPL-MCNC: 157 MG/DL
TSH SERPL-ACNC: 1.69 MIU/L
WBC # BLD AUTO: 7.6 THOUSAND/UL (ref 3.8–10.8)

## 2025-02-19 ENCOUNTER — PHARMACY VISIT (OUTPATIENT)
Dept: PHARMACY | Facility: CLINIC | Age: 55
End: 2025-02-19
Payer: COMMERCIAL

## 2025-02-20 DIAGNOSIS — E78.2 MIXED HYPERLIPIDEMIA: Primary | ICD-10-CM

## 2025-02-20 RX ORDER — ATORVASTATIN CALCIUM 20 MG/1
20 TABLET, FILM COATED ORAL DAILY
Qty: 100 TABLET | Refills: 3 | Status: SHIPPED | OUTPATIENT
Start: 2025-02-20 | End: 2026-03-27

## 2025-03-13 ENCOUNTER — TELEPHONE (OUTPATIENT)
Dept: HEMATOLOGY/ONCOLOGY | Facility: CLINIC | Age: 55
End: 2025-03-13
Payer: COMMERCIAL

## 2025-03-13 DIAGNOSIS — Z17.1 MALIGNANT NEOPLASM OF UPPER-OUTER QUADRANT OF RIGHT BREAST IN FEMALE, ESTROGEN RECEPTOR NEGATIVE: Primary | ICD-10-CM

## 2025-03-13 DIAGNOSIS — C50.411 MALIGNANT NEOPLASM OF UPPER-OUTER QUADRANT OF RIGHT BREAST IN FEMALE, ESTROGEN RECEPTOR NEGATIVE: Primary | ICD-10-CM

## 2025-03-13 RX ORDER — TAMOXIFEN CITRATE 20 MG/1
20 TABLET ORAL DAILY
Qty: 90 TABLET | Refills: 3 | Status: SHIPPED | OUTPATIENT
Start: 2025-03-13

## 2025-03-13 RX ORDER — TAMOXIFEN CITRATE 20 MG/1
20 TABLET ORAL DAILY
Qty: 90 TABLET | Refills: 3 | Status: SHIPPED | OUTPATIENT
Start: 2025-03-13 | End: 2025-03-13 | Stop reason: SDUPTHER

## 2025-03-26 ENCOUNTER — SPECIALTY PHARMACY (OUTPATIENT)
Dept: PHARMACY | Facility: CLINIC | Age: 55
End: 2025-03-26

## 2025-03-26 PROCEDURE — RXMED WILLOW AMBULATORY MEDICATION CHARGE

## 2025-03-27 ENCOUNTER — PHARMACY VISIT (OUTPATIENT)
Dept: PHARMACY | Facility: CLINIC | Age: 55
End: 2025-03-27
Payer: COMMERCIAL

## 2025-05-23 DIAGNOSIS — I10 HYPERTENSION, UNSPECIFIED TYPE: ICD-10-CM

## 2025-05-24 DIAGNOSIS — I10 HYPERTENSION, UNSPECIFIED TYPE: ICD-10-CM

## 2025-05-24 RX ORDER — LISINOPRIL AND HYDROCHLOROTHIAZIDE 12.5; 2 MG/1; MG/1
2 TABLET ORAL DAILY
Qty: 180 TABLET | Refills: 3 | Status: SHIPPED | OUTPATIENT
Start: 2025-05-24

## 2025-05-27 RX ORDER — LISINOPRIL AND HYDROCHLOROTHIAZIDE 12.5; 2 MG/1; MG/1
2 TABLET ORAL DAILY
Qty: 180 TABLET | Refills: 0 | OUTPATIENT
Start: 2025-05-27

## 2025-06-02 ENCOUNTER — ANCILLARY PROCEDURE (OUTPATIENT)
Dept: URGENT CARE | Age: 55
End: 2025-06-02
Payer: COMMERCIAL

## 2025-06-02 ENCOUNTER — OFFICE VISIT (OUTPATIENT)
Dept: URGENT CARE | Age: 55
End: 2025-06-02
Payer: COMMERCIAL

## 2025-06-02 VITALS
SYSTOLIC BLOOD PRESSURE: 132 MMHG | BODY MASS INDEX: 30.29 KG/M2 | DIASTOLIC BLOOD PRESSURE: 84 MMHG | HEART RATE: 92 BPM | WEIGHT: 182 LBS | RESPIRATION RATE: 17 BRPM | OXYGEN SATURATION: 95 % | TEMPERATURE: 98.1 F

## 2025-06-02 DIAGNOSIS — R52 PAIN: ICD-10-CM

## 2025-06-02 DIAGNOSIS — M77.32 CALCANEAL SPUR OF FOOT, LEFT: Primary | ICD-10-CM

## 2025-06-02 PROCEDURE — 73650 X-RAY EXAM OF HEEL: CPT | Mod: BILATERAL PROCEDURE

## 2025-06-02 RX ORDER — ACETAMINOPHEN 500 MG
1000 TABLET ORAL ONCE
Status: COMPLETED | OUTPATIENT
Start: 2025-06-02 | End: 2025-06-02

## 2025-06-02 RX ADMIN — Medication 1000 MG: at 20:23

## 2025-06-02 NOTE — PROGRESS NOTES
Subjective   Patient ID: Lorraine Liu is a 54 y.o. female. They present today with a chief complaint of Fall Injury (Patient fell off a ladder and injured both of her heels. ).    History of Present Illness    History provided by:  Patient   used: No        Past Medical History  Allergies as of 06/02/2025    (No Known Allergies)       Prescriptions Prior to Admission[1]     Medical History[2]    Surgical History[3]     reports that she has never smoked. She has never been exposed to tobacco smoke. She has never used smokeless tobacco. She reports that she does not drink alcohol and does not use drugs.    Review of Systems  Review of Systems   All other systems reviewed and are negative.                                 Objective    Vitals:    06/02/25 1922   BP: 132/84   BP Location: Left arm   Patient Position: Sitting   BP Cuff Size: Adult   Pulse: 92   Resp: 17   Temp: 36.7 °C (98.1 °F)   TempSrc: Temporal   SpO2: 95%   Weight: 82.6 kg (182 lb)     No LMP recorded (lmp unknown). Patient has had a hysterectomy.    Physical Exam  Vitals and nursing note reviewed.   Constitutional:       General: She is not in acute distress.     Appearance: Normal appearance. She is normal weight. She is not ill-appearing, toxic-appearing or diaphoretic.   HENT:      Head: Normocephalic and atraumatic.      Comments: No Guzman sign, raccoon eyes, evidence of epistaxis     Mouth/Throat:      Mouth: Mucous membranes are moist.   Eyes:      General: No scleral icterus.        Right eye: No discharge.         Left eye: No discharge.      Extraocular Movements: Extraocular movements intact.      Conjunctiva/sclera: Conjunctivae normal.      Pupils: Pupils are equal, round, and reactive to light.   Cardiovascular:      Rate and Rhythm: Normal rate and regular rhythm.   Pulmonary:      Effort: Pulmonary effort is normal.   Abdominal:      General: Abdomen is flat.   Musculoskeletal:         General: Tenderness and  signs of injury present. No deformity. Normal range of motion.      Cervical back: Normal range of motion and neck supple. No rigidity or tenderness.      Comments: Tenderness to palpation of bilateral heels.  No open wound, ecchymosis, erythema.  Sensation intact all nerve distributions of both feet.  No palpable bony deformity or step-off.  No tenderness to palpation of remainder of feet, ankles, tib-fib, knee.  Range of motion completely intact to all joints of lower extremities.  Neurovascular intact with soft compartments.    No C, T, LS spinal tenderness step-off or deformity.  Range of motion of all extremities completely intact.  Palpation of extremities, chest, abdomen, pelvis nontender.   Lymphadenopathy:      Cervical: No cervical adenopathy.   Skin:     General: Skin is warm and dry.      Capillary Refill: Capillary refill takes less than 2 seconds.      Coloration: Skin is not jaundiced or pale.      Findings: No rash.   Neurological:      General: No focal deficit present.      Mental Status: She is alert.      Gait: Gait normal.   Psychiatric:         Mood and Affect: Mood normal.         Behavior: Behavior normal.         Procedures    Point of Care Test & Imaging Results from this visit  No results found for this visit on 06/02/25.   Imaging  No results found.      Cardiology, Vascular, and Other Imaging  No other imaging results found for the past 2 days      Diagnostic study results (if any) were reviewed by Jayne Chaudhary PA-C.    Assessment/Plan   Allergies, medications, history, and pertinent labs/EKGs/Imaging reviewed by Jayne Chaudhary PA-C.     Medical Decision Making  54 year old F presents with complaint of bilateral heel pain.  She states she was cleaning her bar standing on a ladder at least 4 feet in the air.  The ladder gave out and she fell flat on her heels on the ground.  She has severe bilateral heel pain and difficulty putting any weight on her heels.  No paraesthesias or  open wounds.  She did not hit her head or lose consciousness.  She denies any other injury.  She took ibuprofen prior to arrival.  No known drug allergies.  Physical exam as above.  No features of open fracture, neurovascular compromise, compartment syndrome, vertebral fracture, ICH or other emergency.  X-rays of bilateral heels with calcaneal spur fracture on the left.  Interestingly patient has more pain to the right foot.  Discussed results with patient.  She is reluctant to use walking boot or postop shoe on the left foot as she has more pain in the right.  She is provided crutches and orthopedic referral.  Discussed NSAIDs, RICE, acetaminophen.  Avoid weightbearing when possible if painful.  Encouraged follow-up with primary care provider.  Discussed expected course, indications for return or for presentation to emergency department.  Discharged good condition agreeable to plan as discussed.      Orders and Diagnoses  Diagnoses and all orders for this visit:  Calcaneal spur of foot, left  Comments:  fracture of calcaneal spur  Orders:  -     Crutches  -     Referral to Orthopedics and Sports Medicine; Future  Pain  -     XR calcaneus 2 views bilateral; Future  -     acetaminophen (Tylenol) tablet 1,000 mg      Medical Admin Record  Administrations This Visit       acetaminophen (Tylenol) tablet 1,000 mg       Admin Date  06/02/2025 Action  Given Dose  1,000 mg Route  oral Documented By  Kourtney Hancock MA                    Patient disposition: Home    Electronically signed by Jayne Chaudhary PA-C  8:16 PM           [1] (Not in a hospital admission)   [2]   Past Medical History:  Diagnosis Date    Chest pain     Saw Dr. Mclaughlin in 05/2022    Ductal carcinoma of breast 01/2021    Hypertension     F/W PCP    Melanoma (Multi)     Meningioma (Multi)     Shortness of breath     Ct Calcium Scoring 3/18/2022: IMPRESSION: Coronary artery calcium score of  0.    Stress incontinence    [3]   Past Surgical  History:  Procedure Laterality Date    BI MAMMO GUIDED BREAST RIGHT LOCALIZATION Right 02/10/2021    BI MAMMO GUIDED LOCALIZATION BREAST RIGHT 2/10/2021 [facility]    BI STEREOTACTIC GUIDED BREAST RIGHT LOCALIZATION AND BIOPSY Right 2021    BI STEREOTACTIC GUIDED BREAST LOCALIZATION AND BIOPSY RIGHT [facility]    BI US GUIDED BREAST LEFT CYST ASPIRATION Left 2016    BI BREAST CYST ASPIRATION LEFT [facility]    BI US GUIDED BREAST LOCALIZATION AND BIOPSY RIGHT Right 2021    BI US GUIDED BREAST LOCALIZATION AND BIOPSY RIGHT [facility]    BREAST LUMPECTOMY Right 02/10/2021     SECTION, LOW TRANSVERSE      x2    CT ABDOMEN PELVIS ANGIOGRAM W AND/OR WO IV CONTRAST  2023    CT ABDOMEN PELVIS ANGIOGRAM W AND/OR WO IV CONTRAST 2023 [id number] CT    HYSTERECTOMY      d/t heavy menses    MASTECTOMY Right     OTHER SURGICAL HISTORY  2021    Breast reconstruction    OTHER SURGICAL HISTORY  1996    Melanoma Excision

## 2025-06-19 ENCOUNTER — SPECIALTY PHARMACY (OUTPATIENT)
Dept: PHARMACY | Facility: CLINIC | Age: 55
End: 2025-06-19

## 2025-06-19 PROCEDURE — RXMED WILLOW AMBULATORY MEDICATION CHARGE

## 2025-06-20 ENCOUNTER — PHARMACY VISIT (OUTPATIENT)
Dept: PHARMACY | Facility: CLINIC | Age: 55
End: 2025-06-20
Payer: COMMERCIAL

## 2025-08-25 ENCOUNTER — HOSPITAL ENCOUNTER (OUTPATIENT)
Dept: RADIOLOGY | Facility: HOSPITAL | Age: 55
Discharge: HOME | End: 2025-08-25
Payer: COMMERCIAL

## 2025-08-25 VITALS — WEIGHT: 182 LBS | BODY MASS INDEX: 30.32 KG/M2 | HEIGHT: 65 IN

## 2025-08-25 DIAGNOSIS — Z12.31 SCREENING MAMMOGRAM FOR BREAST CANCER: ICD-10-CM

## 2025-08-25 DIAGNOSIS — Z17.1 MALIGNANT NEOPLASM OF UPPER-OUTER QUADRANT OF RIGHT BREAST IN FEMALE, ESTROGEN RECEPTOR NEGATIVE: ICD-10-CM

## 2025-08-25 DIAGNOSIS — C50.411 MALIGNANT NEOPLASM OF UPPER-OUTER QUADRANT OF RIGHT BREAST IN FEMALE, ESTROGEN RECEPTOR NEGATIVE: ICD-10-CM

## 2025-08-25 PROCEDURE — 77067 SCR MAMMO BI INCL CAD: CPT | Mod: LEFT SIDE | Performed by: RADIOLOGY

## 2025-08-25 PROCEDURE — 77067 SCR MAMMO BI INCL CAD: CPT | Mod: 52,LT

## 2025-08-25 PROCEDURE — 77063 BREAST TOMOSYNTHESIS BI: CPT | Mod: LEFT SIDE | Performed by: RADIOLOGY

## (undated) DEVICE — MARKER, SKIN, DUAL TIP INK W/9 LABEL AND REMOVABLE TIME OUT SLEEVE

## (undated) DEVICE — DRAPE, INSTRUMENT, W/POUCH, STERI DRAPE, 7 X 11 IN, DISPOSABLE, STERILE

## (undated) DEVICE — MANIFOLD, 4 PORT NEPTUNE STANDARD

## (undated) DEVICE — BAG, DECANTER

## (undated) DEVICE — EVACUATOR, WOUND, SUCTION, CLOSED, JACKSON-PRATT, 100 CC, SILICONE

## (undated) DEVICE — DRAPE, SMARTDRAPE, FOR TIVATO MICROSCOPE

## (undated) DEVICE — STATLOCK, FOLEY SWIVEL, SILICONE TRICOT

## (undated) DEVICE — DRESSING, TRANSPARENT, TEGADERM, 4 X 4-3/4 IN

## (undated) DEVICE — SPONGE, LAP, XRAY DECT, 18IN X 18IN, W/LOOP, STERILE

## (undated) DEVICE — TUBING SET, ASPIRATION

## (undated) DEVICE — DRESSING, ASSY, PREVENA CUSTOMIZABLE

## (undated) DEVICE — Device

## (undated) DEVICE — SUTURE, MONOCRYL, 3-0, 18 IN, PS2, UNDYED

## (undated) DEVICE — SPONGE, GAUZE, XRAY DECT, 16 PLY, 4 X 4, W/MASTER DMT,STERILE

## (undated) DEVICE — SUTURE, MONOCRYL, 4-0, 18 IN, PS2, UNDYED

## (undated) DEVICE — CATHETER TRAY, SURESTEP, 16FR, URINE METER W/STATLOCK

## (undated) DEVICE — TUBING, INFILTRATION PUMP, K-PUMP, DOUBLE SPIKED

## (undated) DEVICE — STAPLER, SKIN PROXIMATE, 35 WIDE

## (undated) DEVICE — SYRINGE, HYPODERMIC, LUER LOCK, 6 CC

## (undated) DEVICE — TRAY, MINOR, SINGLE BASIN, STERILE

## (undated) DEVICE — TOWEL, SURGICAL, NEURO, O/R, 16 X 26, BLUE, STERILE

## (undated) DEVICE — GEL, ECOVUE, ULTRASOUND, 20GRAM, STERILE

## (undated) DEVICE — APPLICATOR, CHLORAPREP, W/ORANGE TINT, 26ML

## (undated) DEVICE — DRESSING, GAUZE, PETROLATUM, XEROFORM, 5 X 9 IN, STERILE

## (undated) DEVICE — WOUND VAC KIT, W/CANNISTER, 500ML, 5/PK

## (undated) DEVICE — CATHETER, IV, INSYTE, AUTOGUARD, SHIELDED, 24 G X 0.75 IN, VIALON

## (undated) DEVICE — TIP, SUCTION, FRAZIER, W/CONTROL VENT, 12 FR

## (undated) DEVICE — PAD, GROUNDING, ELECTROSURGICAL, W/9 FT CABLE, POLYHESIVE II, ADULT, LF

## (undated) DEVICE — TUBING, SMOKE EVAC, 3/8 X 10 FT

## (undated) DEVICE — COVER, CART, 45 X 27 X 48 IN, CLEAR

## (undated) DEVICE — TUBING, ASPIRATION, LIPOSUCTION

## (undated) DEVICE — SUTURE, PDS II, 2-0, 27 IN, SH, VIOLET

## (undated) DEVICE — PREP TRAY, SKIN, DRY, W/GLOVES

## (undated) DEVICE — MARKER, SKIN, DUAL TIP, W/RULER AND LABEL

## (undated) DEVICE — DRESSING, SILVER IMPREGNATED, AQUACEL AG EXTRA, 4X5

## (undated) DEVICE — BINDER, ABDOMINAL, 4 PANEL, 12 X 46-62 IN

## (undated) DEVICE — DRESSING, ABDOMINAL, TENDERSORB, 8 X 10 IN, STERILE

## (undated) DEVICE — BACKGROUND MATERIAL MICROMAT

## (undated) DEVICE — NEEDLE, MICRODISSECTION STR 4CM

## (undated) DEVICE — APPLIER, LIGACLIP, MULTIPLE, SMALL 9-3/8IN

## (undated) DEVICE — APPLIER,  LIGACLIP MULTI CLIP, 30 MED 11 1/2

## (undated) DEVICE — MICROCLIPS, GEM HEMOSTATIC TITANIUM

## (undated) DEVICE — CLEANER, WIPE, INSTRUMENT, 3.25 X 3.25 IN

## (undated) DEVICE — THERAPY UNIT, PREVENA PLUS 125

## (undated) DEVICE — STAY SET, SURGICAL, 5MM SHARP HOOK, 8PK

## (undated) DEVICE — DRESSING, GAUZE, PETROLATUM, PATCH, XEROFORM, 1 X 8 IN, STERILE

## (undated) DEVICE — SUTURE, PLAIN, 5-0, 18 IN, PC1, YELLOW

## (undated) DEVICE — IRRIGATION SET, Y, LARGE BORE

## (undated) DEVICE — DRAPE, ISOLATION, BAG, DRAW STRING CLOSURE, STERI DRAPE, LARGE, 20 X 20 IN, DISPOSABLE, STERILE